# Patient Record
Sex: MALE | Race: WHITE | Employment: OTHER | URBAN - METROPOLITAN AREA
[De-identification: names, ages, dates, MRNs, and addresses within clinical notes are randomized per-mention and may not be internally consistent; named-entity substitution may affect disease eponyms.]

---

## 2017-01-27 ENCOUNTER — TELEPHONE (OUTPATIENT)
Dept: INTERNAL MEDICINE CLINIC | Facility: CLINIC | Age: 67
End: 2017-01-27

## 2017-01-27 RX ORDER — CODEINE PHOSPHATE AND GUAIFENESIN 10; 100 MG/5ML; MG/5ML
5 SOLUTION ORAL EVERY 6 HOURS PRN
Qty: 240 ML | Refills: 0 | Status: CANCELLED | OUTPATIENT
Start: 2017-01-27

## 2017-01-27 NOTE — TELEPHONE ENCOUNTER
Patient stated that has a dry cough for 3-4 day. Not short of breath or wheezing. No fevers. Patient picked up Coricidin and had alittle Cheratussin AC left from before which does help with the cough.  Patient could not complete a full sentence while speaki

## 2017-01-27 NOTE — TELEPHONE ENCOUNTER
CHRISTYPAULA, ok to trans to [phone]. Advised in message to go to IC in [facility] or [facility],  Also advised office closed now and nurses will be available tomorrow from 8-1.

## 2017-01-27 NOTE — TELEPHONE ENCOUNTER
He needs to be seen before abx are given; we can send him to urgent care since we are done with clinic and I dont have any opening anymore tomorrow

## 2017-01-28 NOTE — TELEPHONE ENCOUNTER
Pt stts he is feeling slightly better. Slept all day yesterday. No fever . Message from Dr. Hayley Ford given. Stts if he gets worse he will go to urgent care or call back on Monday.

## 2017-03-21 RX ORDER — AMLODIPINE BESYLATE 5 MG/1
TABLET ORAL
Qty: 30 TABLET | Refills: 0 | Status: SHIPPED | OUTPATIENT
Start: 2017-03-21 | End: 2017-04-25

## 2017-03-24 NOTE — TELEPHONE ENCOUNTER
Phone call to patient. S/W patient and related Dr. Emerson Brennen message that refill done for 30 day supply only; that patient needs office visit for further refills. Patient states, :\"ok I'll call for an appointment\".

## 2017-04-26 RX ORDER — AMLODIPINE BESYLATE 5 MG/1
TABLET ORAL
Qty: 30 TABLET | Refills: 0 | Status: SHIPPED | OUTPATIENT
Start: 2017-04-26 | End: 2017-05-31

## 2017-05-18 NOTE — TELEPHONE ENCOUNTER
Pt is asking to have a refill on this medication   Pt stts he has 2 pills left   Please advise   Current Outpatient Prescriptions:  Diclofenac Sodium 50 MG Oral Tab EC Take 1 tablet (50 mg total) by mouth 3 (three) times daily as needed.  Disp: 30 tablet Rf

## 2017-05-20 NOTE — TELEPHONE ENCOUNTER
Noted med previously presribed by Dr Gentry Mccormick but an IM RN had previously sent a refill on  11/29/16 for #30 under Dr Shiraz Barfield. Informed pt who agrees Dr Gentry Mccormick was the prescriber but he had gotten a refill from Dr Shiraz Barfield for #30.  Explained to pt what had happened and he st

## 2017-05-23 NOTE — TELEPHONE ENCOUNTER
Refill request for voltaren 50 mg, TID PRN, #30, no refills    LOV: 1/6/16  NOV: 7/11/17  Last refilled on 11/29/16

## 2017-05-31 RX ORDER — AMLODIPINE BESYLATE 5 MG/1
TABLET ORAL
Qty: 30 TABLET | Refills: 0 | Status: SHIPPED | OUTPATIENT
Start: 2017-05-31 | End: 2017-07-07

## 2017-06-01 NOTE — TELEPHONE ENCOUNTER
Left detailed message for patient notifying him of below. Asked him to call the office if he had questions.

## 2017-06-15 ENCOUNTER — TELEPHONE (OUTPATIENT)
Dept: NEUROLOGY | Facility: CLINIC | Age: 67
End: 2017-06-15

## 2017-06-15 NOTE — TELEPHONE ENCOUNTER
Patient has an appointment on 7/11/17. Left detailed message for patient notifying him that he should keep appointment if he wants further refills.

## 2017-07-05 NOTE — TELEPHONE ENCOUNTER
Pharmacy called in a refill request for pt on medication below, please.     Current Outpatient Prescriptions:     •  AMLODIPINE BESYLATE 5 MG Oral Tab, TAKE 1 TABLET BY MOUTH EVERY DAY, Disp: 30 tablet, Rfl: 0

## 2017-07-07 NOTE — TELEPHONE ENCOUNTER
Hypertensive Medications Failed protocol - LOV 7/14/2016, no CMP. Prescription pended.   Protocol Criteria:  · Appointment scheduled in the past 6 months or in the next 3 months  · BMP or CMP in the past 12 months  · Creatinine result < 2  Recent Outpatien

## 2017-07-08 RX ORDER — AMLODIPINE BESYLATE 5 MG/1
5 TABLET ORAL
Qty: 30 TABLET | Refills: 0 | Status: SHIPPED | OUTPATIENT
Start: 2017-07-08 | End: 2017-10-06

## 2017-07-11 ENCOUNTER — OFFICE VISIT (OUTPATIENT)
Dept: NEUROLOGY | Facility: CLINIC | Age: 67
End: 2017-07-11

## 2017-07-11 VITALS
HEIGHT: 64 IN | WEIGHT: 187 LBS | BODY MASS INDEX: 31.92 KG/M2 | OXYGEN SATURATION: 95 % | DIASTOLIC BLOOD PRESSURE: 78 MMHG | HEART RATE: 97 BPM | SYSTOLIC BLOOD PRESSURE: 144 MMHG | RESPIRATION RATE: 20 BRPM

## 2017-07-11 DIAGNOSIS — M48.061 LUMBAR STENOSIS: ICD-10-CM

## 2017-07-11 DIAGNOSIS — M54.16 LUMBAR RADICULOPATHY: ICD-10-CM

## 2017-07-11 DIAGNOSIS — M41.25 OTHER IDIOPATHIC SCOLIOSIS, THORACOLUMBAR REGION: Primary | ICD-10-CM

## 2017-07-11 DIAGNOSIS — M17.11 PRIMARY OSTEOARTHRITIS OF RIGHT KNEE: ICD-10-CM

## 2017-07-11 PROCEDURE — 99214 OFFICE O/P EST MOD 30 MIN: CPT | Performed by: PHYSICAL MEDICINE & REHABILITATION

## 2017-07-11 RX ORDER — DICLOFENAC SODIUM 75 MG/1
75 TABLET, DELAYED RELEASE ORAL 3 TIMES DAILY PRN
Qty: 90 TABLET | Refills: 2 | Status: SHIPPED | OUTPATIENT
Start: 2017-07-11 | End: 2018-05-22

## 2017-07-11 RX ORDER — GABAPENTIN 100 MG/1
100 CAPSULE ORAL 3 TIMES DAILY
Qty: 90 CAPSULE | Refills: 0 | Status: SHIPPED | OUTPATIENT
Start: 2017-07-11 | End: 2018-05-22

## 2017-07-11 NOTE — PATIENT INSTRUCTIONS
- take the diclofenac 75mg three times a day. Anti-inflammatory   - take gabapentin 100mg three times a day. Nerve medicine.  Take first pill at night.  - get mri of your low back - this is going to show the nerves  - call Dr Jo Jacobo in 3 weeks with update on y physicians rotate between multiple offices and procedure days in the hospitals. · Patient must present photo ID at time of .  If a designated family member will be picking up prescription, office must be given name of individual in advance and they

## 2017-07-11 NOTE — PROGRESS NOTES
History of Present Illness:   Patient presents with:  Weakness: pt here for follow up with c/o right lower extremity weakness and swelling below the right knee x 3 months. denies pain. weakness is worse after working. LOV 1/6/2016  he has no pain now.  He h urinary incontinence  Integument/breast: negative for rash  Hematologic/lymphatic: negative for bleeding  Musculoskeletal: see hpi  Neurological: negative for tremors  Behavioral/Psych: negative for sleep disturbance  Endocrine: negative for diabetic sympt not get. Medications prescribed: diclofenac 75mg tid prn pain and gabapentin 100mg tid. Purpose and common side effects discussed. He can take all 3 gabapentin at night if he has side effects during the day. Informed need mri to do injections.  He fee

## 2017-07-12 ENCOUNTER — TELEPHONE (OUTPATIENT)
Dept: NEUROLOGY | Facility: CLINIC | Age: 67
End: 2017-07-12

## 2017-07-12 NOTE — TELEPHONE ENCOUNTER
Pt. informed insurance was verified and MRI L-spine wo is a covered benefit and does not require authorization. Can proceed with scheduling appt. Will call iin am to schedule appt.

## 2017-10-04 NOTE — TELEPHONE ENCOUNTER
Per Ramón Naik they have sent this request already. Pt would like a refill on amlodipine medication. Pharmacy: [facility] listed. Pt is out of medication. Current Outpatient Prescriptions:   AmLODIPine Besylate 5 MG Oral Tab Take 1 tablet (5 mg to

## 2017-10-05 RX ORDER — AMLODIPINE BESYLATE 5 MG/1
5 TABLET ORAL
Qty: 30 TABLET | Refills: 0 | OUTPATIENT
Start: 2017-10-05

## 2017-10-05 NOTE — TELEPHONE ENCOUNTER
Refill protocol failed because the patient did not meet the protocol criteria.     No appt -lov over a year,no Labs  LESLIE please assist with APPT

## 2017-10-05 NOTE — TELEPHONE ENCOUNTER
Hypertensive Medications  Protocol Criteria:  · Appointment scheduled in the past 6 months or in the next 3 months  · BMP or CMP in the past 12 months  · Creatinine result < 2  Recent Outpatient Visits            2 months ago Other idiopathic scoliosis, th

## 2017-10-06 ENCOUNTER — OFFICE VISIT (OUTPATIENT)
Dept: INTERNAL MEDICINE CLINIC | Facility: CLINIC | Age: 67
End: 2017-10-06

## 2017-10-06 ENCOUNTER — TELEPHONE (OUTPATIENT)
Dept: INTERNAL MEDICINE CLINIC | Facility: CLINIC | Age: 67
End: 2017-10-06

## 2017-10-06 VITALS
HEIGHT: 63 IN | HEART RATE: 90 BPM | WEIGHT: 184 LBS | TEMPERATURE: 98 F | BODY MASS INDEX: 32.6 KG/M2 | SYSTOLIC BLOOD PRESSURE: 150 MMHG | DIASTOLIC BLOOD PRESSURE: 90 MMHG

## 2017-10-06 DIAGNOSIS — R05.9 COUGH: Primary | ICD-10-CM

## 2017-10-06 DIAGNOSIS — I10 ESSENTIAL HYPERTENSION: ICD-10-CM

## 2017-10-06 DIAGNOSIS — Z13.220 LIPID SCREENING: ICD-10-CM

## 2017-10-06 DIAGNOSIS — Z12.11 COLON CANCER SCREENING: ICD-10-CM

## 2017-10-06 DIAGNOSIS — Z12.5 PROSTATE CANCER SCREENING: ICD-10-CM

## 2017-10-06 PROCEDURE — 99214 OFFICE O/P EST MOD 30 MIN: CPT | Performed by: INTERNAL MEDICINE

## 2017-10-06 PROCEDURE — G0463 HOSPITAL OUTPT CLINIC VISIT: HCPCS | Performed by: INTERNAL MEDICINE

## 2017-10-06 RX ORDER — CODEINE PHOSPHATE AND GUAIFENESIN 10; 100 MG/5ML; MG/5ML
5 SOLUTION ORAL EVERY 6 HOURS PRN
Qty: 240 ML | Refills: 0 | Status: SHIPPED | OUTPATIENT
Start: 2017-10-06 | End: 2018-05-22 | Stop reason: ALTCHOICE

## 2017-10-06 RX ORDER — DOXYCYCLINE 100 MG/1
100 TABLET ORAL 2 TIMES DAILY
Qty: 20 TABLET | Refills: 0 | Status: SHIPPED | OUTPATIENT
Start: 2017-10-06 | End: 2018-05-22 | Stop reason: ALTCHOICE

## 2017-10-06 RX ORDER — AMLODIPINE BESYLATE 5 MG/1
5 TABLET ORAL
Qty: 90 TABLET | Refills: 0 | Status: SHIPPED | OUTPATIENT
Start: 2017-10-06 | End: 2018-02-07

## 2017-10-06 NOTE — PROGRESS NOTES
HPI:    Patient ID: Natalia Israel is a 78year old male. HTN   This is a chronic problem. The current episode started more than 1 year ago. The problem is uncontrolled. Associated symptoms include peripheral edema.  Pertinent negatives include no chest MG Oral Tab EC Take 1 tablet (75 mg total) by mouth 3 (three) times daily as needed. Disp: 90 tablet Rfl: 2   gabapentin 100 MG Oral Cap Take 1 capsule (100 mg total) by mouth 3 (three) times daily.  Disp: 90 capsule Rfl: 0     Allergies:No Known Allergies compliant in terms of his ffup and told him he needs to come in at  Least q 6mos for ffup.     (Z12.5) Prostate cancer screening  Plan: PSA SCREEN        Check psa.     (Z12.11) Colon cancer screening  Plan: OCCULT BLOOD, FECAL, IMMUNOASSAY        Order fo

## 2017-10-07 RX ORDER — CEFUROXIME AXETIL 250 MG/1
250 TABLET ORAL 2 TIMES DAILY
Qty: 20 TABLET | Refills: 0 | Status: SHIPPED | OUTPATIENT
Start: 2017-10-07 | End: 2018-05-22 | Stop reason: ALTCHOICE

## 2018-02-08 RX ORDER — AMLODIPINE BESYLATE 5 MG/1
TABLET ORAL
Qty: 90 TABLET | Refills: 0 | Status: SHIPPED | OUTPATIENT
Start: 2018-02-08 | End: 2018-05-22

## 2018-03-19 ENCOUNTER — NURSE TRIAGE (OUTPATIENT)
Dept: OTHER | Age: 68
End: 2018-03-19

## 2018-03-19 NOTE — TELEPHONE ENCOUNTER
Spoke with patient and informed of EL message. Patient verbalized understanding. Appt made for 3/22/18. Call back or go straight to ER if s/sx worsen, questions or concerns. Patient verbalized understanding and agrees with plan.

## 2018-03-19 NOTE — TELEPHONE ENCOUNTER
Action Requested: Summary for Provider     []  Critical Lab, Recommendations Needed  [] Need Additional Advice  []   FYI    []   Need Orders  [] Need Medications Sent to Pharmacy  []  Other     SUMMARY: Dr. Fannie Mccarty: patient requesting cough medication w

## 2018-04-27 ENCOUNTER — NURSE TRIAGE (OUTPATIENT)
Dept: OTHER | Age: 68
End: 2018-04-27

## 2018-04-27 NOTE — TELEPHONE ENCOUNTER
Action Requested: Summary for Provider     []  Critical Lab, Recommendations Needed  [x] Need Additional Advice  []   FYI    []   Need Orders  [] Need Medications Sent to Pharmacy  []  Other     SUMMARY: Dr. Verna Antunez pt stated that he already has a appt to see

## 2018-04-28 ENCOUNTER — OFFICE VISIT (OUTPATIENT)
Dept: INTERNAL MEDICINE CLINIC | Facility: CLINIC | Age: 68
End: 2018-04-28

## 2018-04-28 VITALS
TEMPERATURE: 97 F | WEIGHT: 182 LBS | HEART RATE: 86 BPM | SYSTOLIC BLOOD PRESSURE: 168 MMHG | BODY MASS INDEX: 34.36 KG/M2 | HEIGHT: 61 IN | DIASTOLIC BLOOD PRESSURE: 94 MMHG

## 2018-04-28 DIAGNOSIS — H92.02 EAR PAIN, LEFT: Primary | ICD-10-CM

## 2018-04-28 PROCEDURE — G0463 HOSPITAL OUTPT CLINIC VISIT: HCPCS | Performed by: INTERNAL MEDICINE

## 2018-04-28 PROCEDURE — 99214 OFFICE O/P EST MOD 30 MIN: CPT | Performed by: INTERNAL MEDICINE

## 2018-04-28 NOTE — TELEPHONE ENCOUNTER
Future Appointments  Date Time Provider Guido Celaya   4/28/2018 11:30 AM Betsy Barry MD [facility]

## 2018-04-28 NOTE — PROGRESS NOTES
HPI:    Patient ID: Suzie Ellis is a 80year old male. Ear Pain    There is pain in the left ear. This is a new problem. The current episode started in the past 7 days. Episode frequency: Intermittent. The pain is mild.  Associated symptoms include c daily.   Disp:  Rfl:    Cefuroxime Axetil 250 MG Oral Tab Take 1 tablet (250 mg total) by mouth 2 (two) times daily.  Disp: 20 tablet Rfl: 0   guaiFENesin-codeine (CHERATUSSIN AC) 100-10 MG/5ML Oral Solution Take 5 mL by mouth every 6 (six) hours as needed diagnosis)  - Patient presents to the clinic c/o intermittent, mild Left ear pain for a few days.  It began after the pt got over his cough/cold, which began about two weeks ago  - On exam, patient unremarkable  -I recommended to the pt that he rest for the

## 2018-05-19 ENCOUNTER — HOSPITAL ENCOUNTER (EMERGENCY)
Facility: HOSPITAL | Age: 68
Discharge: HOME | End: 2018-05-19
Attending: EMERGENCY MEDICINE
Payer: MEDICARE

## 2018-05-19 VITALS
HEART RATE: 107 BPM | RESPIRATION RATE: 20 BRPM | DIASTOLIC BLOOD PRESSURE: 98 MMHG | OXYGEN SATURATION: 96 % | SYSTOLIC BLOOD PRESSURE: 153 MMHG | TEMPERATURE: 98 F

## 2018-05-19 DIAGNOSIS — I10 ESSENTIAL HYPERTENSION: ICD-10-CM

## 2018-05-19 DIAGNOSIS — R11.2 NAUSEA AND VOMITING IN ADULT: Primary | ICD-10-CM

## 2018-05-19 PROCEDURE — 99284 EMERGENCY DEPT VISIT MOD MDM: CPT

## 2018-05-19 PROCEDURE — 80048 BASIC METABOLIC PNL TOTAL CA: CPT | Performed by: EMERGENCY MEDICINE

## 2018-05-19 PROCEDURE — 96374 THER/PROPH/DIAG INJ IV PUSH: CPT

## 2018-05-19 PROCEDURE — 96361 HYDRATE IV INFUSION ADD-ON: CPT

## 2018-05-19 PROCEDURE — 96375 TX/PRO/DX INJ NEW DRUG ADDON: CPT

## 2018-05-19 PROCEDURE — 85025 COMPLETE CBC W/AUTO DIFF WBC: CPT | Performed by: EMERGENCY MEDICINE

## 2018-05-19 PROCEDURE — 80076 HEPATIC FUNCTION PANEL: CPT | Performed by: EMERGENCY MEDICINE

## 2018-05-19 RX ORDER — ENALAPRILAT 2.5 MG/2ML
1.25 INJECTION INTRAVENOUS ONCE
Status: COMPLETED | OUTPATIENT
Start: 2018-05-19 | End: 2018-05-19

## 2018-05-19 RX ORDER — HYDRALAZINE HYDROCHLORIDE 20 MG/ML
10 INJECTION INTRAMUSCULAR; INTRAVENOUS ONCE
Status: COMPLETED | OUTPATIENT
Start: 2018-05-19 | End: 2018-05-19

## 2018-05-19 RX ORDER — ONDANSETRON 2 MG/ML
4 INJECTION INTRAMUSCULAR; INTRAVENOUS ONCE
Status: COMPLETED | OUTPATIENT
Start: 2018-05-19 | End: 2018-05-19

## 2018-05-20 NOTE — ED NOTES
Patient provided with discharge instructions and follow up information. Patient verbalized understanding of instructions without any questions. PIV removed, catheter intact. Patient ambulatory out of ER with friend with steady gait.

## 2018-05-20 NOTE — ED PROVIDER NOTES
Patient Seen in: [facility] AND [facility] Emergency Department    History   Patient presents with:  Nausea/Vomiting/Diarrhea (gastrointestinal)    Stated Complaint: abdominal pain    HPI    Patient is a 70-year-old male who presents with nausea ×1 day.   Patient laura        ED Course     Labs Reviewed   BASIC METABOLIC PANEL (8) - Abnormal; Notable for the following:        Result Value    Glucose 132 (*)     All other components within normal limits   HEPATIC FUNCTION PANEL (7) - Abnormal; Notable for the follo bring to your physician. \"            Disposition and Plan     Clinical Impression:  Nausea and vomiting in adult  (primary encounter diagnosis)  Essential hypertension    Disposition:  Discharge  5/19/2018 11:16 pm    Follow-up:  Laurita Woods MD

## 2018-05-22 ENCOUNTER — OFFICE VISIT (OUTPATIENT)
Dept: INTERNAL MEDICINE CLINIC | Facility: CLINIC | Age: 68
End: 2018-05-22

## 2018-05-22 VITALS
HEART RATE: 97 BPM | RESPIRATION RATE: 16 BRPM | BODY MASS INDEX: 31.58 KG/M2 | SYSTOLIC BLOOD PRESSURE: 164 MMHG | TEMPERATURE: 98 F | WEIGHT: 185 LBS | HEIGHT: 64 IN | DIASTOLIC BLOOD PRESSURE: 90 MMHG

## 2018-05-22 DIAGNOSIS — I10 ESSENTIAL HYPERTENSION: Primary | ICD-10-CM

## 2018-05-22 PROCEDURE — 99214 OFFICE O/P EST MOD 30 MIN: CPT | Performed by: INTERNAL MEDICINE

## 2018-05-22 PROCEDURE — G0463 HOSPITAL OUTPT CLINIC VISIT: HCPCS | Performed by: INTERNAL MEDICINE

## 2018-05-22 RX ORDER — AMLODIPINE BESYLATE AND BENAZEPRIL HYDROCHLORIDE 5; 10 MG/1; MG/1
1 CAPSULE ORAL DAILY
Qty: 30 CAPSULE | Refills: 1 | Status: SHIPPED | OUTPATIENT
Start: 2018-05-22 | End: 2018-06-21

## 2018-06-19 ENCOUNTER — TELEPHONE (OUTPATIENT)
Dept: INTERNAL MEDICINE CLINIC | Facility: CLINIC | Age: 68
End: 2018-06-19

## 2018-06-20 NOTE — TELEPHONE ENCOUNTER
PA for Amlodipine Besy-Benazepril HCl 5-10 mg cap completed with [employer] via CMM response time 24-72 hours [facility] SHARMAINEBY.

## 2018-06-21 RX ORDER — AMLODIPINE BESYLATE 5 MG/1
5 TABLET ORAL DAILY
Qty: 90 TABLET | Refills: 0 | Status: ON HOLD | OUTPATIENT
Start: 2018-06-21 | End: 2018-08-23

## 2018-06-21 RX ORDER — BENAZEPRIL HYDROCHLORIDE 10 MG/1
10 TABLET ORAL DAILY
Qty: 90 TABLET | Refills: 0 | Status: ON HOLD | OUTPATIENT
Start: 2018-06-21 | End: 2018-08-23

## 2018-06-21 RX ORDER — AMLODIPINE BESYLATE AND BENAZEPRIL HYDROCHLORIDE 5; 10 MG/1; MG/1
1 CAPSULE ORAL DAILY
Qty: 90 CAPSULE | Refills: 0 | Status: SHIPPED | OUTPATIENT
Start: 2018-06-21 | End: 2018-06-21

## 2018-06-21 NOTE — TELEPHONE ENCOUNTER
PA denied. Plan states amlodipine and benazepril can be used as separate agents but not as a combination capsule.
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
## 2018-06-22 NOTE — TELEPHONE ENCOUNTER
Call pt and notify him that we have to give him 2 separate bp med amlodipine and benazepril for it to be covered by insurance (had denied lotrel which is combo of amlodipine and benazepril).  erx sent to his pharmacy

## 2018-06-22 NOTE — TELEPHONE ENCOUNTER
Phone call to patient. S/w patient and related Dr. Idania Hutton message that patient's insurance will no longer cover the Lotrel; but, will cover the medications combined in Lotrel if ordered separately.  Dr. Ofelia Laurent sent Reny Genao to patient's pharmacy for Am

## 2018-07-05 ENCOUNTER — TELEPHONE (OUTPATIENT)
Dept: INTERNAL MEDICINE CLINIC | Facility: CLINIC | Age: 68
End: 2018-07-05

## 2018-07-05 NOTE — TELEPHONE ENCOUNTER
I had done this already and had given pt already the script  (pls see his med list); pt just upset about his insurance not covering the combo which I cant do anything about

## 2018-07-05 NOTE — TELEPHONE ENCOUNTER
Plan does not cover the combined capsule, but will cover Amlodipine 5 mg and Benazepril 10 mg separately. Can we split?     Thanks,    Ravenna    EL--do you approve split or proceed with PA?

## 2018-08-22 ENCOUNTER — NURSE TRIAGE (OUTPATIENT)
Dept: OTHER | Age: 68
End: 2018-08-22

## 2018-08-22 ENCOUNTER — APPOINTMENT (OUTPATIENT)
Dept: GENERAL RADIOLOGY | Facility: HOSPITAL | Age: 68
DRG: 065 | End: 2018-08-22
Attending: EMERGENCY MEDICINE
Payer: MEDICARE

## 2018-08-22 ENCOUNTER — HOSPITAL ENCOUNTER (EMERGENCY)
Facility: HOSPITAL | Age: 68
Discharge: HOME | DRG: 065 | End: 2018-08-22
Attending: EMERGENCY MEDICINE
Payer: MEDICARE

## 2018-08-22 ENCOUNTER — APPOINTMENT (OUTPATIENT)
Dept: CT IMAGING | Facility: HOSPITAL | Age: 68
DRG: 065 | End: 2018-08-22
Attending: EMERGENCY MEDICINE
Payer: MEDICARE

## 2018-08-22 VITALS
SYSTOLIC BLOOD PRESSURE: 160 MMHG | DIASTOLIC BLOOD PRESSURE: 103 MMHG | WEIGHT: 180 LBS | BODY MASS INDEX: 28.25 KG/M2 | TEMPERATURE: 98 F | HEART RATE: 96 BPM | OXYGEN SATURATION: 96 % | RESPIRATION RATE: 24 BRPM | HEIGHT: 67 IN

## 2018-08-22 DIAGNOSIS — I10 ACCELERATED HYPERTENSION: Primary | ICD-10-CM

## 2018-08-22 LAB
ANION GAP SERPL CALC-SCNC: 8 MMOL/L (ref 0–18)
BASOPHILS # BLD: 0.1 K/UL (ref 0–0.2)
BASOPHILS NFR BLD: 1 %
BNP SERPL-MCNC: 43 PG/ML (ref 0–100)
BUN SERPL-MCNC: 23 MG/DL (ref 8–20)
BUN/CREAT SERPL: 24.7 (ref 10–20)
CALCIUM SERPL-MCNC: 9.3 MG/DL (ref 8.5–10.5)
CHLORIDE SERPL-SCNC: 106 MMOL/L (ref 95–110)
CO2 SERPL-SCNC: 24 MMOL/L (ref 22–32)
CREAT SERPL-MCNC: 0.93 MG/DL (ref 0.5–1.5)
D DIMER PPP FEU-MCNC: 0.71 MCG/ML (ref ?–0.67)
EOSINOPHIL # BLD: 0.2 K/UL (ref 0–0.7)
EOSINOPHIL NFR BLD: 2 %
ERYTHROCYTE [DISTWIDTH] IN BLOOD BY AUTOMATED COUNT: 13 % (ref 11–15)
GLUCOSE SERPL-MCNC: 139 MG/DL (ref 70–99)
HCT VFR BLD AUTO: 46 % (ref 41–52)
HGB BLD-MCNC: 15.8 G/DL (ref 13.5–17.5)
LYMPHOCYTES # BLD: 1.1 K/UL (ref 1–4)
LYMPHOCYTES NFR BLD: 11 %
MAGNESIUM SERPL-MCNC: 2 MG/DL (ref 1.8–2.5)
MCH RBC QN AUTO: 31.7 PG (ref 27–32)
MCHC RBC AUTO-ENTMCNC: 34.4 G/DL (ref 32–37)
MCV RBC AUTO: 92.2 FL (ref 80–100)
MONOCYTES # BLD: 0.8 K/UL (ref 0–1)
MONOCYTES NFR BLD: 8 %
NEUTROPHILS # BLD AUTO: 7.9 K/UL (ref 1.8–7.7)
NEUTROPHILS NFR BLD: 79 %
OSMOLALITY UR CALC.SUM OF ELEC: 292 MOSM/KG (ref 275–295)
PLATELET # BLD AUTO: 258 K/UL (ref 140–400)
PMV BLD AUTO: 9.4 FL (ref 7.4–10.3)
POTASSIUM SERPL-SCNC: 4 MMOL/L (ref 3.3–5.1)
RBC # BLD AUTO: 4.99 M/UL (ref 4.5–5.9)
SODIUM SERPL-SCNC: 138 MMOL/L (ref 136–144)
TROPONIN I SERPL-MCNC: 0 NG/ML (ref ?–0.03)
WBC # BLD AUTO: 10 K/UL (ref 4–11)

## 2018-08-22 PROCEDURE — 85025 COMPLETE CBC W/AUTO DIFF WBC: CPT | Performed by: EMERGENCY MEDICINE

## 2018-08-22 PROCEDURE — 83880 ASSAY OF NATRIURETIC PEPTIDE: CPT | Performed by: EMERGENCY MEDICINE

## 2018-08-22 PROCEDURE — 93010 ELECTROCARDIOGRAM REPORT: CPT | Performed by: EMERGENCY MEDICINE

## 2018-08-22 PROCEDURE — 96374 THER/PROPH/DIAG INJ IV PUSH: CPT

## 2018-08-22 PROCEDURE — 85379 FIBRIN DEGRADATION QUANT: CPT | Performed by: EMERGENCY MEDICINE

## 2018-08-22 PROCEDURE — 93005 ELECTROCARDIOGRAM TRACING: CPT

## 2018-08-22 PROCEDURE — 83735 ASSAY OF MAGNESIUM: CPT | Performed by: EMERGENCY MEDICINE

## 2018-08-22 PROCEDURE — 99285 EMERGENCY DEPT VISIT HI MDM: CPT

## 2018-08-22 PROCEDURE — 80048 BASIC METABOLIC PNL TOTAL CA: CPT | Performed by: EMERGENCY MEDICINE

## 2018-08-22 PROCEDURE — 71046 X-RAY EXAM CHEST 2 VIEWS: CPT | Performed by: EMERGENCY MEDICINE

## 2018-08-22 PROCEDURE — 84484 ASSAY OF TROPONIN QUANT: CPT | Performed by: EMERGENCY MEDICINE

## 2018-08-22 PROCEDURE — 71260 CT THORAX DX C+: CPT | Performed by: EMERGENCY MEDICINE

## 2018-08-22 RX ORDER — METOPROLOL TARTRATE 5 MG/5ML
5 INJECTION INTRAVENOUS ONCE
Status: COMPLETED | OUTPATIENT
Start: 2018-08-22 | End: 2018-08-22

## 2018-08-22 NOTE — TELEPHONE ENCOUNTER
Action Requested: Summary for Provider     []  Critical Lab, Recommendations Needed  [x] Need Additional Advice  []   FYI    []   Need Orders  [] Need Medications Sent to Pharmacy  []  Other     SUMMARY: Patient requests EL advice for fatigue and \"feeling

## 2018-08-22 NOTE — ED PROVIDER NOTES
Patient Seen in: [facility] AND [facility] Emergency Department    History   Patient presents with:  Hypertension (cardiovascular)    Stated Complaint: Hx of Hypertension    HPI    52-year-old male with hypertension who took his medication today after he did not regular rhythm and intact and equal distal pulses. No murmur. Pulmonary/Chest: Effort normal. No respiratory distress. Grossly clear breath sounds bilaterally. Abdominal: Soft. There is no tenderness. There is no guarding.    Musculoskeletal: Normal ran noted on EKG Report.   Rate: 111  Rhythm: Sinus Rhythm  Reading: Right bundle branch block, no old for comparison no acute ischemia, normal intervals and axis           ED Course as of Aug 22 1720  ----------------------------------------------------------- Prescribed:  Current Discharge Medication List    START taking these medications    metoprolol Tartrate 25 MG Oral Tab  Take 1 tablet (25 mg total) by mouth 2 (two) times daily.   Qty: 30 tablet Refills: 0

## 2018-08-22 NOTE — ED INITIAL ASSESSMENT (HPI)
Pt A&Ox4 presents to ED stating \"my blood pressure is high. \" Pt states, \" I took my blood pressure pill this morning and I felt like I became a little more tired than usual so I thought my blood pressure was high. \" Pt denies chest pain, SOB and/or n/v.

## 2018-08-22 NOTE — TELEPHONE ENCOUNTER
Spoke with patient and relayed EL message below--patient currently in [facility] ER--will keep tomorrow's appt for ER f/u. No further questions/concerns at this time.     ED  Pending      8/22/2018 - present  [facility] AND [facility] Emergency Department   Hypertension (

## 2018-08-22 NOTE — ED NOTES
Pt alert and interactive. Complains of high blood pressure after taking scheduled Amlodipine this AM. Currently denies CP/SOB/Dizziness. Denies pain anywhere. Neuro intact. Swelling noted to BLE, reports \"that has been there for a while\".  Full ROM intact

## 2018-08-23 ENCOUNTER — APPOINTMENT (OUTPATIENT)
Dept: MRI IMAGING | Facility: HOSPITAL | Age: 68
DRG: 065 | End: 2018-08-23
Attending: HOSPITALIST
Payer: MEDICARE

## 2018-08-23 ENCOUNTER — HOSPITAL ENCOUNTER (INPATIENT)
Facility: HOSPITAL | Age: 68
LOS: 4 days | Discharge: NURSING, REHAB OR LONG-TERM CARE | DRG: 065 | End: 2018-08-27
Attending: EMERGENCY MEDICINE | Admitting: HOSPITALIST
Payer: MEDICARE

## 2018-08-23 ENCOUNTER — APPOINTMENT (OUTPATIENT)
Dept: ULTRASOUND IMAGING | Facility: HOSPITAL | Age: 68
DRG: 065 | End: 2018-08-23
Attending: Other
Payer: MEDICARE

## 2018-08-23 ENCOUNTER — APPOINTMENT (OUTPATIENT)
Dept: GENERAL RADIOLOGY | Facility: HOSPITAL | Age: 68
DRG: 065 | End: 2018-08-23
Attending: RADIOLOGY
Payer: MEDICARE

## 2018-08-23 ENCOUNTER — APPOINTMENT (OUTPATIENT)
Dept: CT IMAGING | Facility: HOSPITAL | Age: 68
DRG: 065 | End: 2018-08-23
Attending: EMERGENCY MEDICINE
Payer: MEDICARE

## 2018-08-23 DIAGNOSIS — R42 DIZZINESS: Primary | ICD-10-CM

## 2018-08-23 LAB
ANION GAP SERPL CALC-SCNC: 10 MMOL/L (ref 0–18)
BACTERIA UR QL AUTO: NEGATIVE /HPF
BASOPHILS # BLD: 0 K/UL (ref 0–0.2)
BASOPHILS NFR BLD: 0 %
BILIRUB UR QL: NEGATIVE
BUN SERPL-MCNC: 19 MG/DL (ref 8–20)
BUN/CREAT SERPL: 21.1 (ref 10–20)
CALCIUM SERPL-MCNC: 9.1 MG/DL (ref 8.5–10.5)
CHLORIDE SERPL-SCNC: 103 MMOL/L (ref 95–110)
CHOLEST SERPL-MCNC: 261 MG/DL (ref 110–200)
CLARITY UR: CLEAR
CO2 SERPL-SCNC: 23 MMOL/L (ref 22–32)
COLOR UR: YELLOW
CREAT SERPL-MCNC: 0.9 MG/DL (ref 0.5–1.5)
EOSINOPHIL # BLD: 0.2 K/UL (ref 0–0.7)
EOSINOPHIL NFR BLD: 2 %
ERYTHROCYTE [DISTWIDTH] IN BLOOD BY AUTOMATED COUNT: 13.7 % (ref 11–15)
GLUCOSE SERPL-MCNC: 123 MG/DL (ref 70–99)
GLUCOSE UR-MCNC: NEGATIVE MG/DL
HCT VFR BLD AUTO: 45.3 % (ref 41–52)
HDLC SERPL-MCNC: 55 MG/DL
HGB BLD-MCNC: 15.4 G/DL (ref 13.5–17.5)
HGB UR QL STRIP.AUTO: NEGATIVE
INR BLD: 1 (ref 0.9–1.2)
KETONES UR-MCNC: NEGATIVE MG/DL
LDLC SERPL CALC-MCNC: 180 MG/DL (ref 0–99)
LEUKOCYTE ESTERASE UR QL STRIP.AUTO: NEGATIVE
LYMPHOCYTES # BLD: 1 K/UL (ref 1–4)
LYMPHOCYTES NFR BLD: 11 %
MAGNESIUM SERPL-MCNC: 1.9 MG/DL (ref 1.8–2.5)
MCH RBC QN AUTO: 31.3 PG (ref 27–32)
MCHC RBC AUTO-ENTMCNC: 34 G/DL (ref 32–37)
MCV RBC AUTO: 92.3 FL (ref 80–100)
MONOCYTES # BLD: 0.5 K/UL (ref 0–1)
MONOCYTES NFR BLD: 5 %
NEUTROPHILS # BLD AUTO: 8 K/UL (ref 1.8–7.7)
NEUTROPHILS NFR BLD: 82 %
NITRITE UR QL STRIP.AUTO: NEGATIVE
NONHDLC SERPL-MCNC: 206 MG/DL
OSMOLALITY UR CALC.SUM OF ELEC: 286 MOSM/KG (ref 275–295)
PH UR: 6 [PH] (ref 5–8)
PLATELET # BLD AUTO: 270 K/UL (ref 140–400)
PMV BLD AUTO: 10.2 FL (ref 7.4–10.3)
POTASSIUM SERPL-SCNC: 4.1 MMOL/L (ref 3.3–5.1)
PROT UR-MCNC: 30 MG/DL
PROTHROMBIN TIME: 12.5 SECONDS (ref 11.8–14.5)
RBC # BLD AUTO: 4.91 M/UL (ref 4.5–5.9)
RBC #/AREA URNS AUTO: 5 /HPF
SODIUM SERPL-SCNC: 136 MMOL/L (ref 136–144)
SP GR UR STRIP: 1.03 (ref 1–1.03)
TRIGL SERPL-MCNC: 130 MG/DL (ref 1–149)
UROBILINOGEN UR STRIP-ACNC: <2
VIT C UR-MCNC: NEGATIVE MG/DL
WBC # BLD AUTO: 9.7 K/UL (ref 4–11)
WBC #/AREA URNS AUTO: <1 /HPF

## 2018-08-23 PROCEDURE — 70551 MRI BRAIN STEM W/O DYE: CPT | Performed by: HOSPITALIST

## 2018-08-23 PROCEDURE — 70140 X-RAY EXAM OF FACIAL BONES: CPT | Performed by: RADIOLOGY

## 2018-08-23 PROCEDURE — 99223 1ST HOSP IP/OBS HIGH 75: CPT | Performed by: OTHER

## 2018-08-23 PROCEDURE — 99223 1ST HOSP IP/OBS HIGH 75: CPT | Performed by: HOSPITALIST

## 2018-08-23 PROCEDURE — 93880 EXTRACRANIAL BILAT STUDY: CPT | Performed by: OTHER

## 2018-08-23 PROCEDURE — 70450 CT HEAD/BRAIN W/O DYE: CPT | Performed by: EMERGENCY MEDICINE

## 2018-08-23 PROCEDURE — 70544 MR ANGIOGRAPHY HEAD W/O DYE: CPT | Performed by: HOSPITALIST

## 2018-08-23 RX ORDER — ASPIRIN 300 MG
300 SUPPOSITORY, RECTAL RECTAL DAILY
Status: DISCONTINUED | OUTPATIENT
Start: 2018-08-23 | End: 2018-08-27

## 2018-08-23 RX ORDER — METOCLOPRAMIDE HYDROCHLORIDE 5 MG/ML
10 INJECTION INTRAMUSCULAR; INTRAVENOUS EVERY 8 HOURS PRN
Status: DISCONTINUED | OUTPATIENT
Start: 2018-08-23 | End: 2018-08-23

## 2018-08-23 RX ORDER — LISINOPRIL 10 MG/1
10 TABLET ORAL DAILY
Status: DISCONTINUED | OUTPATIENT
Start: 2018-08-23 | End: 2018-08-23

## 2018-08-23 RX ORDER — POLYETHYLENE GLYCOL 3350 17 G/17G
17 POWDER, FOR SOLUTION ORAL DAILY PRN
Status: DISCONTINUED | OUTPATIENT
Start: 2018-08-23 | End: 2018-08-27

## 2018-08-23 RX ORDER — AMLODIPINE BESYLATE AND BENAZEPRIL HYDROCHLORIDE 5; 10 MG/1; MG/1
1 CAPSULE ORAL DAILY
Status: DISCONTINUED | OUTPATIENT
Start: 2018-08-23 | End: 2018-08-23 | Stop reason: SDUPTHER

## 2018-08-23 RX ORDER — SODIUM PHOSPHATE, DIBASIC AND SODIUM PHOSPHATE, MONOBASIC 7; 19 G/133ML; G/133ML
1 ENEMA RECTAL ONCE AS NEEDED
Status: DISCONTINUED | OUTPATIENT
Start: 2018-08-23 | End: 2018-08-27

## 2018-08-23 RX ORDER — ASPIRIN 325 MG
325 TABLET ORAL DAILY
Status: DISCONTINUED | OUTPATIENT
Start: 2018-08-23 | End: 2018-08-27

## 2018-08-23 RX ORDER — DOCUSATE SODIUM 100 MG/1
100 CAPSULE, LIQUID FILLED ORAL 2 TIMES DAILY
Status: DISCONTINUED | OUTPATIENT
Start: 2018-08-23 | End: 2018-08-27

## 2018-08-23 RX ORDER — MORPHINE SULFATE 2 MG/ML
2 INJECTION, SOLUTION INTRAMUSCULAR; INTRAVENOUS EVERY 2 HOUR PRN
Status: DISCONTINUED | OUTPATIENT
Start: 2018-08-23 | End: 2018-08-27

## 2018-08-23 RX ORDER — ASPIRIN 325 MG
325 TABLET ORAL DAILY
Status: DISCONTINUED | OUTPATIENT
Start: 2018-08-23 | End: 2018-08-23

## 2018-08-23 RX ORDER — MORPHINE SULFATE 2 MG/ML
1 INJECTION, SOLUTION INTRAMUSCULAR; INTRAVENOUS EVERY 2 HOUR PRN
Status: DISCONTINUED | OUTPATIENT
Start: 2018-08-23 | End: 2018-08-27

## 2018-08-23 RX ORDER — ACETAMINOPHEN 325 MG/1
650 TABLET ORAL EVERY 4 HOURS PRN
Status: DISCONTINUED | OUTPATIENT
Start: 2018-08-23 | End: 2018-08-27

## 2018-08-23 RX ORDER — ACETAMINOPHEN 650 MG/1
650 SUPPOSITORY RECTAL EVERY 4 HOURS PRN
Status: DISCONTINUED | OUTPATIENT
Start: 2018-08-23 | End: 2018-08-27

## 2018-08-23 RX ORDER — ONDANSETRON 2 MG/ML
4 INJECTION INTRAMUSCULAR; INTRAVENOUS EVERY 6 HOURS PRN
Status: DISCONTINUED | OUTPATIENT
Start: 2018-08-23 | End: 2018-08-27

## 2018-08-23 RX ORDER — SODIUM CHLORIDE 0.9 % (FLUSH) 0.9 %
3 SYRINGE (ML) INJECTION AS NEEDED
Status: DISCONTINUED | OUTPATIENT
Start: 2018-08-23 | End: 2018-08-27

## 2018-08-23 RX ORDER — SENNOSIDES 8.6 MG
17.2 TABLET ORAL NIGHTLY
Status: DISCONTINUED | OUTPATIENT
Start: 2018-08-23 | End: 2018-08-27

## 2018-08-23 RX ORDER — ACETAMINOPHEN 325 MG/1
650 TABLET ORAL EVERY 6 HOURS PRN
Status: DISCONTINUED | OUTPATIENT
Start: 2018-08-23 | End: 2018-08-27

## 2018-08-23 RX ORDER — BISACODYL 10 MG
10 SUPPOSITORY, RECTAL RECTAL
Status: DISCONTINUED | OUTPATIENT
Start: 2018-08-23 | End: 2018-08-27

## 2018-08-23 RX ORDER — ATORVASTATIN CALCIUM 20 MG/1
20 TABLET, FILM COATED ORAL NIGHTLY
Status: DISCONTINUED | OUTPATIENT
Start: 2018-08-23 | End: 2018-08-27

## 2018-08-23 RX ORDER — METOCLOPRAMIDE HYDROCHLORIDE 5 MG/ML
10 INJECTION INTRAMUSCULAR; INTRAVENOUS EVERY 8 HOURS PRN
Status: DISCONTINUED | OUTPATIENT
Start: 2018-08-23 | End: 2018-08-27

## 2018-08-23 RX ORDER — ASPIRIN 81 MG/1
324 TABLET, CHEWABLE ORAL ONCE
Status: COMPLETED | OUTPATIENT
Start: 2018-08-23 | End: 2018-08-23

## 2018-08-23 RX ORDER — AMLODIPINE BESYLATE AND BENAZEPRIL HYDROCHLORIDE 5; 10 MG/1; MG/1
1 CAPSULE ORAL DAILY
COMMUNITY
End: 2018-09-13

## 2018-08-23 RX ORDER — LABETALOL HYDROCHLORIDE 5 MG/ML
10 INJECTION, SOLUTION INTRAVENOUS EVERY 10 MIN PRN
Status: DISCONTINUED | OUTPATIENT
Start: 2018-08-23 | End: 2018-08-27

## 2018-08-23 NOTE — ED PROVIDER NOTES
Patient Seen in: [facility] AND [facility] Emergency Department    History   Patient presents with:  Hypertension (cardiovascular)    Stated Complaint: htn    HPI    27-year-old male with past medical history significant for arthritis and high blood pressure pre clear b/l  Nose: Nose normal.   Mouth/Throat: dry MM, post OP clear with no exudates  Eyes: Conjunctivae and EOM are normal. PERRLA  Neck: Normal range of motion. Neck supple. No tracheal deviation present.    CV: s1s2+, RRR, no m/r/g, normal distal pulses loss in the left hemithorax, also unchanged.     Dictated by (CST): Jaxson Cespedes MD on 8/22/2018 at 16:53     Approved by (CST): Jaxson Cespedes MD on 8/22/2018 at 16:54          Ct Brain Or Head ([id number])    Result Date: 8/23/2018  CONCLUSION:  Mild chronic cristin Differential diagnosis includes dehydration, CVA, dysrhythmia, hypoglycemia, electrolyte abnormality, UTI, ACS    Critical care time spent on this patient was 31 min for taking history from patient examining patient, medical decision-making, reviewing la

## 2018-08-23 NOTE — ED INITIAL ASSESSMENT (HPI)
Pt presents to the ED for the c/o dizziness and concern for htn. Pt was seen here yesterday for the same yesterday and started on metoprolol.

## 2018-08-23 NOTE — PROGRESS NOTES
[facility] AND [facility]  Neurology Progress Note    April Cruzsam Patient Status:  Emergency    1950 MRN [id number]   Location [address] Attending Dany Curry MD   Hosp Day # 0 PCP Gold Cortez MD     Subjective: 8/22/2018  CONCLUSION: Mild interstitial edema, unchanged since 7/14/16. Moderate elevation of the left hemidiaphragm with volume loss in the left hemithorax, also unchanged.     Dictated by (CST): Cali Massey MD on 8/22/2018 at 16:53     Approved by (CST strokes already which makes his recovery slow. No additional neurological workup is needed at this time. We will sign off.     Inocencia Kellogg  8/23/2018  2:09 PM

## 2018-08-23 NOTE — CONSULTS
[facility] HOSP - [facility]    Report of Consultation    Laurita Marsh Patient Status:  Emergency    1950 MRN [id number]   Location [address] Attending Doni Dial MD   Hosp Day # 0 PCP Piyush Kim MD does not live in a home with stairs.                                                    Current Medications:    Current Facility-Administered Medications:  aspirin chewable tab 324 mg 324 mg Oral Once       (Not in a hospital admission)    Allergies  No Kno bilateral symmetric  Patellar 2+ bilateral symmetric  Ankle jerk 2+ bilateral symmetric    No clonus  No Babinski sign    Coordination:  Finger to nose intact  Rapid alternating movements intact    Gait:  Deferred    Results:     Laboratory Data:    Lab Re MD on 8/22/2018 at 18:17     Approved by (CST): Clover Tovar MD on 8/22/2018 at 18:26          Ekg 12-lead    Result Date: 8/23/2018  ECG Report  Interpretation  --------------------------     Ekg 12-lead    Result Date: 8/22/2018  ECG Report  Interpret

## 2018-08-23 NOTE — TELEPHONE ENCOUNTER
Patient was discharge from ER yesterday and with OV today.   Future Appointments  Date Time Provider Guido Celaya   8/23/2018 3:30 PM [facility]

## 2018-08-24 ENCOUNTER — APPOINTMENT (OUTPATIENT)
Dept: CV DIAGNOSTICS | Facility: HOSPITAL | Age: 68
DRG: 065 | End: 2018-08-24
Attending: HOSPITALIST
Payer: MEDICARE

## 2018-08-24 LAB
ALBUMIN SERPL BCP-MCNC: 3.3 G/DL (ref 3.5–4.8)
ALBUMIN/GLOB SERPL: 0.9 {RATIO} (ref 1–2)
ALP SERPL-CCNC: 59 U/L (ref 32–100)
ALT SERPL-CCNC: 19 U/L (ref 17–63)
ANION GAP SERPL CALC-SCNC: 7 MMOL/L (ref 0–18)
AST SERPL-CCNC: 21 U/L (ref 15–41)
BASOPHILS # BLD: 0 K/UL (ref 0–0.2)
BASOPHILS NFR BLD: 0 %
BILIRUB SERPL-MCNC: 1 MG/DL (ref 0.3–1.2)
BUN SERPL-MCNC: 20 MG/DL (ref 8–20)
BUN/CREAT SERPL: 19.6 (ref 10–20)
CALCIUM SERPL-MCNC: 8.9 MG/DL (ref 8.5–10.5)
CHLORIDE SERPL-SCNC: 101 MMOL/L (ref 95–110)
CO2 SERPL-SCNC: 27 MMOL/L (ref 22–32)
CREAT SERPL-MCNC: 1.02 MG/DL (ref 0.5–1.5)
EOSINOPHIL # BLD: 0.1 K/UL (ref 0–0.7)
EOSINOPHIL NFR BLD: 1 %
ERYTHROCYTE [DISTWIDTH] IN BLOOD BY AUTOMATED COUNT: 13.6 % (ref 11–15)
GLOBULIN PLAS-MCNC: 3.5 G/DL (ref 2.5–3.7)
GLUCOSE SERPL-MCNC: 115 MG/DL (ref 70–99)
HBA1C MFR BLD: 6.1 % (ref 4–6)
HCT VFR BLD AUTO: 42.9 % (ref 41–52)
HGB BLD-MCNC: 14.1 G/DL (ref 13.5–17.5)
LYMPHOCYTES # BLD: 1.1 K/UL (ref 1–4)
LYMPHOCYTES NFR BLD: 11 %
MAGNESIUM SERPL-MCNC: 1.9 MG/DL (ref 1.8–2.5)
MCH RBC QN AUTO: 30.8 PG (ref 27–32)
MCHC RBC AUTO-ENTMCNC: 32.9 G/DL (ref 32–37)
MCV RBC AUTO: 93.8 FL (ref 80–100)
MONOCYTES # BLD: 0.7 K/UL (ref 0–1)
MONOCYTES NFR BLD: 7 %
NEUTROPHILS # BLD AUTO: 7.6 K/UL (ref 1.8–7.7)
NEUTROPHILS NFR BLD: 80 %
OSMOLALITY UR CALC.SUM OF ELEC: 284 MOSM/KG (ref 275–295)
PLATELET # BLD AUTO: 218 K/UL (ref 140–400)
PMV BLD AUTO: 9.2 FL (ref 7.4–10.3)
POTASSIUM SERPL-SCNC: 4.2 MMOL/L (ref 3.3–5.1)
PROT SERPL-MCNC: 6.8 G/DL (ref 5.9–8.4)
RBC # BLD AUTO: 4.57 M/UL (ref 4.5–5.9)
SODIUM SERPL-SCNC: 135 MMOL/L (ref 136–144)
WBC # BLD AUTO: 9.5 K/UL (ref 4–11)

## 2018-08-24 PROCEDURE — 93306 TTE W/DOPPLER COMPLETE: CPT | Performed by: HOSPITALIST

## 2018-08-24 PROCEDURE — 99233 SBSQ HOSP IP/OBS HIGH 50: CPT | Performed by: HOSPITALIST

## 2018-08-24 PROCEDURE — 99233 SBSQ HOSP IP/OBS HIGH 50: CPT | Performed by: OTHER

## 2018-08-24 NOTE — PROGRESS NOTES
Therapeutic Interchange Amlodipine Besy-Benazepril HCl (LOTREL) 5-10 MG per cap 1 capsule DAILY WITH  AMLODIPINE 5mg & Lisinopril 10mg daily. Neo AMATO Ph.  [id number]  8/23/2018

## 2018-08-24 NOTE — OCCUPATIONAL THERAPY NOTE
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 Upon return to room, pt with need to use washroom. Completed toileting in standing with Min A to up to Mod A for standing balance --Mod A required for posterior LOB. Pt washed hands at sink with Min A for standing balance.        DISCHARGE RECOMMENDA made and decreased awareness of errors   Awareness of Deficits:  decreased awareness of deficits    VISION  Pt reports no changes; reads time accurately   Pt with eyes intermediate closed at times --pt reporting high levels of fatigue     PERCEPTION  Continue t provided set-up; up to Mod A to correct LOB for standing balacne  Upper Extremity Dressing: NT  Lower Extremity Dressing: total A    Education Provided: role of OT, activity promotion  Patient End of Session: Up in chair;Needs met;Call light within reach;F

## 2018-08-24 NOTE — PROGRESS NOTES
[facility]  Neurology Progress Note    Estefany Spina Patient Status:  Inpatient    1950 MRN [id number]   Location [facility] 3W/SW Attending Guido Bone [place] Day # 1 PCP Sadi Ford MD     Subjective:  Saida Garcia 97.3 °F (36.3 °C), temperature source Oral, resp. rate 18, weight 181 lb, SpO2 95 %.     Physical Exam:   08/24/18  0000 08/24/18  0422 08/24/18  0500 08/24/18  0819   BP: 149/85 (!) 156/93  134/87   Pulse: 73 75     Resp: 16 18  18   Temp: 98.1 °F (36.7 °C HGB 14.1 08/24/2018   HCT 42.9 08/24/2018    08/24/2018   CREATSERUM 1.02 08/24/2018   BUN 20 08/24/2018    (L) 08/24/2018   K 4.2 08/24/2018    08/24/2018   CO2 27 08/24/2018    (H) 08/24/2018   CA 8.9 08/24/2018   ALB 3.3 (L) by (CST): Trell Almanzar MD on 8/24/2018 at 8:17          Mri Brain (cpt=70551)    Result Date: 8/23/2018  CONCLUSION:  1.  This is a limited examination since once  it was realized that there was magnetic artifact in the right orbit, the examination was t atelectasis in both lungs. 3. Scattered bilateral subpleural/doug fissural lung nodules measuring up to 6 mm. Given morphology and location, these are most likely benign/incidental. 4. Mild emphysema. 5. Small hiatal hernia.  6. Severe thoracolumbar levosco controlled hypertension, hyperlipidemia. Patient will continue with statin, aspirin, antihypertensive control. Patient might require some rehabilitation. Otherwise no other neurological workup is needed at this time.   We will sign off    Yesica Tapia

## 2018-08-24 NOTE — SLP NOTE
ADULT SWALLOWING/SPEECH EVALUATION    ASSESSMENT    ASSESSMENT/OVERALL IMPRESSION:  SLP BSSE orders received and acknowledged. A swallow evaluation and S/L cognitive evaluation warranted as pt admitted to [address] [place] [address] on 8/23/18 with hypertension.  Further assessmen and year. Clock drawing appears functional with adequate placement of numbers and hands set to the correct time. Per the results of the SLUMS, pt exhibits deficits in short term memory and problem solving.  S/L services are warranted to support executive nodules measuring up to 6 mm. Given morphology and location, these are most likely benign/incidental.  4. Mild emphysema. 5. Small hiatal hernia. 6. Severe thoracolumbar levoscoliosis. 7. Lesser incidental findings as above.        OBJECTIVE   ORAL MOTOR tasks with 90% accuracy across 3 sessions.     In Progress     FOLLOW UP  Treatment Plan/Recommendations: Dysphagia therapy;Cognitive communication therapy  Number of Visits to Meet Established Goals: 3  Follow Up Needed: Yes  SLP Follow-up Date: 08/25/18

## 2018-08-24 NOTE — PROGRESS NOTES
[facility]    Progress Note    Scherrie  Patient Status:  Inpatient    1950 MRN [id number]   Location [facility] 3W/SW Attending Guido Bone Day # 1 PCP Franklin Coyne MD        Subjective: 08/23/2018   DDIMER 0.71 (H) 08/22/2018   MG 1.9 08/24/2018   TROP 0.00 08/22/2018       Xr Chest Pa + Lat Chest (cpt=71046)    Result Date: 8/22/2018  CONCLUSION: Mild interstitial edema, unchanged since 7/14/16.   Moderate elevation of the left hemidiaphr presumably metal. Patient did not experience any complaints of right or left eye visual disturbance. Correlate clinically. Results were called to Dr. Yvette Martino by the MRI technologist, Blessing Bower, and he requested that the examination be read as a limited  study. by (CST): Jessica Nolbe MD on 8/22/2018 at 18:26          Xr Orbits Screening R/o Fb Mri Em    Result Date: 8/23/2018  CONCLUSION:  1. 2 mm radiodense foreign body in the lateral aspect of the right orbit suggesting a metallic fragment.  Correlate clinica

## 2018-08-24 NOTE — PHYSICAL THERAPY NOTE
PHYSICAL THERAPY EVALUATION - INPATIENT     Room Number: 762/523-R  Evaluation Date: 8/24/2018  Type of Evaluation: Initial        Presenting Problem:  (mid brain stroke)  Reason for Therapy: Mobility Dysfunction and Discharge Planning    PHYSICAL THERAPY ASSESSMENT  Upper extremity ROM and strength are within functional limits     Lower extremity ROM is within functional limits     Lower extremity strength is within functional limits     BALANCE  Static Sitting: Poor +  Dynamic Sitting: Poor +  Static Saw Hogan transfers Sit to/from Stand at assistance level: minimum assistance with walker - rolling     Goal #2  Current Status    Goal #3 Patient is able to ambulate 400 feet with assist device: walker - rolling at assistance level: minimum assistance   Goal #3   C

## 2018-08-24 NOTE — H&P
[facility]    PATIENT'S NAME: Radha Lozano   ATTENDING PHYSICIAN: Humberto Gross MD   PATIENT ACCOUNT#:   [id number]    LOCATION:  [address] [place] [address] 107 RECORD #:   [id number]       YOB: 1950  ADMISSION DATE:       08/23/2018 retractions. HEART:  Regular rate and rhythm. S1, S2 auscultated. No murmur. ABDOMEN:  Soft, nondistended. No tenderness. Positive bowel sounds. EXTREMITIES:  No peripheral edema, clubbing, or cyanosis. NEUROLOGIC:  Motor and sensory intact.   Devendra Ghosh

## 2018-08-24 NOTE — CM/SW NOTE
SW received an MDO regarding discharge planning. Pt lives alone in [place] with 10 stairs to access 2nd floor bedrooms. Pt has family in the area who are available to assist pt upon discharge including his sister and niece.  Pt states that he is independe

## 2018-08-25 LAB
ANION GAP SERPL CALC-SCNC: 6 MMOL/L (ref 0–18)
BASOPHILS # BLD: 0 K/UL (ref 0–0.2)
BASOPHILS NFR BLD: 0 %
BUN SERPL-MCNC: 27 MG/DL (ref 8–20)
BUN/CREAT SERPL: 24.3 (ref 10–20)
CALCIUM SERPL-MCNC: 8.7 MG/DL (ref 8.5–10.5)
CHLORIDE SERPL-SCNC: 105 MMOL/L (ref 95–110)
CO2 SERPL-SCNC: 27 MMOL/L (ref 22–32)
CREAT SERPL-MCNC: 1.11 MG/DL (ref 0.5–1.5)
EOSINOPHIL # BLD: 0.2 K/UL (ref 0–0.7)
EOSINOPHIL NFR BLD: 2 %
ERYTHROCYTE [DISTWIDTH] IN BLOOD BY AUTOMATED COUNT: 13.4 % (ref 11–15)
GLUCOSE SERPL-MCNC: 107 MG/DL (ref 70–99)
HCT VFR BLD AUTO: 41 % (ref 41–52)
HGB BLD-MCNC: 14 G/DL (ref 13.5–17.5)
LYMPHOCYTES # BLD: 1.5 K/UL (ref 1–4)
LYMPHOCYTES NFR BLD: 18 %
MAGNESIUM SERPL-MCNC: 2 MG/DL (ref 1.8–2.5)
MCH RBC QN AUTO: 31.4 PG (ref 27–32)
MCHC RBC AUTO-ENTMCNC: 34.1 G/DL (ref 32–37)
MCV RBC AUTO: 92.1 FL (ref 80–100)
MONOCYTES # BLD: 0.9 K/UL (ref 0–1)
MONOCYTES NFR BLD: 10 %
NEUTROPHILS # BLD AUTO: 6.1 K/UL (ref 1.8–7.7)
NEUTROPHILS NFR BLD: 70 %
OSMOLALITY UR CALC.SUM OF ELEC: 292 MOSM/KG (ref 275–295)
PLATELET # BLD AUTO: 221 K/UL (ref 140–400)
PMV BLD AUTO: 9.1 FL (ref 7.4–10.3)
POTASSIUM SERPL-SCNC: 4.1 MMOL/L (ref 3.3–5.1)
RBC # BLD AUTO: 4.45 M/UL (ref 4.5–5.9)
SODIUM SERPL-SCNC: 138 MMOL/L (ref 136–144)
WBC # BLD AUTO: 8.7 K/UL (ref 4–11)

## 2018-08-25 PROCEDURE — 99233 SBSQ HOSP IP/OBS HIGH 50: CPT | Performed by: HOSPITALIST

## 2018-08-25 RX ORDER — DIAZEPAM 2 MG/1
2 TABLET ORAL EVERY 6 HOURS PRN
Status: DISCONTINUED | OUTPATIENT
Start: 2018-08-25 | End: 2018-08-26

## 2018-08-25 RX ORDER — MECLIZINE HYDROCHLORIDE 25 MG/1
25 TABLET ORAL 3 TIMES DAILY PRN
Status: DISCONTINUED | OUTPATIENT
Start: 2018-08-25 | End: 2018-08-26

## 2018-08-25 NOTE — PROGRESS NOTES
[facility] - [facility]    Progress Note    Lennie Mas Patient Status:  Inpatient    1950 MRN [id number]   Location [facility] 3W/SW Attending Guido Bone Day # 2 PCP Alicja Leavitt MD        Subjective: CO2 27 08/25/2018    (H) 08/25/2018   CA 8.7 08/25/2018   ALB 3.3 (L) 08/24/2018   ALKPHO 59 08/24/2018   BILT 1.0 08/24/2018   TP 6.8 08/24/2018   AST 21 08/24/2018   ALT 19 08/24/2018   INR 1.0 08/23/2018   DDIMER 0.71 (H) 08/22/2018   MG 2.0 08 Moderately advanced chronic appearing deep white matter ischemic change in the periventricular white matter bilaterally. No acute ischemic focus or large areas infarction is noted.  CEREBELLUM: No edema, hemorrhage, mass, acute infarction, or inappropriate Advanced chronic appearing deep white matter ischemic change in the periventricular white matter bilaterally. 4. Ferromagnetic artifact in the right orbit secondary to radiopaque foreign body on plain film radiography. Correlate clinically.  A detailed hist secondary to radiopaque foreign body on plain film radiography. Correlate clinically. A detailed history should be obtained as the patient's exposure to metal in the orbits.   Results regarding the acute to subacute area of infarction of the right brain celso

## 2018-08-25 NOTE — PLAN OF CARE
Problem: Patient Centered Care  Goal: Patient preferences are identified and integrated in the patient's plan of care  Interventions:  - What would you like us to know as we care for you?  I live alone  - Provide timely, complete, and accurate information t indicated  - Evaluate effectiveness of antiarrhythmic and heart rate control medications as ordered  - Initiate emergency measures for life threatening arrhythmias  - Monitor electrolytes and administer replacement therapy as ordered   Outcome: Progressing

## 2018-08-26 LAB
ANION GAP SERPL CALC-SCNC: 5 MMOL/L (ref 0–18)
BASOPHILS # BLD: 0.1 K/UL (ref 0–0.2)
BASOPHILS NFR BLD: 1 %
BUN SERPL-MCNC: 31 MG/DL (ref 8–20)
BUN/CREAT SERPL: 26.5 (ref 10–20)
CALCIUM SERPL-MCNC: 8.6 MG/DL (ref 8.5–10.5)
CHLORIDE SERPL-SCNC: 105 MMOL/L (ref 95–110)
CO2 SERPL-SCNC: 28 MMOL/L (ref 22–32)
CREAT SERPL-MCNC: 1.17 MG/DL (ref 0.5–1.5)
EOSINOPHIL # BLD: 0.2 K/UL (ref 0–0.7)
EOSINOPHIL NFR BLD: 3 %
ERYTHROCYTE [DISTWIDTH] IN BLOOD BY AUTOMATED COUNT: 13.5 % (ref 11–15)
GLUCOSE SERPL-MCNC: 101 MG/DL (ref 70–99)
HCT VFR BLD AUTO: 42.3 % (ref 41–52)
HGB BLD-MCNC: 14.2 G/DL (ref 13.5–17.5)
LYMPHOCYTES # BLD: 1.9 K/UL (ref 1–4)
LYMPHOCYTES NFR BLD: 22 %
MAGNESIUM SERPL-MCNC: 2 MG/DL (ref 1.8–2.5)
MCH RBC QN AUTO: 31.3 PG (ref 27–32)
MCHC RBC AUTO-ENTMCNC: 33.6 G/DL (ref 32–37)
MCV RBC AUTO: 93.2 FL (ref 80–100)
MONOCYTES # BLD: 0.9 K/UL (ref 0–1)
MONOCYTES NFR BLD: 11 %
NEUTROPHILS # BLD AUTO: 5.6 K/UL (ref 1.8–7.7)
NEUTROPHILS NFR BLD: 64 %
OSMOLALITY UR CALC.SUM OF ELEC: 293 MOSM/KG (ref 275–295)
PLATELET # BLD AUTO: 215 K/UL (ref 140–400)
PMV BLD AUTO: 9.2 FL (ref 7.4–10.3)
POTASSIUM SERPL-SCNC: 4.1 MMOL/L (ref 3.3–5.1)
RBC # BLD AUTO: 4.54 M/UL (ref 4.5–5.9)
SODIUM SERPL-SCNC: 138 MMOL/L (ref 136–144)
WBC # BLD AUTO: 8.7 K/UL (ref 4–11)

## 2018-08-26 PROCEDURE — 99232 SBSQ HOSP IP/OBS MODERATE 35: CPT | Performed by: OTHER

## 2018-08-26 PROCEDURE — 99233 SBSQ HOSP IP/OBS HIGH 50: CPT | Performed by: HOSPITALIST

## 2018-08-26 RX ORDER — MECLIZINE HYDROCHLORIDE 25 MG/1
25 TABLET ORAL EVERY 6 HOURS PRN
Status: DISCONTINUED | OUTPATIENT
Start: 2018-08-26 | End: 2018-08-27

## 2018-08-26 RX ORDER — DIAZEPAM 2 MG/1
1 TABLET ORAL EVERY 6 HOURS PRN
Status: DISCONTINUED | OUTPATIENT
Start: 2018-08-26 | End: 2018-08-27

## 2018-08-26 NOTE — SLP NOTE
SPEECH DAILY NOTE - INPATIENT    ASSESSMENT & PLAN   ASSESSMENT  Pt seen for meal assessment per recommendations of BSSE on 8/24/18. RN reports pt does not demonstrate any overt CSA across meals. Pt denies any difficulties swallowing.    Pt upright 90 degre therapy;Cognitive communication therapy    Interdisciplinary Communication: Discussed with RN    GOALS  Goal #1 The patient will tolerate REGULAR  consistency and THIN liquids without overt signs or symptoms of aspiration with 100 % accuracy over 2  sessio

## 2018-08-26 NOTE — CONSULTS
PHYSICAL MEDICINE AND REHABILITATION CONSULTATION     CC: Impaired mobility and ADL dysfunction secondary to right medullary CVA with ataxia      HPI: This is a 80year old gentleman who presented to [address] [place] [address] on 8/23/2018 with primary complaints of ongoing dizz 650 mg Rectal Q4H PRN   morphINE sulfate (PF) 2 MG/ML injection 1 mg 1 mg Intravenous Q2H PRN   Or      morphINE sulfate (PF) 2 MG/ML injection 2 mg 2 mg Intravenous Q2H PRN   Labetalol HCl (TRANDATE) injection 10 mg 10 mg Intravenous Q10 Min PRN   Senna ( History Main Topics    Smoking status: Never Smoker                                                                Smokeless tobacco: Never Used                        Alcohol use: Yes           0.0 oz/week       Comment: occ     Drug use: No            Ot nourished, NAD; friend at bedside  Eyes: conjunctiva and lids intact, pupils are equal and round  ENMT: external inspection of ears and nose within normal limits.  Normal functional hearing  Neck: supple, symmetrical, no thyromegaly appreciated  Lymph: no c be able to participate in 3 hours of therapy 5 days a week, and prior level of function indicates that patient is reasonably expected to make meaningful and measurable functional progress during a 10 day inpatient rehab stay.  Anticipated goals are to impro

## 2018-08-26 NOTE — PHYSICAL THERAPY NOTE
PHYSICAL THERAPY TREATMENT NOTE - INPATIENT     Room Number: 699/252-M       Presenting Problem:  (mid brain stroke)    Problem List  Principal Problem:    Dizziness  Active Problems:    Cerebrovascular accident (CVA) due to occlusion of right vertebral ar training    SUBJECTIVE  \"I'm tired, but I'll do what you tell me\"    OBJECTIVE  Precautions: Bed/chair alarm    WEIGHT BEARING RESTRICTION  Weight Bearing Restriction: None                PAIN ASSESSMENT   Ratin          BALANCE Stand at assistance level: minimum assistance with walker - rolling      Goal #2  Current Status  min A RW   Goal #3 Patient is able to ambulate 400 feet with assist device: walker - rolling at assistance level: minimum assistance   Goal #3   Current Statu

## 2018-08-26 NOTE — PROGRESS NOTES
[facility] - [facility]    Progress Note    Oral [facility] Patient Status:  Inpatient    1950 MRN [id number]   Location [facility] 3W/SW Attending Guido Bone Day # 3 PCP Ramy Jung MD        Subjective: (H) 08/26/2018    08/26/2018   K 4.1 08/26/2018    08/26/2018   CO2 28 08/26/2018    (H) 08/26/2018   CA 8.6 08/26/2018   ALB 3.3 (L) 08/24/2018   ALKPHO 59 08/24/2018   BILT 1.0 08/24/2018   TP 6.8 08/24/2018   AST 21 08/24/2018   ALT 1

## 2018-08-26 NOTE — OCCUPATIONAL THERAPY NOTE
OCCUPATIONAL THERAPY TREATMENT NOTE - INPATIENT        Room Number: 528/823-K           Presenting Problem: acute/subacute infarct in R aspect of Medulla of brainstem    Problem List  Principal Problem:    Dizziness  Active Problems:    Cerebrovascular acc rehabilitation  OT Device Recommendations: TBD     PLAN  OT Treatment Plan: Balance activities; ADL training;Functional transfer training; Endurance training;Neuromuscluar reeducation; Compensatory technique education    SUBJECTIVE  This is hard --in regards standing balance   Pt will complete LE dressing with SBA    NT                  Goals  on: 18  Frequency: 5x/wk

## 2018-08-27 ENCOUNTER — APPOINTMENT (OUTPATIENT)
Dept: GENERAL RADIOLOGY | Facility: HOSPITAL | Age: 68
DRG: 065 | End: 2018-08-27
Attending: HOSPITALIST
Payer: MEDICARE

## 2018-08-27 VITALS
TEMPERATURE: 98 F | WEIGHT: 187 LBS | BODY MASS INDEX: 29 KG/M2 | RESPIRATION RATE: 18 BRPM | HEART RATE: 91 BPM | OXYGEN SATURATION: 96 % | DIASTOLIC BLOOD PRESSURE: 77 MMHG | SYSTOLIC BLOOD PRESSURE: 117 MMHG

## 2018-08-27 LAB
ANION GAP SERPL CALC-SCNC: 10 MMOL/L (ref 0–18)
BASOPHILS # BLD: 0.1 K/UL (ref 0–0.2)
BASOPHILS NFR BLD: 0 %
BILIRUB UR QL: NEGATIVE
BUN SERPL-MCNC: 38 MG/DL (ref 8–20)
BUN/CREAT SERPL: 31.7 (ref 10–20)
CALCIUM SERPL-MCNC: 8.8 MG/DL (ref 8.5–10.5)
CHLORIDE SERPL-SCNC: 101 MMOL/L (ref 95–110)
CO2 SERPL-SCNC: 27 MMOL/L (ref 22–32)
COLOR UR: YELLOW
CREAT SERPL-MCNC: 1.2 MG/DL (ref 0.5–1.5)
EOSINOPHIL # BLD: 0.4 K/UL (ref 0–0.7)
EOSINOPHIL NFR BLD: 3 %
ERYTHROCYTE [DISTWIDTH] IN BLOOD BY AUTOMATED COUNT: 13.6 % (ref 11–15)
GLUCOSE SERPL-MCNC: 137 MG/DL (ref 70–99)
GLUCOSE UR-MCNC: NEGATIVE MG/DL
HCT VFR BLD AUTO: 46.1 % (ref 41–52)
HGB BLD-MCNC: 15 G/DL (ref 13.5–17.5)
HGB UR QL STRIP.AUTO: NEGATIVE
KETONES UR-MCNC: NEGATIVE MG/DL
LEUKOCYTE ESTERASE UR QL STRIP.AUTO: NEGATIVE
LYMPHOCYTES # BLD: 1.6 K/UL (ref 1–4)
LYMPHOCYTES NFR BLD: 14 %
MAGNESIUM SERPL-MCNC: 2.3 MG/DL (ref 1.8–2.5)
MCH RBC QN AUTO: 30.7 PG (ref 27–32)
MCHC RBC AUTO-ENTMCNC: 32.6 G/DL (ref 32–37)
MCV RBC AUTO: 94.3 FL (ref 80–100)
MONOCYTES # BLD: 1 K/UL (ref 0–1)
MONOCYTES NFR BLD: 9 %
NEUTROPHILS # BLD AUTO: 8.5 K/UL (ref 1.8–7.7)
NEUTROPHILS NFR BLD: 74 %
NITRITE UR QL STRIP.AUTO: NEGATIVE
OSMOLALITY UR CALC.SUM OF ELEC: 297 MOSM/KG (ref 275–295)
PH UR: 5 [PH] (ref 5–8)
PLATELET # BLD AUTO: 214 K/UL (ref 140–400)
PMV BLD AUTO: 9.1 FL (ref 7.4–10.3)
POTASSIUM SERPL-SCNC: 4.2 MMOL/L (ref 3.3–5.1)
PROT UR-MCNC: NEGATIVE MG/DL
RBC # BLD AUTO: 4.89 M/UL (ref 4.5–5.9)
SODIUM SERPL-SCNC: 138 MMOL/L (ref 136–144)
SP GR UR STRIP: 1.03 (ref 1–1.03)
UROBILINOGEN UR STRIP-ACNC: 2
VIT C UR-MCNC: NEGATIVE MG/DL
WBC # BLD AUTO: 11.5 K/UL (ref 4–11)

## 2018-08-27 PROCEDURE — 99239 HOSP IP/OBS DSCHRG MGMT >30: CPT | Performed by: HOSPITALIST

## 2018-08-27 PROCEDURE — 71046 X-RAY EXAM CHEST 2 VIEWS: CPT | Performed by: HOSPITALIST

## 2018-08-27 PROCEDURE — 99231 SBSQ HOSP IP/OBS SF/LOW 25: CPT | Performed by: OTHER

## 2018-08-27 RX ORDER — PSEUDOEPHEDRINE HCL 30 MG
100 TABLET ORAL 2 TIMES DAILY PRN
Qty: 30 CAPSULE | Refills: 0 | Status: SHIPPED | OUTPATIENT
Start: 2018-08-27 | End: 2019-05-28

## 2018-08-27 RX ORDER — DIAZEPAM 2 MG/1
1 TABLET ORAL EVERY 8 HOURS PRN
Qty: 30 TABLET | Refills: 0 | Status: SHIPPED | OUTPATIENT
Start: 2018-08-27 | End: 2018-09-12

## 2018-08-27 RX ORDER — DIAZEPAM 2 MG/1
1 TABLET ORAL EVERY 8 HOURS PRN
Status: DISCONTINUED | OUTPATIENT
Start: 2018-08-27 | End: 2018-08-27

## 2018-08-27 RX ORDER — MECLIZINE HYDROCHLORIDE 25 MG/1
25 TABLET ORAL EVERY 6 HOURS PRN
Qty: 30 TABLET | Refills: 0 | Status: SHIPPED | OUTPATIENT
Start: 2018-08-27 | End: 2018-09-12

## 2018-08-27 RX ORDER — ATORVASTATIN CALCIUM 20 MG/1
20 TABLET, FILM COATED ORAL NIGHTLY
Qty: 30 TABLET | Refills: 0 | Status: SHIPPED | OUTPATIENT
Start: 2018-08-27 | End: 2018-10-09

## 2018-08-27 RX ORDER — ASPIRIN 325 MG
325 TABLET ORAL DAILY
Qty: 30 TABLET | Refills: 0 | Status: SHIPPED | OUTPATIENT
Start: 2018-08-28 | End: 2019-02-13

## 2018-08-27 NOTE — OCCUPATIONAL THERAPY NOTE
OCCUPATIONAL THERAPY TREATMENT NOTE - INPATIENT        Room Number: 114/126-T           Presenting Problem: acute/subacute infarct in R aspect of Medulla of brainstem    Problem List  Principal Problem:    Dizziness  Active Problems:    Cerebrovascular acc PLAN  OT Treatment Plan: Balance activities; ADL training;Functional transfer training; Endurance training;Neuromuscluar reeducation; Compensatory technique education    SUBJECTIVE  Agreeable to activity     OBJECTIVE  Precautions: Bed/chair alarm    WEIGHT with SBA    NT                  Goals  on: 18  Frequency: 5x/wk

## 2018-08-27 NOTE — SLP NOTE
SPEECH DAILY NOTE - INPATIENT    ASSESSMENT & PLAN   ASSESSMENT  Pt seen for meal assessment as pt downgraded to mechanical soft solids on 8/26/18. RN reports pt tolerating diet well. Pt reports increased appetite. Pt upright 90 degrees in bedside chair. therapy;Cognitive communication therapy    Interdisciplinary Communication: Discussed with RN    GOALS     GOALS  Goal #1 The patient will tolerate MECH SOFT consistency and THIN liquids without overt signs or symptoms of aspiration with 100 % accuracy ove

## 2018-08-27 NOTE — DISCHARGE SUMMARY
More than 30 min spent on dc  Dc summary # [id number]  Hospital Discharge Diagnoses: cva    Lace+ Score: 51  59-90 High Risk  29-58 Medium Risk  0-28   Low Risk. TCM Follow-Up Recommendation:  LACE 29-58:  Moderate Risk of readmission after discharge from

## 2018-08-27 NOTE — PLAN OF CARE
CARDIOVASCULAR - ADULT    • Maintains optimal cardiac output and hemodynamic stability Completed    • Absence of cardiac arrhythmias or at baseline Completed        NEUROLOGICAL - ADULT    • Achieves stable or improved neurological status Completed

## 2018-08-27 NOTE — CM/SW NOTE
ADDENDUM 2:17PM    TALON informed by RN that pt is medically stable for discharge today at 5:00pm. TALON spoke with [facility] liaison [phone] who confirmed that they are able to accept pt into room [phone] at that time.  Talon spoke with pt's sister Garland Chodebra

## 2018-08-27 NOTE — PROGRESS NOTES
Neurology Inpatient Follow-up Note      HPI:     Patient being seen in follow-up. Continues to complain of ataxia although says overall his dizziness is better. His appetite is improved. He complains of constipation.       Past Medical Hisotory:  [facility] VACCINE ADMINISTRATION RECORD  PARENT / GUARDIAN APPROVAL  Date: 2022  Vaccine administered to: Mike Campos     : 2005    MRN: [id number]    A copy of the appropriate Centers for Disease Control and Prevention Vaccine Information statement has been provided. I have read or have had explained the information about the diseases and the vaccines listed below. There was an opportunity to ask questions and any questions were answered satisfactorily. I believe that I understand the benefits and risks of the vaccine cited and ask that the vaccine(s) listed below be given to me or to the person named above (for whom I am authorized to make this request). VACCINES ADMINISTERED:  Tdap and Menveo     I have read and hereby agree to be bound by the terms of this agreement as stated above. My signature is valid until revoked by me in writing. This document is signed by parents, relationship: Mother on 2022.:                                                                                                                                         Parent / Claudia Harada Signature                                                Date    Gustavo Velasquez served as a witness to authentication that the identity of the person signing electronically is in fact the person represented as signing. This document was generated by [employer] on 2022.

## 2018-08-27 NOTE — PROGRESS NOTES
Neurology Inpatient Follow-up Note      HPI:     Patient being seen in evaluation for continued dizziness. He was admitted on 8/23/18 for imbalance. He was found to have a right medullary stroke.   Neurology reconsulted as patient continues to have dizzin Patient did not experience any complaints of right or left eye visual disturbance. Correlate clinically. Results were called to Dr. Geno Gallegos by the MRI technologist, Harriett Stiles, and he requested that the examination be read as a limited   study.   2. There is a s

## 2018-08-27 NOTE — PHYSICAL THERAPY NOTE
PHYSICAL THERAPY TREATMENT NOTE - INPATIENT     Room Number: 097/149-D       Presenting Problem:  (mid brain stroke)    Problem List  Principal Problem:    Dizziness  Active Problems:    Cerebrovascular accident (CVA) due to occlusion of right vertebral ar session. Discussed and instructed pt to keep eyes open during conversation, as part of HEP. Also discussed HEP to include postural alignment, proper integration of RW and Eyes open with functional activity. Pt in understanding.  Pt left up in chair with OT Standardized Score (AM-PAC Scale): 40.78   CMS Modifier (G-Code): CK    FUNCTIONAL ABILITY STATUS  Gait Assessment   Gait Assistance:  Moderate assistance  Distance (ft): 200  Assistive Device: Rolling walker  Pattern: Shuffle (R side listing, kyphotic po

## 2018-08-29 NOTE — DISCHARGE SUMMARY
[facility]    PATIENT'S NAME: Ton KAURdebra Ellison   ATTENDING PHYSICIAN: Geoff Bone MD   PATIENT ACCOUNT#:   [id number]    LOCATION:  [address] [place] [address] RECORD #:   [id number]       YOB: 1950  ADMISSION DATE:       08/23/ coherent. He is speaking normally. He does state his dizziness is somewhat better. LABORATORY STUDIES:  Please see chart. ASSESSMENT AND PLAN:    1.    Status post lateral medullary cerebrovascular accident on right causing ataxia and dizziness, pro

## 2018-09-06 ENCOUNTER — TELEPHONE (OUTPATIENT)
Dept: INTERNAL MEDICINE CLINIC | Facility: CLINIC | Age: 68
End: 2018-09-06

## 2018-09-06 NOTE — TELEPHONE ENCOUNTER
Dr called requesting to speak with on call  reg pts chronic edema. Dr stated ok to send message not urgent.

## 2018-09-12 ENCOUNTER — OFFICE VISIT (OUTPATIENT)
Dept: SURGERY | Facility: CLINIC | Age: 68
End: 2018-09-12
Payer: MEDICARE

## 2018-09-12 VITALS
WEIGHT: 187 LBS | HEART RATE: 79 BPM | TEMPERATURE: 98 F | DIASTOLIC BLOOD PRESSURE: 82 MMHG | HEIGHT: 66 IN | BODY MASS INDEX: 30.05 KG/M2 | SYSTOLIC BLOOD PRESSURE: 154 MMHG

## 2018-09-12 DIAGNOSIS — R33.9 URINARY RETENTION: Primary | ICD-10-CM

## 2018-09-12 PROCEDURE — 99204 OFFICE O/P NEW MOD 45 MIN: CPT | Performed by: UROLOGY

## 2018-09-12 PROCEDURE — G0463 HOSPITAL OUTPT CLINIC VISIT: HCPCS | Performed by: UROLOGY

## 2018-09-12 RX ORDER — METOPROLOL SUCCINATE 50 MG/1
50 TABLET, EXTENDED RELEASE ORAL DAILY
COMMUNITY
End: 2018-10-09

## 2018-09-12 RX ORDER — FINASTERIDE 5 MG/1
5 TABLET, FILM COATED ORAL DAILY
Qty: 90 TABLET | Refills: 1 | Status: SHIPPED | OUTPATIENT
Start: 2018-09-12 | End: 2018-11-15

## 2018-09-12 RX ORDER — POLYETHYLENE GLYCOL 3350 17 G/17G
17 POWDER, FOR SOLUTION ORAL DAILY
Status: ON HOLD | COMMUNITY
End: 2019-04-13

## 2018-09-12 RX ORDER — LORATADINE 10 MG/1
10 TABLET ORAL DAILY
Status: ON HOLD | COMMUNITY
End: 2019-04-13

## 2018-09-12 RX ORDER — FAMOTIDINE 20 MG/1
20 TABLET ORAL 2 TIMES DAILY
COMMUNITY
End: 2018-10-24

## 2018-09-12 RX ORDER — GUAIFENESIN AND DEXTROMETHORPHAN HYDROBROMIDE 100; 10 MG/5ML; MG/5ML
5 SOLUTION ORAL EVERY 12 HOURS
COMMUNITY
End: 2018-11-15 | Stop reason: ALTCHOICE

## 2018-09-12 RX ORDER — AMLODIPINE BESYLATE 5 MG/1
5 TABLET ORAL DAILY
COMMUNITY
End: 2018-10-09

## 2018-09-12 RX ORDER — TAMSULOSIN HYDROCHLORIDE 0.4 MG/1
0.4 CAPSULE ORAL DAILY
Qty: 90 CAPSULE | Refills: 1 | Status: SHIPPED | OUTPATIENT
Start: 2018-09-12 | End: 2019-08-09

## 2018-09-12 RX ORDER — FINASTERIDE 5 MG/1
5 TABLET, FILM COATED ORAL DAILY
COMMUNITY
End: 2018-10-09

## 2018-09-12 RX ORDER — TAMSULOSIN HYDROCHLORIDE 0.4 MG/1
CAPSULE ORAL DAILY
COMMUNITY
End: 2018-10-09

## 2018-09-12 RX ORDER — BENAZEPRIL HYDROCHLORIDE 10 MG/1
10 TABLET ORAL DAILY
COMMUNITY
End: 2018-09-13

## 2018-09-12 NOTE — PROGRESS NOTES
[facility]  Initial Office Consultation    HPI:   Oly Zacarias is a 80year old male here today in consultation for urinary retention.   His PCP is Dr. Vipul Butst.    Patient was recently admitted to [facility] on 8/23/2018 and found to have a medu Syrup Take 5 mL by mouth every 12 (twelve) hours. Disp:  Rfl:    loratadine 10 MG Oral Tab Take 10 mg by mouth daily. Disp:  Rfl:    Metoprolol Succinate ER 50 MG Oral Tablet 24 Hr Take 50 mg by mouth daily.  Disp:  Rfl:    Polyethylene Glycol 3350 Oral Pow m)   Wt 187 lb (84.8 kg)   BMI 30.18 kg/m²     Physical Exam    Constitutional: He is oriented to person, place, and time. He appears well-developed and well-nourished. No distress. HENT:   Head: Normocephalic and atraumatic.    Eyes: Conjunctivae are nor finding of urinary retention. This could be either due to an element of BPH which got exacerbated with his stroke and recent immobilization or could be related to a neurogenic bladder following his CVA.     I explained to him that we can give him a trial o

## 2018-09-13 ENCOUNTER — OFFICE VISIT (OUTPATIENT)
Dept: INTERNAL MEDICINE CLINIC | Facility: CLINIC | Age: 68
End: 2018-09-13
Payer: MEDICARE

## 2018-09-13 VITALS
HEIGHT: 64 IN | SYSTOLIC BLOOD PRESSURE: 152 MMHG | TEMPERATURE: 98 F | HEART RATE: 85 BPM | BODY MASS INDEX: 32.61 KG/M2 | DIASTOLIC BLOOD PRESSURE: 88 MMHG | WEIGHT: 191 LBS | OXYGEN SATURATION: 98 %

## 2018-09-13 DIAGNOSIS — R35.0 BENIGN PROSTATIC HYPERPLASIA WITH URINARY FREQUENCY: ICD-10-CM

## 2018-09-13 DIAGNOSIS — R05.3 CHRONIC COUGH: ICD-10-CM

## 2018-09-13 DIAGNOSIS — N40.1 BENIGN PROSTATIC HYPERPLASIA WITH URINARY FREQUENCY: ICD-10-CM

## 2018-09-13 DIAGNOSIS — I63.211 CEREBROVASCULAR ACCIDENT (CVA) DUE TO OCCLUSION OF RIGHT VERTEBRAL ARTERY (HCC): Primary | ICD-10-CM

## 2018-09-13 DIAGNOSIS — E78.00 PURE HYPERCHOLESTEROLEMIA: ICD-10-CM

## 2018-09-13 DIAGNOSIS — I87.2 VENOUS INSUFFICIENCY (CHRONIC) (PERIPHERAL): ICD-10-CM

## 2018-09-13 DIAGNOSIS — I10 ESSENTIAL HYPERTENSION: ICD-10-CM

## 2018-09-13 PROCEDURE — G0463 HOSPITAL OUTPT CLINIC VISIT: HCPCS | Performed by: INTERNAL MEDICINE

## 2018-09-13 PROCEDURE — 1111F DSCHRG MED/CURRENT MED MERGE: CPT | Performed by: INTERNAL MEDICINE

## 2018-09-13 PROCEDURE — 99214 OFFICE O/P EST MOD 30 MIN: CPT | Performed by: INTERNAL MEDICINE

## 2018-09-13 RX ORDER — LOSARTAN POTASSIUM 50 MG/1
50 TABLET ORAL DAILY
Qty: 90 TABLET | Refills: 0 | Status: SHIPPED | OUTPATIENT
Start: 2018-09-13 | End: 2018-10-09

## 2018-09-13 NOTE — PROGRESS NOTES
HPI:    Patient ID: Ranjit Yancey is a 80year old male. Patient presents today for post hospital/rehab ffup. He was initially admitted at [facility] after he presented with complaint of unsteady gait and dizziness. He was then found to have stroke.  Pt had n Oral Tab Take 1 tablet (20 mg total) by mouth nightly. Disp: 30 tablet Rfl: 0   docusate sodium 100 MG Oral Cap Take 100 mg by mouth 2 (two) times daily as needed for constipation.  Disp: 30 capsule Rfl: 0   Amlodipine Besy-Benazepril HCl (LOTREL) 5-10 MG O po daily due to his chronic cough which could be ace induced coughing. We will see him back in 4 weeks for recheck of bp and his couging.     (E78.00) Pure hypercholesterolemia  Plan: pt started on atorvastatin 20mg daily at [address] [place] [address].  Pt told needs to ff low ch

## 2018-10-09 ENCOUNTER — OFFICE VISIT (OUTPATIENT)
Dept: INTERNAL MEDICINE CLINIC | Facility: CLINIC | Age: 68
End: 2018-10-09
Payer: MEDICARE

## 2018-10-09 VITALS
HEIGHT: 67 IN | HEART RATE: 95 BPM | SYSTOLIC BLOOD PRESSURE: 120 MMHG | WEIGHT: 190 LBS | TEMPERATURE: 98 F | OXYGEN SATURATION: 97 % | RESPIRATION RATE: 16 BRPM | BODY MASS INDEX: 29.82 KG/M2 | DIASTOLIC BLOOD PRESSURE: 80 MMHG

## 2018-10-09 DIAGNOSIS — I10 ESSENTIAL HYPERTENSION: ICD-10-CM

## 2018-10-09 DIAGNOSIS — E78.00 HYPERCHOLESTEREMIA: ICD-10-CM

## 2018-10-09 DIAGNOSIS — R05.3 CHRONIC COUGH: Primary | ICD-10-CM

## 2018-10-09 PROCEDURE — G0463 HOSPITAL OUTPT CLINIC VISIT: HCPCS | Performed by: INTERNAL MEDICINE

## 2018-10-09 PROCEDURE — 99214 OFFICE O/P EST MOD 30 MIN: CPT | Performed by: INTERNAL MEDICINE

## 2018-10-09 RX ORDER — AMLODIPINE BESYLATE 5 MG/1
5 TABLET ORAL DAILY
Qty: 90 TABLET | Refills: 0 | Status: ON HOLD | OUTPATIENT
Start: 2018-10-09 | End: 2019-04-19

## 2018-10-09 RX ORDER — TAMSULOSIN HYDROCHLORIDE 0.4 MG/1
0.4 CAPSULE ORAL DAILY
Qty: 90 CAPSULE | Refills: 0 | Status: SHIPPED | OUTPATIENT
Start: 2018-10-09 | End: 2018-11-15

## 2018-10-09 RX ORDER — LOSARTAN POTASSIUM 50 MG/1
50 TABLET ORAL DAILY
Qty: 90 TABLET | Refills: 0 | Status: SHIPPED | OUTPATIENT
Start: 2018-10-09 | End: 2019-03-07

## 2018-10-09 RX ORDER — METOPROLOL TARTRATE 50 MG/1
50 TABLET, FILM COATED ORAL 2 TIMES DAILY
Qty: 180 TABLET | Refills: 0 | Status: SHIPPED | OUTPATIENT
Start: 2018-10-09 | End: 2018-12-25

## 2018-10-09 RX ORDER — ATORVASTATIN CALCIUM 20 MG/1
20 TABLET, FILM COATED ORAL NIGHTLY
Qty: 90 TABLET | Refills: 0 | Status: SHIPPED | OUTPATIENT
Start: 2018-10-09 | End: 2019-01-02

## 2018-10-09 RX ORDER — METOPROLOL TARTRATE 50 MG/1
TABLET, FILM COATED ORAL
Refills: 0 | COMMUNITY
Start: 2018-09-11 | End: 2018-10-09

## 2018-10-09 RX ORDER — FINASTERIDE 5 MG/1
5 TABLET, FILM COATED ORAL DAILY
Qty: 90 TABLET | Refills: 0 | Status: SHIPPED | OUTPATIENT
Start: 2018-10-09 | End: 2019-05-28

## 2018-10-17 ENCOUNTER — HOSPITAL ENCOUNTER (OUTPATIENT)
Dept: CT IMAGING | Facility: HOSPITAL | Age: 68
Discharge: HOME | End: 2018-10-17
Payer: MEDICARE

## 2018-10-17 DIAGNOSIS — S05.40XA FOREIGN BODY BEHIND THE EYE: ICD-10-CM

## 2018-10-17 PROCEDURE — 70480 CT ORBIT/EAR/FOSSA W/O DYE: CPT

## 2018-10-17 PROCEDURE — 70480 CT ORBIT/EAR/FOSSA W/O DYE: CPT | Performed by: OPHTHALMOLOGY

## 2018-10-24 RX ORDER — FAMOTIDINE 20 MG/1
TABLET ORAL
Qty: 60 TABLET | Refills: 2 | Status: SHIPPED | OUTPATIENT
Start: 2018-10-24 | End: 2019-05-28

## 2018-10-25 NOTE — TELEPHONE ENCOUNTER
Refill passed per [facility] protocol.     Cholesterol Medications  Protocol Criteria:  · Appointment scheduled in the past 12 months or in the next 3 months  · ALT & LDL on file in the past 12 months  · ALT result < 80  · LDL result <130   Recent Outp

## 2018-10-26 ENCOUNTER — TELEPHONE (OUTPATIENT)
Dept: OTHER | Age: 68
End: 2018-10-26

## 2018-10-26 DIAGNOSIS — R60.0 BILATERAL LEG EDEMA: Primary | ICD-10-CM

## 2018-10-26 NOTE — TELEPHONE ENCOUNTER
Diclofenac is an nsaids and will not help leg edema. He has venous insufficiency. I will order venous US to check for malfunctioning veins causing leg swelling. We can also refer him to IR Dr Ramakrishna Deleon who manages also venous problems.  For now leg elevation and

## 2018-10-26 NOTE — TELEPHONE ENCOUNTER
Asking for a refill. Patient was seen on 10/9/18 and his legs especially his right leg continues to be swollen.    He saw Dr. Sunil Vidales a neurologist who prescribed him Diclofenac 50 mg TID as needed on 7/12/17    The patient stated while he was on the [facility]

## 2018-10-31 NOTE — TELEPHONE ENCOUNTER
Patient's brother calling in regards to refill, reports [facility] denied a script being available. When [facility] was contacted they reported medication was filled and already picked up.  Explained to brother the medication is known as Pepcid and Famotidine

## 2018-11-01 ENCOUNTER — TELEPHONE (OUTPATIENT)
Dept: OTHER | Age: 68
End: 2018-11-01

## 2018-11-01 NOTE — TELEPHONE ENCOUNTER
Pt's brother-in-law, Anh Abernathy, called with question regarding Famotidine as prescribed 10/24/18. Noted med was prescribed twice daily and is asking when pt should take the medication.  Advised Joey pt can take the med with breakfast and dinner, he wi

## 2018-11-08 NOTE — PROGRESS NOTES
Anesthesia Evaluation     Patient summary reviewed and Nursing notes reviewed   no history of anesthetic complications:  NPO Solid Status: > 8 hours  NPO Liquid Status: > 8 hours           Airway   Mallampati: II  TM distance: >3 FB  Neck ROM: full  No difficulty expected  Dental - normal exam     Pulmonary - negative pulmonary ROS and normal exam   Cardiovascular - normal exam  Exercise tolerance: poor (<4 METS)    ECG reviewed    (+) hypertension well controlled,     ROS comment: 11/5/18  Sinus tachycardia rate 107    Neuro/Psych  (+) seizures well controlled,     GI/Hepatic/Renal/Endo - negative ROS     Musculoskeletal     (+) back pain,   Abdominal  - normal exam   Substance History - negative use     OB/GYN negative ob/gyn ROS         Other   (+) arthritis     ROS/Med Hx Other: Mediastinal mass  Nodule left upper lung                  Anesthesia Plan    ASA 3     general   (Risks and benefits of general anesthesia discussed with patient, including, aspiration, recall, dental damage, cardiac or respiratory compromise, stroke, fluctuations in blood pressure, seizure or death.     Pt advised that a endotracheal tube (ETT), laryngeal mask airway (LMA) or mask would be utilized to maintain the airway. Pt verbalized understanding and agreed to plan.)  intravenous induction   Anesthetic plan, all risks, benefits, and alternatives have been provided, discussed and informed consent has been obtained with: patient.       HPI:    Patient ID: Oral Collazo is a 80year old male. Hypertension   This is a chronic problem. The current episode started more than 1 year ago. The problem is controlled. Associated symptoms include peripheral edema.  Pertinent negatives include n MG Oral Tab Take 1 tablet (5 mg total) by mouth daily. Disp: 90 tablet Rfl: 0   famoTIDine 20 MG Oral Tab Take 20 mg by mouth 2 (two) times daily. Disp:  Rfl:    loratadine 10 MG Oral Tab Take 10 mg by mouth daily.  Disp:  Rfl:    tamsulosin HCl 0.4 MG Oral exhibits no tenderness. Lymphadenopathy:     He has no cervical adenopathy. Neurological: He is alert.               ASSESSMENT/PLAN:   (R05) Chronic cough  (primary encounter diagnosis)  Plan: resolved since we had switched his benazepril to losartan s

## 2018-11-12 ENCOUNTER — TELEPHONE (OUTPATIENT)
Dept: OTHER | Age: 68
End: 2018-11-12

## 2018-11-12 ENCOUNTER — HOSPITAL ENCOUNTER (EMERGENCY)
Facility: HOSPITAL | Age: 68
Discharge: HOME | End: 2018-11-12
Attending: EMERGENCY MEDICINE
Payer: MEDICARE

## 2018-11-12 VITALS
OXYGEN SATURATION: 98 % | HEART RATE: 78 BPM | RESPIRATION RATE: 18 BRPM | WEIGHT: 187 LBS | HEIGHT: 66 IN | BODY MASS INDEX: 30.05 KG/M2 | TEMPERATURE: 98 F | DIASTOLIC BLOOD PRESSURE: 81 MMHG | SYSTOLIC BLOOD PRESSURE: 154 MMHG

## 2018-11-12 DIAGNOSIS — I10 UNCONTROLLED HYPERTENSION: Primary | ICD-10-CM

## 2018-11-12 PROCEDURE — 99282 EMERGENCY DEPT VISIT SF MDM: CPT

## 2018-11-12 NOTE — TELEPHONE ENCOUNTER
Pt states he ate out this past weekend and ate foods such as duarte, scrambled eggs, and steak. States he went to the ER because he wasn't feeling well and thought it was related to high blood pressure, just returned home. . Pt states he had taken all of his

## 2018-11-12 NOTE — ED INITIAL ASSESSMENT (HPI)
Patient reports here for a \"blood pressure check\" because he ate eggs and duarte yesterday and then ate steak and potatoes last night.  He is concerned his BP went up due to eating these meals    tooks meds as prescribed this AM

## 2018-11-13 ENCOUNTER — HOSPITAL ENCOUNTER (OUTPATIENT)
Dept: ULTRASOUND IMAGING | Facility: HOSPITAL | Age: 68
Discharge: HOME | End: 2018-11-13
Attending: INTERNAL MEDICINE
Payer: MEDICARE

## 2018-11-13 DIAGNOSIS — R60.0 BILATERAL LEG EDEMA: ICD-10-CM

## 2018-11-13 PROCEDURE — 93970 EXTREMITY STUDY: CPT | Performed by: INTERNAL MEDICINE

## 2018-11-13 NOTE — ED PROVIDER NOTES
Patient Seen in: [facility] AND [facility] Emergency Department    History   Patient presents with:  Hypertension (cardiovascular)      HPI    Presents to the ED requesting blood pressure check.   He states he is concerned his blood pressure is high as he had celso Alcohol use: Yes        Alcohol/week: 0.0 oz        Comment: occ       Drug use: No      Sexual activity: Not on file    Other Topics      Concerns:         Service: Not Asked        Blood Transfusions: Not Asked        Caffeine Concern:  Yes affect. His behavior is normal.   Nursing note and vitals reviewed.       ED Course      Labs Reviewed - No data to display      Imaging Results Available and Reviewed while in ED:     ED Medications Administered: Medications - No data to display      MDM Impression:  Uncontrolled hypertension  (primary encounter diagnosis)    Disposition:  Discharge    Follow-up:  Pedro Hill MD  [address]  [phone]    Schedule an appointment as soon as possible for a visi

## 2018-11-13 NOTE — TELEPHONE ENCOUNTER
Pt was called and inform informed of Dr. Nathalia Parra message below. Pt will see Im Wingert 87 on Thursday. Pt then wanted to know if he should take a extra amlodipine. Pt was advised he should not take any additional B/p medications.  Pt was advised to buy a

## 2018-11-15 ENCOUNTER — OFFICE VISIT (OUTPATIENT)
Dept: INTERNAL MEDICINE CLINIC | Facility: CLINIC | Age: 68
End: 2018-11-15
Payer: MEDICARE

## 2018-11-15 VITALS
DIASTOLIC BLOOD PRESSURE: 78 MMHG | HEIGHT: 67 IN | HEART RATE: 76 BPM | WEIGHT: 190 LBS | BODY MASS INDEX: 29.82 KG/M2 | SYSTOLIC BLOOD PRESSURE: 124 MMHG

## 2018-11-15 DIAGNOSIS — I87.2 VENOUS INSUFFICIENCY OF BOTH LOWER EXTREMITIES: ICD-10-CM

## 2018-11-15 DIAGNOSIS — I10 ESSENTIAL HYPERTENSION: Primary | ICD-10-CM

## 2018-11-15 PROCEDURE — G0463 HOSPITAL OUTPT CLINIC VISIT: HCPCS | Performed by: INTERNAL MEDICINE

## 2018-11-15 PROCEDURE — 99214 OFFICE O/P EST MOD 30 MIN: CPT | Performed by: INTERNAL MEDICINE

## 2018-11-15 RX ORDER — BENAZEPRIL HYDROCHLORIDE 10 MG/1
TABLET ORAL
Refills: 0 | COMMUNITY
Start: 2018-10-17 | End: 2018-11-15

## 2018-11-15 NOTE — PROGRESS NOTES
HPI:    Patient ID: Jolene Morris is a 80year old male. Hypertension   This is a chronic (pt here for postER ffup, went to ER due to sudden elevated of his  bp but after eating duarte) problem. The current episode started more than 1 year ago.  The pro MG Oral Cap Take 1 capsule (0.4 mg total) by mouth daily. Take 1/2 hour following the same meal each day Disp: 90 capsule Rfl: 1   aspirin 325 MG Oral Tab Take 1 tablet (325 mg total) by mouth daily.  With food Disp: 30 tablet Rfl: 0   docusate sodium 100 M and could have contributed to his elevated bp. I told him to continue with current bp meds and ff low salt diet. Monitor bp at home and call if elevated.      (I87.2) Venous insufficiency of both lower extremities  Plan: OP REFERRAL TO INTERVENTIONAL RADIOL

## 2018-12-03 ENCOUNTER — TELEPHONE (OUTPATIENT)
Dept: INTERNAL MEDICINE CLINIC | Facility: CLINIC | Age: 68
End: 2018-12-03

## 2018-12-03 NOTE — TELEPHONE ENCOUNTER
Pt states he feels a little cold some times only when goes to bed at night (not every night). Pt wants to get EL's opinion if that is because he is healing from his mini stroke or if it might just be that his heat needs to be turned up?  Yes that is his que

## 2018-12-03 NOTE — TELEPHONE ENCOUNTER
He can have salad but just limit the salad dressing. As to his question of being cold, may want to increase temp at home and see how he feels after.

## 2018-12-25 RX ORDER — METOPROLOL TARTRATE 50 MG/1
TABLET, FILM COATED ORAL
Qty: 180 TABLET | Refills: 0 | Status: SHIPPED | OUTPATIENT
Start: 2018-12-25 | End: 2019-04-08

## 2019-01-03 RX ORDER — ATORVASTATIN CALCIUM 20 MG/1
TABLET, FILM COATED ORAL
Qty: 90 TABLET | Refills: 0 | Status: SHIPPED | OUTPATIENT
Start: 2019-01-03 | End: 2019-04-06

## 2019-01-15 ENCOUNTER — TELEPHONE (OUTPATIENT)
Dept: FAMILY MEDICINE CLINIC | Facility: CLINIC | Age: 69
End: 2019-01-15

## 2019-01-15 NOTE — TELEPHONE ENCOUNTER
Whatever exercise the physical therapist was giving him before, he should continue himself to build up his strength and conditioning

## 2019-01-15 NOTE — TELEPHONE ENCOUNTER
Pt calling with questions on how to better heal from his mini stroke.  States completed 8 days of PT and was told no longer needed it, but still walking more slowly than pre stroke, he gets tired earlier than before and can only drive short distances due to

## 2019-01-17 RX ORDER — BENAZEPRIL HYDROCHLORIDE 10 MG/1
TABLET ORAL
Qty: 90 TABLET | Refills: 0 | OUTPATIENT
Start: 2019-01-17

## 2019-02-13 ENCOUNTER — OFFICE VISIT (OUTPATIENT)
Dept: INTERNAL MEDICINE CLINIC | Facility: CLINIC | Age: 69
End: 2019-02-13
Payer: MEDICARE

## 2019-02-13 VITALS
HEIGHT: 61 IN | HEART RATE: 73 BPM | SYSTOLIC BLOOD PRESSURE: 123 MMHG | TEMPERATURE: 98 F | BODY MASS INDEX: 37.76 KG/M2 | WEIGHT: 200 LBS | DIASTOLIC BLOOD PRESSURE: 76 MMHG | RESPIRATION RATE: 12 BRPM

## 2019-02-13 DIAGNOSIS — E78.00 PURE HYPERCHOLESTEROLEMIA: ICD-10-CM

## 2019-02-13 DIAGNOSIS — Z86.73 HISTORY OF CVA (CEREBROVASCULAR ACCIDENT): ICD-10-CM

## 2019-02-13 DIAGNOSIS — I10 ESSENTIAL HYPERTENSION: Primary | ICD-10-CM

## 2019-02-13 DIAGNOSIS — Z12.5 PROSTATE CANCER SCREENING: ICD-10-CM

## 2019-02-13 DIAGNOSIS — R73.03 PREDIABETES: ICD-10-CM

## 2019-02-13 DIAGNOSIS — I87.2 VENOUS INSUFFICIENCY (CHRONIC) (PERIPHERAL): ICD-10-CM

## 2019-02-13 PROBLEM — R42 DIZZINESS: Status: RESOLVED | Noted: 2018-08-23 | Resolved: 2019-02-13

## 2019-02-13 PROCEDURE — G0463 HOSPITAL OUTPT CLINIC VISIT: HCPCS | Performed by: INTERNAL MEDICINE

## 2019-02-13 PROCEDURE — 99214 OFFICE O/P EST MOD 30 MIN: CPT | Performed by: INTERNAL MEDICINE

## 2019-02-13 NOTE — PROGRESS NOTES
HPI:    Patient ID: Elijha Abraham is a 77year old male. HTN   This is a chronic problem. The current episode started more than 1 year ago. The problem is controlled. Pertinent negatives include no chest pain, peripheral edema or shortness of breath. tablet (5 mg total) by mouth daily. Disp: 90 tablet Rfl: 0   AmLODIPine Besylate 5 MG Oral Tab Take 1 tablet (5 mg total) by mouth daily. Disp: 90 tablet Rfl: 0   loratadine 10 MG Oral Tab Take 10 mg by mouth daily.  Disp:  Rfl:    Polyethylene Glycol 3350 meds. Cpm.     (E78.00) Pure hypercholesterolemia  Plan: COMP METABOLIC PANEL (14), LIPID PANEL       Check lipid panel and cmp; continue with low chol diet and chol med.      (Z12.26) History of CVA (cerebrovascular accident)  Plan: pt had been doing well

## 2019-02-28 ENCOUNTER — TELEPHONE (OUTPATIENT)
Dept: INTERNAL MEDICINE CLINIC | Facility: CLINIC | Age: 69
End: 2019-02-28

## 2019-02-28 NOTE — TELEPHONE ENCOUNTER
Pt's brother in law, Matthew Sarahi ([facility]) calling to verify what dosage the Pt should be taking, is it 5mg or 5/10 mg     •  AmLODIPine Besylate 5 MG Oral Tab, Take 1 tablet (5 mg total) by mouth daily. , Disp: 90 tablet, Rfl: 0

## 2019-03-01 ENCOUNTER — TELEPHONE (OUTPATIENT)
Dept: INTERNAL MEDICINE CLINIC | Facility: CLINIC | Age: 69
End: 2019-03-01

## 2019-03-01 NOTE — TELEPHONE ENCOUNTER
pt called wanting to know how many b/p medications he is on. I inform him 3. See b/p medication below. Do you agree.  Also pt stated that he wants you to referral him to a brain specialist because he had a stroke I believe he mean a neurologis

## 2019-03-01 NOTE — TELEPHONE ENCOUNTER
Pt called in want to know if she should be     taking 3, 2 or 1 high blood pills a day    Please advised [phone], cell can call anytime

## 2019-03-01 NOTE — TELEPHONE ENCOUNTER
Yes he should be on all of these bp meds as listed as I had told him on last ov. [id number] Shalini Cruz for him to see neurologist as he is requesting. He was seen by DR Kyaw Jeong at [address] [place] [address] when he had the stroke back in Aug 2018 so he can ffup with her.

## 2019-03-01 NOTE — TELEPHONE ENCOUNTER
Advised patient of Dr. Nolen Essex note. Patient verbalized understanding  Patient given contact number for neurology.

## 2019-03-03 ENCOUNTER — TELEPHONE (OUTPATIENT)
Dept: INTERNAL MEDICINE CLINIC | Facility: CLINIC | Age: 69
End: 2019-03-03

## 2019-03-04 ENCOUNTER — TELEPHONE (OUTPATIENT)
Dept: INTERNAL MEDICINE CLINIC | Facility: CLINIC | Age: 69
End: 2019-03-04

## 2019-03-04 NOTE — TELEPHONE ENCOUNTER
Pt's brother explained he helps pt with his medications. Asking if he can have a copy of pt's medications with dose instructions. Asking to  copy. Please advise, thank you.

## 2019-03-07 RX ORDER — LOSARTAN POTASSIUM 50 MG/1
TABLET ORAL
Qty: 90 TABLET | Refills: 0 | Status: ON HOLD | OUTPATIENT
Start: 2019-03-07 | End: 2019-04-19

## 2019-04-08 RX ORDER — METOPROLOL TARTRATE 50 MG/1
TABLET, FILM COATED ORAL
Qty: 180 TABLET | Refills: 0 | Status: SHIPPED | OUTPATIENT
Start: 2019-04-08 | End: 2019-07-05

## 2019-04-08 RX ORDER — ATORVASTATIN CALCIUM 20 MG/1
TABLET, FILM COATED ORAL
Qty: 90 TABLET | Refills: 0 | Status: SHIPPED | OUTPATIENT
Start: 2019-04-08 | End: 2019-06-27

## 2019-04-13 ENCOUNTER — HOSPITAL ENCOUNTER (INPATIENT)
Facility: HOSPITAL | Age: 69
LOS: 6 days | Discharge: NURSING, REHAB OR LONG-TERM CARE | DRG: 480 | End: 2019-04-19
Attending: EMERGENCY MEDICINE | Admitting: HOSPITALIST
Payer: MEDICARE

## 2019-04-13 ENCOUNTER — APPOINTMENT (OUTPATIENT)
Dept: GENERAL RADIOLOGY | Facility: HOSPITAL | Age: 69
DRG: 480 | End: 2019-04-13
Attending: EMERGENCY MEDICINE
Payer: MEDICARE

## 2019-04-13 DIAGNOSIS — S72.001D CLOSED FRACTURE OF RIGHT HIP WITH ROUTINE HEALING, SUBSEQUENT ENCOUNTER: ICD-10-CM

## 2019-04-13 DIAGNOSIS — R55 SYNCOPE, UNSPECIFIED SYNCOPE TYPE: ICD-10-CM

## 2019-04-13 DIAGNOSIS — S72.001A CLOSED FRACTURE OF RIGHT HIP, INITIAL ENCOUNTER (HCC): Primary | ICD-10-CM
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
## 2019-04-13 DIAGNOSIS — E78.00 PURE HYPERCHOLESTEROLEMIA: ICD-10-CM

## 2019-04-13 DIAGNOSIS — R73.03 PREDIABETES: ICD-10-CM

## 2019-04-13 DIAGNOSIS — S09.90XA INJURY OF HEAD, INITIAL ENCOUNTER: ICD-10-CM

## 2019-04-13 PROCEDURE — 73502 X-RAY EXAM HIP UNI 2-3 VIEWS: CPT | Performed by: EMERGENCY MEDICINE

## 2019-04-13 PROCEDURE — 71045 X-RAY EXAM CHEST 1 VIEW: CPT | Performed by: EMERGENCY MEDICINE

## 2019-04-13 RX ORDER — HYDROMORPHONE HYDROCHLORIDE 1 MG/ML
0.3 INJECTION, SOLUTION INTRAMUSCULAR; INTRAVENOUS; SUBCUTANEOUS EVERY 4 HOURS PRN
Status: DISCONTINUED | OUTPATIENT
Start: 2019-04-13 | End: 2019-04-19

## 2019-04-13 RX ORDER — HEPARIN SODIUM 5000 [USP'U]/ML
5000 INJECTION, SOLUTION INTRAVENOUS; SUBCUTANEOUS ONCE
Status: COMPLETED | OUTPATIENT
Start: 2019-04-13 | End: 2019-04-14

## 2019-04-13 RX ORDER — HYDROMORPHONE HYDROCHLORIDE 1 MG/ML
0.5 INJECTION, SOLUTION INTRAMUSCULAR; INTRAVENOUS; SUBCUTANEOUS EVERY 4 HOURS PRN
Status: DISCONTINUED | OUTPATIENT
Start: 2019-04-13 | End: 2019-04-19

## 2019-04-13 RX ORDER — SODIUM CHLORIDE 9 MG/ML
1000 INJECTION, SOLUTION INTRAVENOUS ONCE
Status: DISCONTINUED | OUTPATIENT
Start: 2019-04-13 | End: 2019-04-13

## 2019-04-13 RX ORDER — SODIUM CHLORIDE 0.9 % (FLUSH) 0.9 %
3 SYRINGE (ML) INJECTION AS NEEDED
Status: DISCONTINUED | OUTPATIENT
Start: 2019-04-13 | End: 2019-04-19

## 2019-04-13 RX ORDER — MORPHINE SULFATE 4 MG/ML
4 INJECTION, SOLUTION INTRAMUSCULAR; INTRAVENOUS ONCE
Status: COMPLETED | OUTPATIENT
Start: 2019-04-13 | End: 2019-04-13

## 2019-04-13 RX ORDER — SODIUM CHLORIDE 9 MG/ML
1000 INJECTION, SOLUTION INTRAVENOUS ONCE
Status: COMPLETED | OUTPATIENT
Start: 2019-04-13 | End: 2019-04-13

## 2019-04-14 ENCOUNTER — APPOINTMENT (OUTPATIENT)
Dept: ULTRASOUND IMAGING | Facility: HOSPITAL | Age: 69
DRG: 480 | End: 2019-04-14
Attending: Other
Payer: MEDICARE

## 2019-04-14 ENCOUNTER — APPOINTMENT (OUTPATIENT)
Dept: CT IMAGING | Facility: HOSPITAL | Age: 69
DRG: 480 | End: 2019-04-14
Attending: HOSPITALIST
Payer: MEDICARE

## 2019-04-14 ENCOUNTER — APPOINTMENT (OUTPATIENT)
Dept: ULTRASOUND IMAGING | Facility: HOSPITAL | Age: 69
DRG: 480 | End: 2019-04-14
Attending: HOSPITALIST
Payer: MEDICARE

## 2019-04-14 PROCEDURE — 99223 1ST HOSP IP/OBS HIGH 75: CPT | Performed by: OTHER

## 2019-04-14 PROCEDURE — 93970 EXTREMITY STUDY: CPT | Performed by: HOSPITALIST

## 2019-04-14 PROCEDURE — 93880 EXTRACRANIAL BILAT STUDY: CPT | Performed by: OTHER

## 2019-04-14 PROCEDURE — 99223 1ST HOSP IP/OBS HIGH 75: CPT | Performed by: HOSPITALIST

## 2019-04-14 RX ORDER — ASPIRIN 300 MG
300 SUPPOSITORY, RECTAL RECTAL DAILY
Status: DISCONTINUED | OUTPATIENT
Start: 2019-04-14 | End: 2019-04-17

## 2019-04-14 RX ORDER — IPRATROPIUM BROMIDE AND ALBUTEROL SULFATE 2.5; .5 MG/3ML; MG/3ML
3 SOLUTION RESPIRATORY (INHALATION)
Status: DISCONTINUED | OUTPATIENT
Start: 2019-04-14 | End: 2019-04-15

## 2019-04-14 RX ORDER — ACETAMINOPHEN 325 MG/1
650 TABLET ORAL EVERY 6 HOURS PRN
Status: DISCONTINUED | OUTPATIENT
Start: 2019-04-14 | End: 2019-04-17

## 2019-04-14 RX ORDER — FAMOTIDINE 20 MG/1
20 TABLET ORAL 2 TIMES DAILY
Status: DISCONTINUED | OUTPATIENT
Start: 2019-04-14 | End: 2019-04-19

## 2019-04-14 RX ORDER — FINASTERIDE 5 MG/1
5 TABLET, FILM COATED ORAL DAILY
Status: DISCONTINUED | OUTPATIENT
Start: 2019-04-14 | End: 2019-04-19

## 2019-04-14 RX ORDER — ATORVASTATIN CALCIUM 20 MG/1
20 TABLET, FILM COATED ORAL NIGHTLY
Status: DISCONTINUED | OUTPATIENT
Start: 2019-04-14 | End: 2019-04-19

## 2019-04-14 RX ORDER — HYDROCODONE BITARTRATE AND ACETAMINOPHEN 5; 325 MG/1; MG/1
1 TABLET ORAL EVERY 6 HOURS PRN
Status: DISCONTINUED | OUTPATIENT
Start: 2019-04-14 | End: 2019-04-19

## 2019-04-14 RX ORDER — ASPIRIN 325 MG
325 TABLET ORAL DAILY
Status: DISCONTINUED | OUTPATIENT
Start: 2019-04-14 | End: 2019-04-17

## 2019-04-14 RX ORDER — HEPARIN SODIUM 5000 [USP'U]/ML
5000 INJECTION, SOLUTION INTRAVENOUS; SUBCUTANEOUS EVERY 12 HOURS
Status: DISCONTINUED | OUTPATIENT
Start: 2019-04-14 | End: 2019-04-14

## 2019-04-14 RX ORDER — ALFUZOSIN HYDROCHLORIDE 10 MG/1
10 TABLET, EXTENDED RELEASE ORAL
Status: DISCONTINUED | OUTPATIENT
Start: 2019-04-14 | End: 2019-04-19

## 2019-04-14 RX ORDER — SENNOSIDES 8.6 MG
17.2 TABLET ORAL NIGHTLY
Status: DISCONTINUED | OUTPATIENT
Start: 2019-04-14 | End: 2019-04-17

## 2019-04-14 RX ORDER — HEPARIN SODIUM 5000 [USP'U]/ML
5000 INJECTION, SOLUTION INTRAVENOUS; SUBCUTANEOUS EVERY 12 HOURS SCHEDULED
Status: DISCONTINUED | OUTPATIENT
Start: 2019-04-14 | End: 2019-04-15

## 2019-04-14 RX ORDER — FUROSEMIDE 10 MG/ML
20 INJECTION INTRAMUSCULAR; INTRAVENOUS ONCE
Status: COMPLETED | OUTPATIENT
Start: 2019-04-14 | End: 2019-04-14

## 2019-04-14 NOTE — H&P
[facility]    PATIENT'S NAME: Alexus Serrano   ATTENDING PHYSICIAN: Maritna Josue MD   PATIENT ACCOUNT#:   [de-identified]    LOCATION:  [address] [place] [address],[place] [address] #:   [id number]       YOB: 1950  ADMISSION DATE: into bed, but because of persistent pain, he was brought to the emergency room for further evaluation. He was found to have a right intertrochanteric hip fracture.   His EKG revealed a sinus rhythm with a right bundle branch block and an old inferior infar not changed significantly following his CVA in August 2018. BPH. Hyperlipidemia. Osteoarthritis. Hypertension. PAST SURGICAL HISTORY:  Status post tonsillectomy. MEDICATIONS:  Prior to admission, amlodipine 5 mg p.o. daily; aspirin 325 mg p.o. respiratory rate 20, blood pressure 148/76, O2 saturation 96% on room air. HEENT:  Normocephalic. No obvious trauma noted. Pupils equal and reactive. Sclerae anicteric. There was no sinus tenderness. Mucous membranes were dry. NECK:  Supple.   LUNGS: displaced rib fractures. ASSESSMENT AND PLAN:    1. Intertrochanteric hip fracture.   Will need ortho attention; however, given the patient's marked lower extremity edema with cellulitic changes in the other extremity and marked skin and nail changes members prior to discharge. The patient will likely go from here to a rehab facility but may not be able to go home after that without further assistance. 8.   DVT prophylaxis. Subcutaneous heparin.   9.   The patient's current clinical status and propos

## 2019-04-14 NOTE — CONSULTS
[facility]    Report of Consultation    Deana Manley Patient Status:  Inpatient    1950 MRN [id number]   Location [facility] 4W/SW/SE Attending Rich Salazar MD   Hosp Day # 1 PCP Kenith Landau, MD     Date of Ad Sister    • Other (Scoliosis) Brother    • Diabetes Paternal Grandfather        Social History  Patient Guardian Status:  Not on file    Other Topics            Concern  Caffeine Concern        Yes    Comment:tea, 2 cups per day   Exercise                Y total) by mouth daily. AmLODIPine Besylate 5 MG Oral Tab Take 1 tablet (5 mg total) by mouth daily. docusate sodium 100 MG Oral Cap Take 100 mg by mouth 2 (two) times daily as needed for constipation.    DICLOFENAC OR Take 50 mg by mouth 3 (three) times to tested, left leg however was 4 out of 5    Sensory Exam:  Light touch sensation- intact in all 4 extremities    Deep Tendon Reflexes:  Biceps 2+ bilateral symmetric  Triceps 2+ bilateral symmetric  Brachioradialis 2 + bilateral symmetric  Patellar 1 estelita 4/14/2019  CONCLUSION:  1. Intertrochanteric fracture right hip. 2. Demineralization. 3. Scoliosis and osteoarthritis lower lumbar spine. 4. A preliminary report was submitted and there is agreement without major discrepancies. Dictated by (CST):  Pepper Barrera

## 2019-04-14 NOTE — PROGRESS NOTES
ADMISSION NOTE    77year old male with h/o TIA,  HTN HL presents with intertrochanteric hip fracture after a fall. Details of the fall are vague. Pt states he was standing up resting on a tall speaker and fell asleep.    Woke up as he was falling back o

## 2019-04-14 NOTE — ED PROVIDER NOTES
Patient Seen in: [facility] AND [facility] Emergency Department    History   Patient presents with:  Fall    Stated Complaint: fall    HPI    71-year-old male with history of hypertension, hyperlipidemia, osteoarthritis, here after a fall.   Patient states that h HPI.  Constitutional and vital signs reviewed. All other systems reviewed and negative except as noted above.     Physical Exam     ED Triage Vitals [04/13/19 2032]   BP (!) 155/93   Pulse 81   Resp 18   Temp 98 °F (36.7 °C)   Temp src Oral   SpO2 96 % indicating adequate oxygenation.     PROCEDURES:  none    DIAGNOSTICS:   Labs:  Recent Results (from the past 24 hour(s))   RAINBOW DRAW BLUE    Collection Time: 04/13/19  8:53 PM   Result Value Ref [place]    Hold Blue Auto Resulted    RAINBOW DRAW LAVENDER Lymphocyte % 6.3 %    Monocyte % 7.6 %    Eosinophil % 0.6 %    Basophil % 0.2 %    Immature Granulocyte % 0.4 %   ABORH (BLOOD TYPE)    Collection Time: 04/13/19  8:53 PM   Result Value Ref [place]    ABO BLOOD TYPE A     RH BLOOD TYPE Positive    ANTIBODY head injury, arrhythmia.       Disposition and Plan     Clinical Impression:  Closed fracture of right hip, initial encounter [facility])  (primary encounter diagnosis)  Fall, initial encounter  Injury of head, initial encounter    Disposition:  Admit  4/13/2019

## 2019-04-14 NOTE — CONSULTS
4/13/2019  Khushboo Urrutia  32/1950  77year old   male    I was asked in consultation by Dr. Duran Davey to see and evaluate Todd Brandon in consultation regarding his right hip pain.     HPI:   Patient presents with:  Fall      Date of injury/ onset of sy HISTORY:  Past Medical History:   Diagnosis Date   • Acute, but ill-defined, cerebrovascular disease    • BPH (benign prostatic hyperplasia)    • Hyperlipidemia    • Osteoarthritis    • Scoliosis    • Unspecified essential hypertension       Past Surgica erythema. He has negative Marito testing. The patient has tenderness to palpation at the right hip with associated edema and ecchymosis. There is evidence of clinical deformity with shortening and external rotation. He has limited motion.       IMAGING AN

## 2019-04-14 NOTE — SLP NOTE
ADULT SWALLOWING EVALUATION    ASSESSMENT    ASSESSMENT/OVERALL IMPRESSION:  Orders received, chart reviewed. Pt known to SLP department with hx of stroke in August 2018.  SLP recommended mechanical soft consistency and thin liquids, no straws, in August of position; Slow rate;Small bites and sips  Medication Administration Recommendations: No restrictions  Treatment Plan/Recommendations: Dysphagia therapy  Discharge Recommendations/Plan: Undetermined    HISTORY   MEDICAL HISTORY  Reason for Referral: Stroke p impaired  (Please note: Silent aspiration cannot be evaluated clinically.  Videofluoroscopic Swallow Study is required to rule-out silent aspiration.)    Esophageal Phase of Swallow: No complaints consistent with possible esophageal involvement    GOALS  Go

## 2019-04-14 NOTE — CM/SW NOTE
SW received an MDO regarding advance directives and discharge planning. Pt was out of the room for testing, therefore SW met with the pt's Joey QUISPE. Pt lives on the main level of a townhouse with 1 stair to enter.  Pt lives alone but has several family me

## 2019-04-14 NOTE — PLAN OF CARE
Problem: Patient/Family Goals  Goal: Patient/Family Long Term Goal  Description  Patient's Long Term Goal: \"I want to go home already\"    Interventions:  - Consults  -PT,OT  -medication management  - See additional Care Plan goals for specific interven ADULT  Goal: Absence of fever/infection during anticipated neutropenic period  Description  INTERVENTIONS  - Monitor WBC  - Administer growth factors as ordered  - Implement neutropenic guidelines  Outcome: Progressing     Problem: [facility] as needed  - Follow urinary retention protocol/standard of care  - Consider collaborating with pharmacy to review patient's medication profile  - Implement strategies to promote bladder emptying  Outcome: Progressing     Arthur in place    Problem: SKIN/TIS assistive device and precautions (e.g. spinal or hip dislocation precautions)  Outcome: Progressing     5lb bone traction in place per MD order.     NPO, lorenzo in place, ortho to consult patient in am. Telelmetry in place, MD ordered brain CT due to vague s

## 2019-04-14 NOTE — PROGRESS NOTES
[facility]    Progress Note    Natalia Pagan Patient Status:  Inpatient    1950 MRN [id number]   Location [facility] 4W/SW/SE Attending Audra Mancuso MD   Hosp Day # 1 PCP Kade Che MD     [address] [place] [address]

## 2019-04-14 NOTE — PLAN OF CARE
Problem: Patient/Family Goals  Goal: Patient/Family Long Term Goal  Description  Patient's Long Term Goal: be independent    Interventions:  - progress in activity as ordered after surgery  - PT/OT   - See additional Care Plan goals for specific interven anticipated neutropenic period  Description  INTERVENTIONS  - Monitor WBC  - Administer growth factors as ordered  - Implement neutropenic guidelines  Outcome: Progressing     Problem: SAFETY ADULT - FALL  Goal: Free from fall injury  Description  INTERVEN protocol/standard of care  - Consider collaborating with pharmacy to review patient's medication profile  - Implement strategies to promote bladder emptying  Outcome: Progressing     Problem: SKIN/TISSUE INTEGRITY - ADULT  Goal: Skin integrity remains Guinea Rle. R leg shortened and externally rotated. Heel boots and mepilex to sacrum in place prophylactically. Edema to estelita le and estelita feet. Lasix given earlier today. Doppler used to assess pedal pulses.  Pt denies any n/t but reports decreased sensation and lilly

## 2019-04-14 NOTE — PROGRESS NOTES
[facility] Dosing Service    Initial Pharmacokinetic Consult for Vancomycin Dosing     Tatianna Perla is a 77year old male who is being treated for cellulitis.   Pharmacy has been asked to dose Vancomycin by Dr. Marin Cons    He is allergic to ace inhibi

## 2019-04-14 NOTE — PHYSICAL THERAPY NOTE
Patient placed on hold for fracture and impending surgery. Will need new orders and weight bearing status when appropriate.

## 2019-04-14 NOTE — CONSULTS
[facility] - [facility]    Report of Cardiology Consultation    Chito Buckley Patient Status:  Inpatient    1950 MRN [id number]   Location [facility] 4W/SW/SE Attending Sophie Morton MD   Hosp Day # 1 PCP Dhiraj Landaverde MD it, leaning on it. He says, \"I must have fallen asleep and woke up as I was falling to the floor. \"  He reports that he was able to brace his fall somewhat. He did land on his right hip and had significant pain at the site.   He was able to drag himself Alzheimer's Disease   • Other (Scoliosis) Sister    • Other (Scoliosis) Brother    • Diabetes Paternal Grandfather        Social History  Patient Guardian Status:  Not on file    Other Topics            Concern  Caffeine Concern        Yes    Commen LOSARTAN POTASSIUM 50 MG Oral Tab TAKE 1 TABLET(50 MG) BY MOUTH DAILY   FAMOTIDINE 20 MG Oral Tab TAKE 1 TABLET BY MOUTH TWICE DAILY FOR GERD   finasteride 5 MG Oral Tab Take 1 tablet (5 mg total) by mouth daily.    AmLODIPine Besylate 5 MG Oral Tab Take Results:     Laboratory Data:  Lab Results   Component Value Date    WBC 7.6 04/14/2019    HGB 11.5 (L) 04/14/2019    HCT 35.4 (L) 04/14/2019    .0 04/14/2019    CREATSERUM 1.16 04/14/2019    BUN 29 (H) 04/14/2019     04/14/2019    K 4.3 0

## 2019-04-14 NOTE — ED INITIAL ASSESSMENT (HPI)
Fall from standing when \"dozing off\" while leaning on a \"Tall speaker\". Has pain to right hip/thigh. No obvious deformity.

## 2019-04-15 ENCOUNTER — APPOINTMENT (OUTPATIENT)
Dept: CV DIAGNOSTICS | Facility: HOSPITAL | Age: 69
DRG: 480 | End: 2019-04-15
Attending: Other
Payer: MEDICARE

## 2019-04-15 ENCOUNTER — APPOINTMENT (OUTPATIENT)
Dept: CV DIAGNOSTICS | Facility: HOSPITAL | Age: 69
DRG: 480 | End: 2019-04-15
Attending: INTERNAL MEDICINE
Payer: MEDICARE

## 2019-04-15 ENCOUNTER — APPOINTMENT (OUTPATIENT)
Dept: CT IMAGING | Facility: HOSPITAL | Age: 69
DRG: 480 | End: 2019-04-15
Attending: HOSPITALIST
Payer: MEDICARE

## 2019-04-15 PROCEDURE — 99232 SBSQ HOSP IP/OBS MODERATE 35: CPT | Performed by: OTHER

## 2019-04-15 PROCEDURE — 93306 TTE W/DOPPLER COMPLETE: CPT | Performed by: OTHER

## 2019-04-15 PROCEDURE — 99232 SBSQ HOSP IP/OBS MODERATE 35: CPT | Performed by: HOSPITALIST

## 2019-04-15 PROCEDURE — 70450 CT HEAD/BRAIN W/O DYE: CPT | Performed by: HOSPITALIST

## 2019-04-15 RX ORDER — CEFAZOLIN SODIUM/WATER 2 G/20 ML
2 SYRINGE (ML) INTRAVENOUS ONCE
Status: COMPLETED | OUTPATIENT
Start: 2019-04-16 | End: 2019-04-16

## 2019-04-15 RX ORDER — CEFAZOLIN SODIUM/WATER 2 G/20 ML
2 SYRINGE (ML) INTRAVENOUS
Status: DISCONTINUED | OUTPATIENT
Start: 2019-04-15 | End: 2019-04-15

## 2019-04-15 RX ORDER — METOCLOPRAMIDE 10 MG/1
10 TABLET ORAL ONCE
Status: DISCONTINUED | OUTPATIENT
Start: 2019-04-15 | End: 2019-04-15

## 2019-04-15 RX ORDER — SODIUM CHLORIDE 9 MG/ML
INJECTION, SOLUTION INTRAVENOUS
Status: DISPENSED
Start: 2019-04-15 | End: 2019-04-15

## 2019-04-15 RX ORDER — SODIUM CHLORIDE 9 MG/ML
INJECTION, SOLUTION INTRAVENOUS CONTINUOUS
Status: DISCONTINUED | OUTPATIENT
Start: 2019-04-15 | End: 2019-04-15

## 2019-04-15 RX ORDER — IPRATROPIUM BROMIDE AND ALBUTEROL SULFATE 2.5; .5 MG/3ML; MG/3ML
3 SOLUTION RESPIRATORY (INHALATION) EVERY 6 HOURS PRN
Status: DISCONTINUED | OUTPATIENT
Start: 2019-04-15 | End: 2019-04-17

## 2019-04-15 RX ORDER — IPRATROPIUM BROMIDE AND ALBUTEROL SULFATE 2.5; .5 MG/3ML; MG/3ML
3 SOLUTION RESPIRATORY (INHALATION)
Status: DISCONTINUED | OUTPATIENT
Start: 2019-04-15 | End: 2019-04-18

## 2019-04-15 RX ORDER — METOCLOPRAMIDE 10 MG/1
10 TABLET ORAL ONCE
Status: COMPLETED | OUTPATIENT
Start: 2019-04-16 | End: 2019-04-16

## 2019-04-15 NOTE — PROGRESS NOTES
[facility]    Progress Note    Yue Sayvanessa Patient Status:  Inpatient    1950 MRN [id number]   Location [facility] 4W/SW/SE Attending Shayla Schwartz MD   Hosp Day # 2 PCP Carolyn Rush MD       Subjective:   Ginette Goddard • ipratropium-albuterol  3 mL Nebulization 6 times per day   • atorvastatin  20 mg Oral Nightly   • famoTIDine  20 mg Oral BID   • finasteride  5 mg Oral Daily   • Alfuzosin HCl ER  10 mg Oral Daily with breakfast   • Metoprolol Tartrate  25 mg Oral 2x D Lissette Diamond MD on 4/14/2019 at 9:47          Xr Chest Ap Portable  (cpt=71045)    Result Date: 4/14/2019  CONCLUSION:  1. Little change from August 27, 2018. 2. Cardiomegaly. 3. Tortuous aorta. 4. Scarring/atelectasis. 5. Elevated left hemidiaphragm. status: Full.          Jasiel Long MD  Hospitalist

## 2019-04-15 NOTE — PLAN OF CARE
Problem: Patient/Family Goals  Goal: Patient/Family Long Term Goal  Description  Patient's Long Term Goal: \"to go home and drive my car\"    Interventions:  - SW consult  -pain management  -plan for surgery, PT/OT  - See additional Care Plan goals for s fever/infection during anticipated neutropenic period  Description  INTERVENTIONS  - Monitor WBC  - Administer growth factors as ordered  - Implement neutropenic guidelines  Outcome: Progressing     Problem: SAFETY ADULT - FALL  Goal: Free from fall injury urinary retention protocol/standard of care  - Consider collaborating with pharmacy to review patient's medication profile  - Implement strategies to promote bladder emptying  Outcome: Progressing     Problem: SKIN/TISSUE INTEGRITY - ADULT  Goal: Skin inte pending neuro consult. Plan for Monday: 2d echo, head CT and EEG. Cards signed off. Arthur in place and on bedrest. Cardiac diet with fluid restriction. Norco and dilaudid for pain. Telemetry, 2 liters o2.  Call light within reach and calls appropriately, al

## 2019-04-15 NOTE — DIETARY NOTE
Brief Nutrition Note:    Seeing pt due to RN screened at risk at admission due to pressure wounds. Pt eating well. Stable wt history. Wounds are not on pressure sites, therefore not at nutritional risk. Pt having surgery tomorrow for right hip fracture.  Candice Freedman

## 2019-04-15 NOTE — PROGRESS NOTES
I attempted to see the patient this am but he was in cardio diagnostics.  Will return later or tomorrow am.

## 2019-04-15 NOTE — PLAN OF CARE
Problem: Patient/Family Goals  Goal: Patient/Family Long Term Goal  Description  Patient's Long Term Goal: improved overall function    Interventions: planned surgical repair of right hip fracture.  Planned rehab  -   - See additional Care Plan goals for Progressing     Problem: RISK FOR INFECTION - ADULT  Goal: Absence of fever/infection during anticipated neutropenic period  Description  INTERVENTIONS  - Monitor WBC  - Administer growth factors as ordered  - Implement neutropenic guidelines  Outcome: Pro Monitor intake/output and perform bladder scan as needed  - Follow urinary retention protocol/standard of care  - Consider collaborating with pharmacy to review patient's medication profile  - Implement strategies to promote bladder emptying  Outcome: Prog precautions)  Outcome: Progressing   Patient had the following tests today: 2D echo of heart, EEG, CT of head.  Patient was seen today by wound nurse for leg wounds, dietitian, speech pathologist, Dr. Dora Scott, Neurology-Dr. Angela Payan Dr.

## 2019-04-15 NOTE — PROGRESS NOTES
4/13/2019  Natalia Pagan  32/1950  77year old   male      HPI:   Patient presents with:  Fall      The patient complains of pain located right hip. The pain is the same but controlled with medication and Finn's traction.   The patient denies numbness a patient desired surgery. Surgery recommended was right hip closed reduction and intramedullary nail fixation.   Risks include bleeding, infection, neurovascular injury, failure of the procedure, stiffness, and potential need for additional surgery as well

## 2019-04-15 NOTE — SLP NOTE
SPEECH DAILY NOTE - INPATIENT    ASSESSMENT & PLAN   ASSESSMENT  Patient seen to monitor tolerance of PO diet and train compensatory strategies. Observed patient while sitting upright in bed with trials of current diet.   No clinical signs of dysphagia inc 2    Session: 1    If you have any questions, please contact [place] [facility] MA/CCC-SLP  Speech Language Pathologist  [place]. [phone]

## 2019-04-15 NOTE — PROGRESS NOTES
Neurology Inpatient Follow-up Note      HPI:     Patient being seen in follow-up. Brother-in-law at bedside. Patient is a somewhat vague historian. Interval notes and workup reviewed.   Patient unable to give me further details of his initial fall, excep of the right globe. Please note that this may preclude follow-up MR assessment.       LDL 58    ASSESSMENT/PLAN:   Ataxia, fall  History of right medullary stroke  One possibility is chronic sequela of prior medullary stroke, compounded by noncompliance wi

## 2019-04-15 NOTE — PROGRESS NOTES
[facility]    Cardiology Progress Note    Elsworth Witten Patient Status:  Inpatient    1950 MRN [id number]   Location [facility] 4W/SW/SE Attending Lei Hilton MD   Hosp Day # 2 PCP MD Heidy Ruiz proceed with ortho surgery, no further cardiac w/u or rx indicated  2) Fall                ?syncope                Poor historian.  But no change in ekg, cardiac status or sx to warrant further w/u  3) cva hx 9/18  4) htn  5) tox screen                Opiat Osteoarthritis. 9. A preliminary report was submitted and there are no major discrepancies. Dictated by (CST): Sun Valladares MD on 4/14/2019 at 7:14     Approved by (CST):  Sun Valladares MD on 4/14/2019 at 7:17          Xr Hip W Or Wo Pelvis 2 Or

## 2019-04-16 ENCOUNTER — ANESTHESIA (OUTPATIENT)
Dept: SURGERY | Facility: HOSPITAL | Age: 69
DRG: 480 | End: 2019-04-16
Payer: MEDICARE

## 2019-04-16 ENCOUNTER — APPOINTMENT (OUTPATIENT)
Dept: GENERAL RADIOLOGY | Facility: HOSPITAL | Age: 69
DRG: 480 | End: 2019-04-16
Attending: ORTHOPAEDIC SURGERY
Payer: MEDICARE

## 2019-04-16 ENCOUNTER — ANESTHESIA EVENT (OUTPATIENT)
Dept: SURGERY | Facility: HOSPITAL | Age: 69
DRG: 480 | End: 2019-04-16
Payer: MEDICARE

## 2019-04-16 PROCEDURE — 99232 SBSQ HOSP IP/OBS MODERATE 35: CPT | Performed by: OTHER

## 2019-04-16 PROCEDURE — 0QS636Z REPOSITION RIGHT UPPER FEMUR WITH INTRAMEDULLARY INTERNAL FIXATION DEVICE, PERCUTANEOUS APPROACH: ICD-10-PCS | Performed by: ORTHOPAEDIC SURGERY

## 2019-04-16 PROCEDURE — 76000 FLUOROSCOPY <1 HR PHYS/QHP: CPT | Performed by: ORTHOPAEDIC SURGERY

## 2019-04-16 PROCEDURE — 99233 SBSQ HOSP IP/OBS HIGH 50: CPT | Performed by: HOSPITALIST

## 2019-04-16 PROCEDURE — 95816 EEG AWAKE AND DROWSY: CPT | Performed by: OTHER

## 2019-04-16 PROCEDURE — 73501 X-RAY EXAM HIP UNI 1 VIEW: CPT | Performed by: ORTHOPAEDIC SURGERY

## 2019-04-16 PROCEDURE — 99221 1ST HOSP IP/OBS SF/LOW 40: CPT | Performed by: PODIATRIST

## 2019-04-16 DEVICE — SCREW BN 5MM 4.3MM 34MM NLEX: Type: IMPLANTABLE DEVICE | Status: FUNCTIONAL

## 2019-04-16 RX ORDER — HYDROMORPHONE HYDROCHLORIDE 1 MG/ML
0.2 INJECTION, SOLUTION INTRAMUSCULAR; INTRAVENOUS; SUBCUTANEOUS EVERY 2 HOUR PRN
Status: DISCONTINUED | OUTPATIENT
Start: 2019-04-16 | End: 2019-04-19

## 2019-04-16 RX ORDER — HYDROCODONE BITARTRATE AND ACETAMINOPHEN 5; 325 MG/1; MG/1
1 TABLET ORAL EVERY 4 HOURS PRN
Status: DISCONTINUED | OUTPATIENT
Start: 2019-04-16 | End: 2019-04-19

## 2019-04-16 RX ORDER — SODIUM CHLORIDE 9 MG/ML
INJECTION, SOLUTION INTRAVENOUS CONTINUOUS
Status: DISCONTINUED | OUTPATIENT
Start: 2019-04-16 | End: 2019-04-17

## 2019-04-16 RX ORDER — HYDROMORPHONE HYDROCHLORIDE 1 MG/ML
0.4 INJECTION, SOLUTION INTRAMUSCULAR; INTRAVENOUS; SUBCUTANEOUS
Status: ACTIVE | OUTPATIENT
Start: 2019-04-16 | End: 2019-04-17

## 2019-04-16 RX ORDER — HYDROMORPHONE HYDROCHLORIDE 1 MG/ML
0.4 INJECTION, SOLUTION INTRAMUSCULAR; INTRAVENOUS; SUBCUTANEOUS EVERY 2 HOUR PRN
Status: DISCONTINUED | OUTPATIENT
Start: 2019-04-16 | End: 2019-04-19

## 2019-04-16 RX ORDER — HALOPERIDOL 5 MG/ML
0.5 INJECTION INTRAMUSCULAR ONCE AS NEEDED
Status: ACTIVE | OUTPATIENT
Start: 2019-04-16 | End: 2019-04-16

## 2019-04-16 RX ORDER — POLYETHYLENE GLYCOL 3350 17 G/17G
17 POWDER, FOR SOLUTION ORAL DAILY PRN
Status: DISCONTINUED | OUTPATIENT
Start: 2019-04-16 | End: 2019-04-19

## 2019-04-16 RX ORDER — SENNOSIDES 8.6 MG
17.2 TABLET ORAL NIGHTLY
Status: DISCONTINUED | OUTPATIENT
Start: 2019-04-16 | End: 2019-04-19

## 2019-04-16 RX ORDER — NALOXONE HYDROCHLORIDE 0.4 MG/ML
0.08 INJECTION, SOLUTION INTRAMUSCULAR; INTRAVENOUS; SUBCUTANEOUS
Status: ACTIVE | OUTPATIENT
Start: 2019-04-16 | End: 2019-04-17

## 2019-04-16 RX ORDER — BUPIVACAINE HYDROCHLORIDE 7.5 MG/ML
INJECTION, SOLUTION EPIDURAL; RETROBULBAR AS NEEDED
Status: DISCONTINUED | OUTPATIENT
Start: 2019-04-16 | End: 2019-04-16 | Stop reason: SURG

## 2019-04-16 RX ORDER — LIDOCAINE HYDROCHLORIDE 10 MG/ML
INJECTION, SOLUTION EPIDURAL; INFILTRATION; INTRACAUDAL; PERINEURAL AS NEEDED
Status: DISCONTINUED | OUTPATIENT
Start: 2019-04-16 | End: 2019-04-16 | Stop reason: SURG

## 2019-04-16 RX ORDER — MIDAZOLAM HYDROCHLORIDE 1 MG/ML
INJECTION INTRAMUSCULAR; INTRAVENOUS AS NEEDED
Status: DISCONTINUED | OUTPATIENT
Start: 2019-04-16 | End: 2019-04-16 | Stop reason: SURG

## 2019-04-16 RX ORDER — BISACODYL 10 MG
10 SUPPOSITORY, RECTAL RECTAL
Status: DISCONTINUED | OUTPATIENT
Start: 2019-04-16 | End: 2019-04-19

## 2019-04-16 RX ORDER — DIPHENHYDRAMINE HYDROCHLORIDE 50 MG/ML
12.5 INJECTION INTRAMUSCULAR; INTRAVENOUS EVERY 4 HOURS PRN
Status: ACTIVE | OUTPATIENT
Start: 2019-04-16 | End: 2019-04-17

## 2019-04-16 RX ORDER — DOCUSATE SODIUM 100 MG/1
100 CAPSULE, LIQUID FILLED ORAL 2 TIMES DAILY
Status: DISCONTINUED | OUTPATIENT
Start: 2019-04-16 | End: 2019-04-19

## 2019-04-16 RX ORDER — ONDANSETRON 2 MG/ML
4 INJECTION INTRAMUSCULAR; INTRAVENOUS ONCE AS NEEDED
Status: ACTIVE | OUTPATIENT
Start: 2019-04-16 | End: 2019-04-16

## 2019-04-16 RX ORDER — DIAPER,BRIEF,INFANT-TODD,DISP
EACH MISCELLANEOUS AS NEEDED
Status: DISCONTINUED | OUTPATIENT
Start: 2019-04-16 | End: 2019-04-16 | Stop reason: HOSPADM

## 2019-04-16 RX ORDER — SODIUM CHLORIDE, SODIUM LACTATE, POTASSIUM CHLORIDE, CALCIUM CHLORIDE 600; 310; 30; 20 MG/100ML; MG/100ML; MG/100ML; MG/100ML
INJECTION, SOLUTION INTRAVENOUS CONTINUOUS
Status: DISCONTINUED | OUTPATIENT
Start: 2019-04-16 | End: 2019-04-17

## 2019-04-16 RX ORDER — CEFAZOLIN SODIUM/WATER 2 G/20 ML
2 SYRINGE (ML) INTRAVENOUS EVERY 8 HOURS
Status: COMPLETED | OUTPATIENT
Start: 2019-04-17 | End: 2019-04-17

## 2019-04-16 RX ORDER — SODIUM PHOSPHATE, DIBASIC AND SODIUM PHOSPHATE, MONOBASIC 7; 19 G/133ML; G/133ML
1 ENEMA RECTAL ONCE AS NEEDED
Status: DISCONTINUED | OUTPATIENT
Start: 2019-04-16 | End: 2019-04-19

## 2019-04-16 RX ORDER — ENOXAPARIN SODIUM 100 MG/ML
40 INJECTION SUBCUTANEOUS DAILY
Status: DISCONTINUED | OUTPATIENT
Start: 2019-04-17 | End: 2019-04-19

## 2019-04-16 RX ORDER — HYDROMORPHONE HYDROCHLORIDE 1 MG/ML
0.6 INJECTION, SOLUTION INTRAMUSCULAR; INTRAVENOUS; SUBCUTANEOUS
Status: ACTIVE | OUTPATIENT
Start: 2019-04-16 | End: 2019-04-17

## 2019-04-16 RX ORDER — MORPHINE SULFATE 4 MG/ML
2 INJECTION, SOLUTION INTRAMUSCULAR; INTRAVENOUS EVERY 10 MIN PRN
Status: DISCONTINUED | OUTPATIENT
Start: 2019-04-16 | End: 2019-04-16 | Stop reason: HOSPADM

## 2019-04-16 RX ORDER — ACETAMINOPHEN 325 MG/1
650 TABLET ORAL EVERY 6 HOURS PRN
Status: DISCONTINUED | OUTPATIENT
Start: 2019-04-16 | End: 2019-04-19

## 2019-04-16 RX ORDER — NALBUPHINE HCL 10 MG/ML
2.5 AMPUL (ML) INJECTION EVERY 4 HOURS PRN
Status: DISCONTINUED | OUTPATIENT
Start: 2019-04-16 | End: 2019-04-19

## 2019-04-16 RX ORDER — HYDROMORPHONE HYDROCHLORIDE 1 MG/ML
0.8 INJECTION, SOLUTION INTRAMUSCULAR; INTRAVENOUS; SUBCUTANEOUS EVERY 2 HOUR PRN
Status: DISCONTINUED | OUTPATIENT
Start: 2019-04-16 | End: 2019-04-19

## 2019-04-16 RX ORDER — DIPHENHYDRAMINE HCL 25 MG
25 CAPSULE ORAL EVERY 4 HOURS PRN
Status: ACTIVE | OUTPATIENT
Start: 2019-04-16 | End: 2019-04-17

## 2019-04-16 RX ORDER — HYDROCODONE BITARTRATE AND ACETAMINOPHEN 7.5; 325 MG/1; MG/1
1 TABLET ORAL EVERY 6 HOURS PRN
Status: DISCONTINUED | OUTPATIENT
Start: 2019-04-16 | End: 2019-04-19

## 2019-04-16 RX ORDER — SODIUM CHLORIDE 0.9 % (FLUSH) 0.9 %
10 SYRINGE (ML) INJECTION AS NEEDED
Status: DISCONTINUED | OUTPATIENT
Start: 2019-04-16 | End: 2019-04-19

## 2019-04-16 RX ORDER — HYDROCODONE BITARTRATE AND ACETAMINOPHEN 7.5; 325 MG/1; MG/1
2 TABLET ORAL EVERY 6 HOURS PRN
Status: DISCONTINUED | OUTPATIENT
Start: 2019-04-16 | End: 2019-04-19

## 2019-04-16 RX ORDER — CEFAZOLIN SODIUM/WATER 2 G/20 ML
2 SYRINGE (ML) INTRAVENOUS
Status: DISPENSED | OUTPATIENT
Start: 2019-04-16 | End: 2019-04-17

## 2019-04-16 RX ORDER — MORPHINE SULFATE 1 MG/ML
INJECTION, SOLUTION EPIDURAL; INTRATHECAL; INTRAVENOUS AS NEEDED
Status: DISCONTINUED | OUTPATIENT
Start: 2019-04-16 | End: 2019-04-16 | Stop reason: SURG

## 2019-04-16 RX ORDER — METOCLOPRAMIDE HYDROCHLORIDE 5 MG/ML
10 INJECTION INTRAMUSCULAR; INTRAVENOUS EVERY 6 HOURS PRN
Status: DISCONTINUED | OUTPATIENT
Start: 2019-04-16 | End: 2019-04-19

## 2019-04-16 RX ORDER — ONDANSETRON 2 MG/ML
INJECTION INTRAMUSCULAR; INTRAVENOUS AS NEEDED
Status: DISCONTINUED | OUTPATIENT
Start: 2019-04-16 | End: 2019-04-16 | Stop reason: SURG

## 2019-04-16 RX ORDER — DEXAMETHASONE SODIUM PHOSPHATE 4 MG/ML
VIAL (ML) INJECTION AS NEEDED
Status: DISCONTINUED | OUTPATIENT
Start: 2019-04-16 | End: 2019-04-16 | Stop reason: SURG

## 2019-04-16 RX ORDER — METOPROLOL TARTRATE 50 MG/1
50 TABLET, FILM COATED ORAL
Status: DISCONTINUED | OUTPATIENT
Start: 2019-04-16 | End: 2019-04-17

## 2019-04-16 RX ORDER — ACETAMINOPHEN 325 MG/1
650 TABLET ORAL EVERY 4 HOURS PRN
Status: DISCONTINUED | OUTPATIENT
Start: 2019-04-16 | End: 2019-04-19

## 2019-04-16 RX ORDER — HYDROCODONE BITARTRATE AND ACETAMINOPHEN 5; 325 MG/1; MG/1
2 TABLET ORAL EVERY 4 HOURS PRN
Status: DISCONTINUED | OUTPATIENT
Start: 2019-04-16 | End: 2019-04-19

## 2019-04-16 RX ADMIN — MIDAZOLAM HYDROCHLORIDE 2 MG: 1 INJECTION INTRAMUSCULAR; INTRAVENOUS at 18:08:00

## 2019-04-16 RX ADMIN — DEXAMETHASONE SODIUM PHOSPHATE 4 MG: 4 MG/ML VIAL (ML) INJECTION at 19:01:00

## 2019-04-16 RX ADMIN — LIDOCAINE HYDROCHLORIDE 50 MG: 10 INJECTION, SOLUTION EPIDURAL; INFILTRATION; INTRACAUDAL; PERINEURAL at 18:23:00

## 2019-04-16 RX ADMIN — ONDANSETRON 4 MG: 2 INJECTION INTRAMUSCULAR; INTRAVENOUS at 19:01:00

## 2019-04-16 RX ADMIN — SODIUM CHLORIDE: 9 INJECTION, SOLUTION INTRAVENOUS at 18:01:00

## 2019-04-16 RX ADMIN — SODIUM CHLORIDE: 9 INJECTION, SOLUTION INTRAVENOUS at 18:02:00

## 2019-04-16 RX ADMIN — MORPHINE SULFATE 0.2 MG: 1 INJECTION, SOLUTION EPIDURAL; INTRATHECAL; INTRAVENOUS at 18:25:00

## 2019-04-16 RX ADMIN — CEFAZOLIN SODIUM/WATER 2 G: 2 G/20 ML SYRINGE (ML) INTRAVENOUS at 18:29:00

## 2019-04-16 RX ADMIN — SODIUM CHLORIDE: 9 INJECTION, SOLUTION INTRAVENOUS at 19:47:00

## 2019-04-16 RX ADMIN — BUPIVACAINE HYDROCHLORIDE 1.4 ML: 7.5 INJECTION, SOLUTION EPIDURAL; RETROBULBAR at 18:25:00

## 2019-04-16 NOTE — PLAN OF CARE
Problem: PAIN - ADULT  Goal: Verbalizes/displays adequate comfort level or patient's stated pain goal  Description  INTERVENTIONS:  - Encourage pt to monitor pain and request assistance  - Assess pain using appropriate pain scale  - Administer analgesics resources  Description  INTERVENTIONS:  - Identify barriers to discharge w/pt and caregiver  - Include patient/family/discharge partner in discharge planning  - Arrange for needed discharge resources and transportation as appropriate  - Identify discharge orders  - Initiate isolation precautions as appropriate  - Initiate Pressure Ulcer prevention bundle as indicated  Outcome: Progressing  Note:   Wounds on L foot and L lower leg, dressing changed today.       Problem: MUSCULOSKELETAL - ADULT  Goal: Return m

## 2019-04-16 NOTE — ANESTHESIA PREPROCEDURE EVALUATION
Anesthesia PreOp Note    HPI:     Khushboo Urrutia is a 77year old male who presents for preoperative consultation requested by: Tommie Lynn MD    Date of Surgery: 4/13/2019 - 4/16/2019    Procedure(s):  HIP PINNING  Indication: Closed fracture of ri Laterality Date   • ELECTROCARDIOGRAM, COMPLETE  11/19/2013    SCANNED TO MEDIA TAB - 11/19/2013   • TONSILLECTOMY           Medications Prior to Admission:  aspirin 325 MG Oral Tab EC Take 325 mg by mouth daily.  Disp:  Rfl:  4/13/2019 at Unknown time   AT MD    ceFAZolin sodium (ANCEF/KEFZOL) 2 GM/20ML premix IV syringe 2 g 2 g Intravenous Once Maria Fernanda Renteria MD    Vancomycin HCl (VANCOCIN) 1,500 mg in sodium chloride 0.9% 500 mL IVPB 1,500 mg Intravenous Q24H Leela Tiwari MD    [MAR Hold] atorvastat (Scoliosis) Brother    • Diabetes Paternal Grandfather      Social History    Socioeconomic History      Marital status:       Spouse name: Not on file      Number of children: Not on file      Years of education: Not on file      Highest education Asked    Social History Narrative      The patient does not use an assistive device. .        The patient does not live in a home with stairs. Available pre-op labs reviewed.   Lab Results   Component Value Carlos Alberto informed Estefany Newton of the risks of spinal anesthesia including, but not limited to: failure, headache, backache, difficulty breathing, infection, bleeding, nerve damage, paralysis, death. The patient desires the proposed anesthetic as planned.   Diffi

## 2019-04-16 NOTE — PROCEDURES
EEG report    REFERRING PHYSICIAN: Bhavesh Keen MD    PCP and phone number:  Sadi Ford MD  [phone]    TECHNIQUE: 21 channels of EEG, 2 channels of EOG, and 1 channel of EKG were recorded utilizing the International 10/20 System.  The r

## 2019-04-16 NOTE — PROGRESS NOTES
Neurology Inpatient Follow-up Note      HPI:     Patient being seen in follow-up. Brother-in-law at bedside. Patient a little bit irritable. Past Medical Hisotory:  Reviewed    Medications:  Reviewed    Allergies:     Ace Inhibitors          Coughing pressure was     increased consistent with mild pulmonary hypertension. Compared to the prior study there has been no significant interval  change.     ASSESSMENT/PLAN:   Ataxia, fall  History of right medullary stroke  I suspect ataxia and resultant fall

## 2019-04-16 NOTE — PROGRESS NOTES
[facility] dosing service    Follow-up Pharmacokinetic Consult for Vancomycin Dosing     Oral Collazo is a 77year old male who is being treated for cellulits. Patient is on day 3 of Vancomycin and is currently receiving 1.5g q12h.   Goal trough is 1

## 2019-04-16 NOTE — PROGRESS NOTES
[facility]    Progress Note    Yue Keyla Patient Status:  Inpatient    1950 MRN [id number]   Location [facility] 4W/SW/SE Attending Bernie Laboy MD   Hosp Day # 3 PCP Carolyn Rush MD       Subjective:   Rosaura Escobar incidental findings as above.    Dictated by (CST): Arlene Rodriguez MD on 4/15/2019 at 12:44     Approved by (CST): Arlene Rodriguez MD on 4/15/2019 at 12:48          Us Carotid Doppler Bilat - Diag Img (cpt=93880)    Result Date: 4/14/2019  CONCLUSION: 0-49 20 mg Oral BID   • finasteride  5 mg Oral Daily   • Alfuzosin HCl ER  10 mg Oral Daily with breakfast   • aspirin  300 mg Rectal Daily    Or   • aspirin  325 mg Oral Daily   • Senna  17.2 mg Oral Nightly     ipratropium-albuterol, HYDROcodone-acetaminophe

## 2019-04-16 NOTE — PROGRESS NOTES
[facility]    Cardiology Progress Note    Chris Barragan Patient Status:  Inpatient    1950 MRN [id number]   Location [facility] 4W/SW/SE Attending Jes New MD   Hosp Day # 3 PCP MD Vicente Blas Mt ortho surgery, no further cardiac w/u or rx indicated  2) Fall                ?syncope                Poor historian.  But no change in ekg, cardiac status or sx to warrant further w/u  3) cva hx 9/18  4) htn  5) tox screen                Opiate positive  6 Gm Blunt MD on 4/14/2019 at 15:48     Approved by (CST):  Gm Blunt MD on 4/14/2019 at 15:50                      ALBINA Toribio  [facility] Cardiology  04/15/19

## 2019-04-16 NOTE — CONSULTS
[facility]    Report of Consultation    Kimberli Chatterjee Patient Status:  Inpatient    1950 MRN [id number]   Location [facility] 4W/SW/SE Attending Naomie Jean MD   Hosp Day # 3 PCP Jesika Schwartz MD     Date of Admis tab 10 mg, 10 mg, Oral, Once  •  Vancomycin HCl (VANCOCIN) 1,500 mg in sodium chloride 0.9% 500 mL IVPB, 1,500 mg, Intravenous, Q24H  •  atorvastatin (LIPITOR) tab 20 mg, 20 mg, Oral, Nightly  •  famoTIDine (PEPCID) tab 20 mg, 20 mg, Oral, BID  •  finaster with sequela of chronic microangiopathy and large vessel atherosclerosis. 3. Punctate 2 mm metallic foreign body along the lateral margin of the right globe. Please note that this may preclude follow-up MR assessment.  4. Lesser incidental findings as abov 04/16/19  0517   RBC 3.53* 3.39*   HGB 10.9* 10.5*   HCT 33.8* 32.5*   MCV 95.8 95.9   MCH 30.9 31.0   MCHC 32.2 32.3   RDW 13.6 13.4   NEPRELIM 4.52  --    WBC 6.6 6.0   .0* 135.0*       Impression and Plan:  Patient Active Problem List:     Hyperl

## 2019-04-17 ENCOUNTER — APPOINTMENT (OUTPATIENT)
Dept: GENERAL RADIOLOGY | Facility: HOSPITAL | Age: 69
DRG: 480 | End: 2019-04-17
Attending: HOSPITALIST
Payer: MEDICARE

## 2019-04-17 PROCEDURE — 71045 X-RAY EXAM CHEST 1 VIEW: CPT | Performed by: HOSPITALIST

## 2019-04-17 PROCEDURE — 99233 SBSQ HOSP IP/OBS HIGH 50: CPT | Performed by: HOSPITALIST

## 2019-04-17 RX ORDER — IPRATROPIUM BROMIDE AND ALBUTEROL SULFATE 2.5; .5 MG/3ML; MG/3ML
3 SOLUTION RESPIRATORY (INHALATION) EVERY 4 HOURS PRN
Status: DISCONTINUED | OUTPATIENT
Start: 2019-04-17 | End: 2019-04-18

## 2019-04-17 RX ORDER — FUROSEMIDE 10 MG/ML
20 INJECTION INTRAMUSCULAR; INTRAVENOUS ONCE
Status: COMPLETED | OUTPATIENT
Start: 2019-04-17 | End: 2019-04-17

## 2019-04-17 RX ORDER — METOPROLOL TARTRATE 50 MG/1
50 TABLET, FILM COATED ORAL
Status: DISCONTINUED | OUTPATIENT
Start: 2019-04-17 | End: 2019-04-19

## 2019-04-17 NOTE — CM/SW NOTE
TALON followed up with the patient and his family re:rehab choice, referrals sent to [facility], [address], DON screen requested.     Marcin Harding, [address]

## 2019-04-17 NOTE — PHYSICAL THERAPY NOTE
PHYSICAL THERAPY HIP EVALUATION - INPATIENT     Room Number: 920/860-W  Evaluation Date: 4/17/2019  Type of Evaluation: Initial  Physician Order: PT Eval and Treat    Presenting Problem: RLE hip pinning s/p fall with fx  Reason for Therapy: Mobility Dysfun training;Strengthening;Stair training;Transfer training;Balance training;Range of motion  Rehab Potential : Fair  Frequency (Obs): BID       PHYSICAL THERAPY MEDICAL/SOCIAL HISTORY     History related to current admission: Previous CVA feeling drowsy from movement(0/10 at rest in chair or bed)  Management Techniques: Activity promotion; Body mechanics;Breathing techniques;Relaxation;Repositioning    COGNITION  · Overall Cognitive Status:  WFL - within functional limits, anxious distractable    RANGE OF MOTIO Provided:  Bed mobility  Body mechanics  Energy conservation  Functional activity tolerated  Gait training  Posture  ROM  Strengthening  Lower therapeutic exercise:   Ankle pumps  Heel slides  Quad sets  Transfer training    Patient End of Session: Up in

## 2019-04-17 NOTE — PROGRESS NOTES
[facility] HOSP - [facility]    Cardiology Progress Note    Silvinoliz Bergeron Patient Status:  Inpatient    1950 MRN [id number]   Location [facility] 4W/SW/SE Attending Marlin Cast MD   Hosp Day # 4 PCP MD [employer]    Objective:    Assessment and Plan:       1) Pre-op                EKG unchanged, rbbb, first degree delay,  old imi                No chf, angina, known normal ef                Activity limited d/t cva hx\                Carotid dopplers MD on 4/15/2019 at 12:44     Approved by (CST): Yaquelin Loredo MD on 4/15/2019 at 12:48          Xr Fluoroscopy C-arm Time <1 Hour  (cpt=76000)    Result Date: 4/16/2019  CONCLUSION:  1.  Fluoroscopic guidance utilized during ORIF procedure right femoral i

## 2019-04-17 NOTE — PROGRESS NOTES
[facility]    Progress Note    Jay Miranda Patient Status:  Inpatient    1950 MRN [id number]   Location [facility] 4W/SW/SE Attending Anderson Ackerman MD   Hosp Day # 4 PCP Marielle Conley MD       Subjective:   Joan Arteaga assessment. 4. Lesser incidental findings as above.    Dictated by (CST): Jay Delaney MD on 4/15/2019 at 12:44     Approved by (CST): Jay Delaney MD on 4/15/2019 at 12:48          Xr Fluoroscopy C-arm Time <1 Hour  (cpt=76000)    Result Date: 4/16/2 HYDROmorphone HCl, diphenhydrAMINE HCl **OR** diphenhydrAMINE, Nalbuphine HCl, [MAR Hold] HYDROcodone-acetaminophen, [MAR Hold] Normal Saline Flush, [MAR Hold] HYDROmorphone HCl, [MAR Hold] HYDROmorphone HCl      Assessment and Plan:     Closed fracture of

## 2019-04-17 NOTE — OPERATIVE REPORT
[facility]    PATIENT'S NAME: Michael Stanford   ATTENDING PHYSICIAN: Niles Sacks, MD   OPERATING PHYSICIAN: Spenser Figueroa MD   PATIENT ACCOUNT#:   [id number]    LOCATION:  [address] [place] [address],[address] #:   [id number]       DATE OF BIRTH initials were placed. He was transferred to the operating room and transferred to the operating table in the supine position. Spinal anesthesia was attempted, but general anesthesia was also induced.   The patient was given Ancef as antibiotic prophylaxis medical management, postoperative pain control, postoperative DVT prophylaxis, and postoperative antibiotic therapy. All of his questions were answered. He was in agreement with the treatment plan.      Dictated By Bc Ruffin MD  d: 04/16/2019 19

## 2019-04-17 NOTE — PROGRESS NOTES
[facility] HOSP - [facility]    Cardiology Progress Note    Lennie Mas Patient Status:  Inpatient    1950 MRN [id number]   Location [facility] 4W/SW/SE Attending Nell Cho MD   Hosp Day # 4 PCP Alicja Leavitt MD     Assessment • [MAR Hold] aspirin  300 mg Rectal Daily    Or   • [MAR Hold] aspirin  325 mg Oral Daily       Continuous Infusions:   • sodium chloride 83 mL/hr at 04/16/19 0945   • lactated ringers 100 mL/hr at 04/17/19 0537       Exam  Gen: No acute distress, alert (CST): Moreno Suh MD on 4/16/2019 at 23:29     Approved by (CST): Moreno Suh MD on 4/16/2019 at 23:31          Xr Hip W Or Wo Pelvis 1 View, Right (cpt=73501)    Result Date: 4/16/2019  CONCLUSION:  1.  Right femoral intertrochanteric frac

## 2019-04-17 NOTE — ANESTHESIA PROCEDURE NOTES
Spinal Block  Performed by: Eros Mandel MD  Authorized by: Eros Mandel MD     Start Time:  4/16/2019 6:17 PM  End Time:  4/16/2019 6:40 PM  Site identification: surface landmarks    Reason for Block: surgical anesthesia    Anesthesiologist:  Less

## 2019-04-17 NOTE — BRIEF OP NOTE
Pre-Operative Diagnosis: Closed fracture of right hip intertrochanteric fracture     Post-Operative Diagnosis: same     Procedure Performed:   Procedure(s):  RIGHT HIP CLOSED REDUCTION & INTRAMEDULLARY NAIL FIXATION    Surgeon(s) and Role:     Thony Santos,

## 2019-04-17 NOTE — ANESTHESIA PROCEDURE NOTES
Peripheral IV  Date/Time: 4/16/2019 6:40 PM  Inserted by: Georgiana Mandel MD    Placement  Needle size: 18 G  Laterality: left  Location: hand  Site prep: alcohol  Attempts: 2

## 2019-04-17 NOTE — PHYSICAL THERAPY NOTE
PT PM session: Pt education with therex for BLE's in chair with quad sets, ankle pumps and LAQ with AAROM x 10 reps 20 reps for ankle pumps. A written therex sheet was issued to assist with therex intermittently throughout the day.  Staff max A x 2 is requi

## 2019-04-17 NOTE — ANESTHESIA POSTPROCEDURE EVALUATION
Patient: Jay Miranda    Procedure Summary     Date:  04/16/19 Room / Location:  [address] [place] [address] MAIN OR 06 / [address] [place] [address] MAIN OR    Anesthesia Start:  1801 Anesthesia Stop:  2007    Procedure:  HIP PINNING (Right ) Diagnosis:       Closed fracture of right hip with routin

## 2019-04-17 NOTE — ANESTHESIA POST-OP FOLLOW-UP NOTE
S/p ORIF/ intrathecal Duramorph,subsequent GA   POD # 1,doing well   Denies HA no BA no fever  No new neurologic signs or symptoms  No itching  Site is clean dry intact   D/c from service

## 2019-04-17 NOTE — PROGRESS NOTES
Neurology Inpatient interval note    Interval notes, workup reviewed:  Treponemal antibody screen negative      EEG  IMPRESSION:  This is a normal EEG. No focal, lateralized, or epileptiform features are noted. Clinical correlation required.     ASSESSMENT/

## 2019-04-17 NOTE — PLAN OF CARE
Problem: Patient/Family Goals  Goal: Patient/Family Long Term Goal  Description  Patient's Long Term Goal: return to previous level of functioning    Interventions:  - physical therapy  - See additional Care Plan goals for specific interventions  Outcome - ADULT  Goal: Absence of fever/infection during anticipated neutropenic period  Description  INTERVENTIONS  - Monitor WBC  - Administer growth factors as ordered  - Implement neutropenic guidelines  Outcome: Progressing     Problem: SAFETY ADULT - FALL  G as needed  - Follow urinary retention protocol/standard of care  - Consider collaborating with pharmacy to review patient's medication profile  - Implement strategies to promote bladder emptying  Outcome: Progressing     Problem: SKIN/TISSUE INTEGRITY - AD in a chair for several hours. Right hip dressing with minimal drainage, left foot/ankle dressings changed. Right hip pain is under control with oral pain meds. Plan is for him to go to rehab after this hospitalization.

## 2019-04-18 PROCEDURE — 99233 SBSQ HOSP IP/OBS HIGH 50: CPT | Performed by: HOSPITALIST

## 2019-04-18 RX ORDER — FUROSEMIDE 10 MG/ML
20 INJECTION INTRAMUSCULAR; INTRAVENOUS ONCE
Status: COMPLETED | OUTPATIENT
Start: 2019-04-18 | End: 2019-04-18

## 2019-04-18 RX ORDER — IPRATROPIUM BROMIDE AND ALBUTEROL SULFATE 2.5; .5 MG/3ML; MG/3ML
3 SOLUTION RESPIRATORY (INHALATION) EVERY 6 HOURS PRN
Status: DISCONTINUED | OUTPATIENT
Start: 2019-04-18 | End: 2019-04-19

## 2019-04-18 NOTE — PROGRESS NOTES
[facility]    Progress Note    Ranjit Yancey Patient Status:  Inpatient    1950 MRN [id number]   Location [facility] 4W/SW/SE Attending Khari Basilio MD   Hosp Day # 5 PCP Ben Gage MD       Subjective:   Romeo Escudero Fluoroscopy C-arm Time <1 Hour  (cpt=76000)    Result Date: 4/16/2019  CONCLUSION:  1. Fluoroscopic guidance utilized during ORIF procedure right femoral intertrochanteric fracture. 2. Position alignment is anatomic.   Hardware intact    Dictated by (CST): IS  No antibiotics for now   Wean oxygen to 92 percent   20 IV lasix X1 again  Already received 20 mg IV before     Acute diastolic CHF  - 20 IV lasix X1 again   - cards following     Recurrent falls  Secondary to probable underlying dementia with history

## 2019-04-18 NOTE — PLAN OF CARE
Problem: PAIN - ADULT  Goal: Verbalizes/displays adequate comfort level or patient's stated pain goal  Description  INTERVENTIONS:  - Encourage pt to monitor pain and request assistance  - Assess pain using appropriate pain scale  - Administer analgesics barriers to discharge w/pt and caregiver  - Include patient/family/discharge partner in discharge planning  - Arrange for needed discharge resources and transportation as appropriate  - Identify discharge learning needs (meds, wound care, etc)  - Arrange f orders  - Initiate isolation precautions as appropriate  - Initiate Pressure Ulcer prevention bundle as indicated  Outcome: Progressing  Note:   Gauze, ABD pad, and tape over incision. Some shadows of drainage seen. Will monitor.   Aquacel foam dressings ov

## 2019-04-18 NOTE — PROGRESS NOTES
[facility] HOSP - [facility]    Cardiology Progress Note    Jay Miranda Patient Status:  Inpatient    1950 MRN [id number]   Location [facility] 4W/SW/SE Attending Herminia Baca MD   Hosp Day # 5 PCP Marielle Conley MD     Assessment and S2 regular rate and rhythm  Abd: Abdomen soft, nontender, nondistended  Extremities: 1+ LE edema  Psych: Cooperative    Objective:        Results:     Lab Results   Component Value Date    WBC 7.0 04/18/2019    HGB 8.4 (L) 04/18/2019    HCT 26.6 (L) 04 labs reviewed.  Agree with assessment and plan with changes and additions included within note (merged).   S/no cp  O/lung bcta  A/P:iv lasix x 1  Monitor upon d/c    Chel Morel MD  [address] [place] [address]  Cardiac Electrophysiology      Ramona Collazo

## 2019-04-18 NOTE — PLAN OF CARE
Problem: Patient/Family Goals  Goal: Patient/Family Long Term Goal  Description  Patient's Long Term Goal:return to previous level of functioning    Intervention:   - PT  - rehab  - education   Outcome: Progressing  Goal: Patient/Family Short Term Goal period  Description  INTERVENTIONS  - Monitor WBC  - Administer growth factors as ordered  - Implement neutropenic guidelines  Outcome: Progressing     Problem: SAFETY ADULT - FALL  Goal: Free from fall injury  Description  INTERVENTIONS:  - Assess pt freq Consider collaborating with pharmacy to review patient's medication profile  - Implement strategies to promote bladder emptying  Outcome: Progressing     Problem: SKIN/TISSUE INTEGRITY - ADULT  Goal: Skin integrity remains intact  Description  INTERVENTION PRN.  Dressing C/D/I. VS stable. Arthur in place for strict I&O's, draining to gravity. Tolerating diet. Lovenox for anti- coag. On RA. Up with 2 person max assist. Remote tele in place. Fall precautions maintained.  Bed locked and in lowest position, uses c

## 2019-04-18 NOTE — OCCUPATIONAL THERAPY NOTE
OCCUPATIONAL THERAPY TREATMENT NOTE - INPATIENT    Room Number: 624/520-W         Presenting Problem: (s/p right closed hip reduction and nail fixation)     Problem List  Principal Problem:    [facility]  Active P encouragement    OBJECTIVE  Precautions: Bed/chair alarm    WEIGHT BEARING RESTRICTION  Weight Bearing Restriction: R lower extremity        R Lower Extremity: Weight Bearing as Tolerated       PAIN ASSESSMENT  Rating: Unable to rate  Location: (R LE)  Man Comment: max assist    Patient will complete toilet transfer with min a   Comment: max x 2 to rolling commode chair    Patient will complete self care task at sink level with min a    Comment: min assist with set up and pt seated                  Goals Exp

## 2019-04-18 NOTE — CM/SW NOTE
Patient has been accepted to all 3 facilities, [facility], [facility] and [facility]. SW confirmed this  with the patient, he told the SW that he is not able to decide on what his preferred facility is at this time. However, pt's sister Pili Roach called [facility]

## 2019-04-18 NOTE — PHYSICAL THERAPY NOTE
PHYSICAL THERAPY HIP TREATMENT NOTE - INPATIENT    Room Number: 121/782-M            Presenting Problem: RLE hip pinning s/p fall with fx    Problem List  Principal Problem:    Closed fracture of right hip, initial encounter [facility])  Active Problems:    Clos adjusting bedclothes, sheets and blankets)?: A Lot   -   Sitting down on and standing up from a chair with arms (e.g., wheelchair, bedside commode, etc.): A Lot   -   Moving from lying on back to sitting on the side of the bed?: A Lot   How much help from concerns independently   Goal #5   Current Status ongoing   Goal #6 Patient independently performs home exercise program for ROM/strengthening per the instructions provided in preparation for discharge.    Goal #6  Current Status ongoing

## 2019-04-19 VITALS
WEIGHT: 200 LBS | HEIGHT: 66 IN | BODY MASS INDEX: 32.14 KG/M2 | TEMPERATURE: 98 F | HEART RATE: 87 BPM | RESPIRATION RATE: 20 BRPM | OXYGEN SATURATION: 95 % | DIASTOLIC BLOOD PRESSURE: 62 MMHG | SYSTOLIC BLOOD PRESSURE: 119 MMHG

## 2019-04-19 PROCEDURE — 99239 HOSP IP/OBS DSCHRG MGMT >30: CPT | Performed by: HOSPITALIST

## 2019-04-19 RX ORDER — 0.9 % SODIUM CHLORIDE 0.9 %
VIAL (ML) INJECTION
Status: DISCONTINUED
Start: 2019-04-19 | End: 2019-04-19

## 2019-04-19 RX ORDER — FUROSEMIDE 40 MG/1
40 TABLET ORAL DAILY
Refills: 0 | Status: SHIPPED | COMMUNITY
Start: 2019-04-19 | End: 2019-05-31 | Stop reason: DRUGHIGH

## 2019-04-19 RX ORDER — HYDROCODONE BITARTRATE AND ACETAMINOPHEN 5; 325 MG/1; MG/1
1 TABLET ORAL EVERY 4 HOURS PRN
Qty: 42 TABLET | Refills: 0 | Status: SHIPPED | OUTPATIENT
Start: 2019-04-19 | End: 2020-08-03 | Stop reason: ALTCHOICE

## 2019-04-19 RX ORDER — FUROSEMIDE 40 MG/1
40 TABLET ORAL DAILY
Status: DISCONTINUED | OUTPATIENT
Start: 2019-04-19 | End: 2019-04-19

## 2019-04-19 RX ORDER — ASPIRIN 325 MG
325 TABLET ORAL 2 TIMES DAILY
Status: DISCONTINUED | OUTPATIENT
Start: 2019-04-19 | End: 2019-04-19

## 2019-04-19 RX ORDER — ASPIRIN 325 MG
325 TABLET ORAL 2 TIMES DAILY
Qty: 28 TABLET | Refills: 0 | Status: SHIPPED | OUTPATIENT
Start: 2019-04-19 | End: 2019-05-29

## 2019-04-19 NOTE — PLAN OF CARE
Problem: Patient/Family Goals  Goal: Patient/Family Long Term Goal  Description  Patient's Long Term Goal:return to previous level of functioning    Intervention:   - PT  - rehab  - education   Outcome: Adequate for Discharge  Goal: Patient/Family Short anticipated neutropenic period  Description  INTERVENTIONS  - Monitor WBC  - Administer growth factors as ordered  - Implement neutropenic guidelines  Outcome: Adequate for Discharge     Problem: SAFETY ADULT - FALL  Goal: Free from fall injury  Descriptio Follow urinary retention protocol/standard of care  - Consider collaborating with pharmacy to review patient's medication profile  - Implement strategies to promote bladder emptying  Outcome: Adequate for Discharge     Problem: SKIN/TISSUE INTEGRITY - ADUL precautions)  Outcome: Adequate for Discharge   Taking Norco  prn pain. Up with 2 assist and walker. Had BM to transfer to rehab with lorenzo in place.   Report called to rehab O2 2l per nc in use

## 2019-04-19 NOTE — DISCHARGE SUMMARY
[facility]    Discharge Summary    Sherrie Cummings Patient Status:  Inpatient    1950 MRN [id number]   Location [facility] 4W/SW/SE Attending Robbin Franco MD   Hosp Day # 6 PCP Shari Leigh MD     Date of Admission: intramedullary nail fixation POD 2  Continue pain meds   Asa 325 BID for 2 weeks for DVT proph if ok by ortho  Appreciate ortho recs     Hypoxia  Check cxr - probable consolidation or atelectasis  No fever or wbc count  Continue IS  No antibiotics for now NIGHT    METOPROLOL TARTRATE 50 MG Oral Tab  TAKE 1 TABLET(50 MG) BY MOUTH TWICE DAILY    FAMOTIDINE 20 MG Oral Tab  TAKE 1 TABLET BY MOUTH TWICE DAILY FOR GERD    finasteride 5 MG Oral Tab  Take 1 tablet (5 mg total) by mouth daily.     docusate sodium 100 as needed for constipation.    Quantity:  30 capsule  Refills:  0     famoTIDine 20 MG Tabs  Commonly known as:  PEPCID      TAKE 1 TABLET BY MOUTH TWICE DAILY FOR GERD   Quantity:  60 tablet  Refills:  2     finasteride 5 MG Tabs  Commonly known as:  PROSC every 3 days. Follow up in 2 weeks from day of surgery .         Time spent:  > 30 minutes    Tacos English  4/19/2019

## 2019-04-19 NOTE — OCCUPATIONAL THERAPY NOTE
OCCUPATIONAL THERAPY TREATMENT NOTE - INPATIENT    Room Number: 037/219-L         Presenting Problem: (s/p right closed hip reduction and nail fixation)     Problem List  Principal Problem:    [facility]  Active P O2 SATURATIONS           Ambulation oxygen flow (liters per minute): 2    ACTIVITIES OF DAILY LIVING ASSESSMENT  AM-PAC ‘6-Clicks’ Inpatient Daily Activity Short Form  How much help from another person does the patient currently need…  - on:  4/24  Frequency: 3-5x a week

## 2019-04-19 NOTE — PHYSICAL THERAPY NOTE
PHYSICAL THERAPY HIP TREATMENT NOTE - INPATIENT    Room Number: 301/946-O            Presenting Problem: RLE hip pinning s/p fall with fx    Problem List  Principal Problem:    Closed fracture of right hip, initial encounter [facility])  Active Problems:    Clos FORM - BASIC MOBILITY  How much difficulty does the patient currently have. ..  -   Turning over in bed (including adjusting bedclothes, sheets and blankets)?: A Lot   -   Sitting down on and standing up from a chair with arms (e.g., wheelchair, bedside com Goal #4   Current Status NT   Goal #5 Patient verbalizes and/or demonstrates all precautions and safety concerns independently   Goal #5   Current Status In progress   Goal #6 Patient independently performs home exercise program for ROM/strengthening per

## 2019-04-19 NOTE — PROGRESS NOTES
[facility]OSP - [facility]    Cardiology Progress Note    Lauren Wilson Patient Status:  Inpatient    1950 MRN [id number]   Location [facility] 4W/SW/SE Attending Magui Gamez MD   Hosp Day # 6 PCP Stefani Hobbs MD     Primary Car start lasix 40mg PO daily    cva hx 9/18  - stable  - resume asa on DC    Essential hypertension, controlled  - Continue BB  - holding losartan and Amlodipine - restart as outpatient    tox screen  - Opiate positive       Recommendations:     HR stable and

## 2019-04-19 NOTE — CM/SW NOTE
The pt. Has been accepted at [facility]. The pt. Is scheduled to discharge to [facility] today 4/19 at 4p, via ambulance, as he is unable to sit and is on 2LO2. The pt's sister Linda Valles is aware and agreeable to the above.       PCS form is on the chart for

## 2019-04-19 NOTE — PLAN OF CARE
Problem: Patient/Family Goals  Goal: Patient/Family Long Term Goal  Description  Patient's Long Term Goal:return to previous level of functioning    Intervention:   - PT  - rehab  - education   Outcome: Progressing  Goal: Patient/Family Short Term Goal period  Description  INTERVENTIONS  - Monitor WBC  - Administer growth factors as ordered  - Implement neutropenic guidelines  Outcome: Progressing     Problem: SAFETY ADULT - FALL  Goal: Free from fall injury  Description  INTERVENTIONS:  - Assess pt freq Consider collaborating with pharmacy to review patient's medication profile  - Implement strategies to promote bladder emptying  Outcome: Progressing     Problem: SKIN/TISSUE INTEGRITY - ADULT  Goal: Skin integrity remains intact  Description  INTERVENTION Dressing C/D/I. VS stable. Arthur in place, draining to gravity. Tolerating diet. On 2L O2 via NC. Up with 3 person max assist or lift. Fall precautions maintained.  Bed locked and in lowest position, uses call light approprietly, call light and belonging in

## 2019-04-25 ENCOUNTER — TELEPHONE (OUTPATIENT)
Dept: CARDIOLOGY | Age: 69
End: 2019-04-25

## 2019-04-25 ENCOUNTER — APPOINTMENT (OUTPATIENT)
Dept: CARDIOLOGY | Age: 69
End: 2019-04-25

## 2019-04-25 RX ORDER — AMLODIPINE BESYLATE 5 MG/1
TABLET ORAL
COMMUNITY

## 2019-04-29 ENCOUNTER — TELEPHONE (OUTPATIENT)
Dept: SURGERY | Facility: CLINIC | Age: 69
End: 2019-04-29

## 2019-04-29 ENCOUNTER — TELEPHONE (OUTPATIENT)
Dept: CARDIOLOGY | Age: 69
End: 2019-04-29

## 2019-04-29 PROBLEM — I10 HYPERTENSION: Status: ACTIVE | Noted: 2019-04-29

## 2019-04-29 PROBLEM — R60.9 EDEMA: Status: ACTIVE | Noted: 2019-04-29

## 2019-04-29 PROBLEM — R94.31 ABNORMAL EKG: Status: ACTIVE | Noted: 2019-04-29

## 2019-04-29 PROBLEM — R07.89 CHEST DISCOMFORT: Status: ACTIVE | Noted: 2019-04-29

## 2019-04-29 PROBLEM — J40 BRONCHITIS: Status: ACTIVE | Noted: 2019-04-29

## 2019-04-29 NOTE — TELEPHONE ENCOUNTER
pts sister calling with questions regarding pts catheter. States she would like to speak with an RN. pls call thank you.

## 2019-04-30 ENCOUNTER — OFFICE VISIT (OUTPATIENT)
Dept: CARDIOLOGY | Age: 69
End: 2019-04-30

## 2019-04-30 VITALS
DIASTOLIC BLOOD PRESSURE: 70 MMHG | OXYGEN SATURATION: 96 % | SYSTOLIC BLOOD PRESSURE: 136 MMHG | WEIGHT: 190 LBS | HEIGHT: 65 IN | BODY MASS INDEX: 31.65 KG/M2 | HEART RATE: 107 BPM

## 2019-04-30 DIAGNOSIS — R60.0 LOCALIZED EDEMA: ICD-10-CM

## 2019-04-30 DIAGNOSIS — I47.10 PSVT (PAROXYSMAL SUPRAVENTRICULAR TACHYCARDIA): ICD-10-CM

## 2019-04-30 DIAGNOSIS — R94.31 ABNORMAL EKG: Primary | ICD-10-CM

## 2019-04-30 DIAGNOSIS — I82.492 DEEP VEIN THROMBOSIS (DVT) OF OTHER VEIN OF LEFT LOWER EXTREMITY, UNSPECIFIED CHRONICITY (CMD): ICD-10-CM

## 2019-04-30 PROCEDURE — 99214 OFFICE O/P EST MOD 30 MIN: CPT | Performed by: INTERNAL MEDICINE

## 2019-04-30 PROCEDURE — 93000 ELECTROCARDIOGRAM COMPLETE: CPT | Performed by: INTERNAL MEDICINE

## 2019-04-30 RX ORDER — HYDROCODONE BITARTRATE AND ACETAMINOPHEN 5; 325 MG/1; MG/1
1 TABLET ORAL EVERY 6 HOURS PRN
COMMUNITY

## 2019-04-30 RX ORDER — TAMSULOSIN HYDROCHLORIDE 0.4 MG/1
0.4 CAPSULE ORAL
COMMUNITY

## 2019-04-30 RX ORDER — FUROSEMIDE 40 MG/1
40 TABLET ORAL 2 TIMES DAILY
COMMUNITY

## 2019-04-30 RX ORDER — MELOXICAM 7.5 MG/1
7.5 TABLET ORAL DAILY
COMMUNITY

## 2019-04-30 RX ORDER — BISACODYL 10 MG
10 SUPPOSITORY, RECTAL RECTAL DAILY PRN
COMMUNITY

## 2019-04-30 RX ORDER — ATORVASTATIN CALCIUM 20 MG/1
20 TABLET, FILM COATED ORAL DAILY
COMMUNITY

## 2019-04-30 RX ORDER — FINASTERIDE 5 MG/1
5 TABLET, FILM COATED ORAL DAILY
COMMUNITY

## 2019-04-30 RX ORDER — METOPROLOL TARTRATE 50 MG/1
50 TABLET, FILM COATED ORAL 2 TIMES DAILY
COMMUNITY

## 2019-04-30 RX ORDER — DOCUSATE SODIUM 100 MG/1
100 CAPSULE, LIQUID FILLED ORAL 2 TIMES DAILY
COMMUNITY

## 2019-04-30 RX ORDER — FAMOTIDINE 20 MG/1
20 TABLET, FILM COATED ORAL 2 TIMES DAILY
COMMUNITY

## 2019-05-02 NOTE — TELEPHONE ENCOUNTER
Spoke with pt's sister who has a signed MARIO on file and I determined that pt broke his hip and is in the nursing home in rehab and he had a cath placed after the surgery because he was unable to urinate and pt was urinating fine before the hip surgery.  She

## 2019-05-02 NOTE — TELEPHONE ENCOUNTER
LMTCB. PLAN:  1. Continue Flomax 0.4 mg PO QHS and Proscar 5 mg PO QD (refills provided)     2.  RTC in 6 months for f/u      Ирина Hdez MD  9/12/2018 9:56 AM

## 2019-05-11 NOTE — TELEPHONE ENCOUNTER
rx passes protocol, however RN requesting MD review. Amlodipine discontinued on 4/13/19 during hospital stay.  pls advise on refill request?      STOP taking these medications    amLODIPine Besylate 5 MG Tabs  Commonly known as:  NORVASC         Losartan

## 2019-05-12 RX ORDER — AMLODIPINE BESYLATE 5 MG/1
TABLET ORAL
Qty: 90 TABLET | Refills: 0 | Status: SHIPPED | OUTPATIENT
Start: 2019-05-12 | End: 2019-08-09

## 2019-05-14 ENCOUNTER — OFFICE VISIT (OUTPATIENT)
Dept: SURGERY | Facility: CLINIC | Age: 69
End: 2019-05-14
Payer: MEDICARE

## 2019-05-14 VITALS
HEART RATE: 77 BPM | DIASTOLIC BLOOD PRESSURE: 83 MMHG | SYSTOLIC BLOOD PRESSURE: 137 MMHG | BODY MASS INDEX: 32 KG/M2 | WEIGHT: 200 LBS

## 2019-05-14 DIAGNOSIS — R35.0 BENIGN PROSTATIC HYPERPLASIA WITH URINARY FREQUENCY: Primary | ICD-10-CM

## 2019-05-14 DIAGNOSIS — N40.1 BENIGN PROSTATIC HYPERPLASIA WITH URINARY FREQUENCY: Primary | ICD-10-CM

## 2019-05-14 PROCEDURE — 99213 OFFICE O/P EST LOW 20 MIN: CPT | Performed by: UROLOGY

## 2019-05-14 PROCEDURE — G0463 HOSPITAL OUTPT CLINIC VISIT: HCPCS | Performed by: UROLOGY

## 2019-05-14 NOTE — PROGRESS NOTES
[facility]  Follow-Up Visit    HPI: April Posada is a 77year old male presents for a follow up visit. Patient was last seen on 9/12/2018. INTERVAL HISTORY: Patient recently fell and fractured his right hip.  Required IM nail placement by found.     IMPRESSION:  60-year-old male with BPH and history of urinary retention, on maximal medical therapy with daily Flomax and Proscar. Doing well from a  standpoint. Reviewed findings with the patient and his accompanying son.   His voiding symp

## 2019-05-20 ENCOUNTER — LAB REQUISITION (OUTPATIENT)
Dept: LAB | Facility: HOSPITAL | Age: 69
End: 2019-05-20

## 2019-05-20 DIAGNOSIS — S72.141D CLOSED DISPLACED INTERTROCHANTERIC FRACTURE OF RIGHT FEMUR WITH ROUTINE HEALING: ICD-10-CM

## 2019-05-20 PROCEDURE — 80053 COMPREHEN METABOLIC PANEL: CPT | Performed by: INTERNAL MEDICINE

## 2019-05-20 PROCEDURE — 85025 COMPLETE CBC W/AUTO DIFF WBC: CPT | Performed by: INTERNAL MEDICINE

## 2019-05-23 ENCOUNTER — TELEPHONE (OUTPATIENT)
Dept: INTERNAL MEDICINE CLINIC | Facility: CLINIC | Age: 69
End: 2019-05-23

## 2019-05-23 ENCOUNTER — TELEPHONE (OUTPATIENT)
Dept: OTHER | Age: 69
End: 2019-05-23

## 2019-05-23 NOTE — TELEPHONE ENCOUNTER
Pt called in stating that he was just discharged from rehab facility [facility]) and they were to have sent over a list of medications Pt had been on while in the facility. Calling to confirm it was received.     Pt states that he may need refills but t

## 2019-05-28 ENCOUNTER — OFFICE VISIT (OUTPATIENT)
Dept: INTERNAL MEDICINE CLINIC | Facility: CLINIC | Age: 69
End: 2019-05-28
Payer: MEDICARE

## 2019-05-28 ENCOUNTER — TELEPHONE (OUTPATIENT)
Dept: INTERNAL MEDICINE CLINIC | Facility: CLINIC | Age: 69
End: 2019-05-28

## 2019-05-28 ENCOUNTER — APPOINTMENT (OUTPATIENT)
Dept: LAB | Age: 69
End: 2019-05-28
Attending: INTERNAL MEDICINE

## 2019-05-28 VITALS
TEMPERATURE: 98 F | DIASTOLIC BLOOD PRESSURE: 78 MMHG | HEART RATE: 73 BPM | SYSTOLIC BLOOD PRESSURE: 149 MMHG | WEIGHT: 191.19 LBS | BODY MASS INDEX: 31 KG/M2

## 2019-05-28 DIAGNOSIS — Z86.73 HISTORY OF CVA (CEREBROVASCULAR ACCIDENT): ICD-10-CM

## 2019-05-28 DIAGNOSIS — E78.00 PURE HYPERCHOLESTEROLEMIA: ICD-10-CM

## 2019-05-28 DIAGNOSIS — R73.03 PREDIABETES: ICD-10-CM

## 2019-05-28 DIAGNOSIS — R79.89 ELEVATED SERUM CREATININE: ICD-10-CM

## 2019-05-28 DIAGNOSIS — Z09 ENCOUNTER FOR FOLLOW-UP OF ACUTE DEEP VEIN THROMBOSIS (DVT) OF RIGHT LOWER EXTREMITY: ICD-10-CM

## 2019-05-28 DIAGNOSIS — S72.001S CLOSED FRACTURE OF RIGHT HIP, SEQUELA: Primary | ICD-10-CM

## 2019-05-28 DIAGNOSIS — I10 ESSENTIAL HYPERTENSION: ICD-10-CM

## 2019-05-28 DIAGNOSIS — Z86.718 ENCOUNTER FOR FOLLOW-UP OF ACUTE DEEP VEIN THROMBOSIS (DVT) OF RIGHT LOWER EXTREMITY: ICD-10-CM

## 2019-05-28 PROBLEM — I82.4Y1 ACUTE DEEP VEIN THROMBOSIS (DVT) OF PROXIMAL VEIN OF RIGHT LOWER EXTREMITY (HCC): Status: ACTIVE | Noted: 2019-05-28

## 2019-05-28 PROCEDURE — 80048 BASIC METABOLIC PNL TOTAL CA: CPT

## 2019-05-28 PROCEDURE — 99214 OFFICE O/P EST MOD 30 MIN: CPT | Performed by: INTERNAL MEDICINE

## 2019-05-28 PROCEDURE — 36415 COLL VENOUS BLD VENIPUNCTURE: CPT

## 2019-05-28 PROCEDURE — G0463 HOSPITAL OUTPT CLINIC VISIT: HCPCS | Performed by: INTERNAL MEDICINE

## 2019-05-28 RX ORDER — FAMOTIDINE 20 MG/1
TABLET ORAL
Qty: 60 TABLET | Refills: 2 | Status: SHIPPED | OUTPATIENT
Start: 2019-05-28 | End: 2019-08-20

## 2019-05-28 RX ORDER — MELOXICAM 7.5 MG/1
7.5 TABLET ORAL 2 TIMES DAILY
COMMUNITY
End: 2019-05-28

## 2019-05-28 RX ORDER — FINASTERIDE 5 MG/1
5 TABLET, FILM COATED ORAL DAILY
Qty: 90 TABLET | Refills: 0 | Status: SHIPPED | OUTPATIENT
Start: 2019-05-28 | End: 2019-11-07

## 2019-05-28 NOTE — PROGRESS NOTES
HPI:    Patient ID: Tyler Aguilar is a 77year old male. Patient presents today for his posthospital/rehab ffup. He was admitted at [facility] about 1month ago after having fallen and breaking his right hip. He underwent left hip pinning for his fracture.  He factors for coronary artery disease include dyslipidemia, hypertension, male sex and a sedentary lifestyle. Review of Systems   Constitutional: Negative. HENT: Negative. Eyes: Negative. Respiratory: Negative.   Negative for shortness of breat distress. Pt using walker to ambulate     HENT:   Right Ear: External ear normal.   Left Ear: External ear normal.   Nose: Nose normal.   Mouth/Throat: No oropharyngeal exudate. Eyes: Conjunctivae are normal. Right eye exhibits no discharge.  Left eye e (I85.91) Elevated serum creatinine  Plan: BASIC METABOLIC PANEL (8)        Pt's creatinine was up to 1.5 last week. Recheck his bmp today. No orders of the defined types were placed in this encounter.       Meds This Visit:  Requested [address]

## 2019-05-28 NOTE — TELEPHONE ENCOUNTER
Pt's son called in stating that pt was seen in the office today and requested a refill for Xarelto. Pt's son stated that the Rx for Xarelto was not sent to the pharmacy.   Please advise

## 2019-05-30 ENCOUNTER — TELEPHONE (OUTPATIENT)
Dept: INTERNAL MEDICINE CLINIC | Facility: CLINIC | Age: 69
End: 2019-05-30

## 2019-05-30 ENCOUNTER — OFFICE VISIT (OUTPATIENT)
Dept: PODIATRY CLINIC | Facility: CLINIC | Age: 69
End: 2019-05-30
Payer: MEDICARE

## 2019-05-30 DIAGNOSIS — B35.1 ONYCHOMYCOSIS: Primary | ICD-10-CM

## 2019-05-30 DIAGNOSIS — R79.89 ELEVATED SERUM CREATININE: Primary | ICD-10-CM

## 2019-05-30 DIAGNOSIS — L60.2 ONYCHOGRYPHOSIS: ICD-10-CM

## 2019-05-30 PROCEDURE — 99213 OFFICE O/P EST LOW 20 MIN: CPT | Performed by: PODIATRIST

## 2019-05-30 NOTE — TELEPHONE ENCOUNTER
Patient son (MARIO) following up, Xarelto not being covered by insurance, PA has also been denied, see alternatives below.  Patient's son requesting alternative script as soon as possible, pt has 1 tab left and is unable to travel to pharmacy to , son

## 2019-05-30 NOTE — TELEPHONE ENCOUNTER
Informed son rx sent to the pharmacy. Per son pt is out of furosemide. Dose and directions verified.  Please advise

## 2019-05-30 NOTE — TELEPHONE ENCOUNTER
PA for Xarelto 20 mg tab completed with [employer] via CMM response time 3-5 business days [phone]

## 2019-05-30 NOTE — TELEPHONE ENCOUNTER
Switch pt to eliquis 5mg po bid #180 tabs; he can start this tomorrow since he took his xarelto today already. erx already sent to his pharmacy.

## 2019-05-31 ENCOUNTER — TELEPHONE (OUTPATIENT)
Dept: INTERNAL MEDICINE CLINIC | Facility: CLINIC | Age: 69
End: 2019-05-31

## 2019-05-31 RX ORDER — FUROSEMIDE 40 MG/1
40 TABLET ORAL 2 TIMES DAILY
Refills: 0 | Status: CANCELLED | OUTPATIENT
Start: 2019-05-31

## 2019-05-31 RX ORDER — FUROSEMIDE 40 MG/1
40 TABLET ORAL EVERY OTHER DAY
Qty: 45 TABLET | Refills: 0 | Status: SHIPPED | OUTPATIENT
Start: 2019-05-31 | End: 2019-08-26

## 2019-05-31 NOTE — TELEPHONE ENCOUNTER
Patient's son (MARIO) returned call, advised of recommendations per Dr Edinson Miguel, son verbalized understanding and agreed with plan. Advised Dr Edinson Miguel has sent over a new script to the pharmacy, repeat lab already in system.  Son verbalized understanding and agreed with

## 2019-05-31 NOTE — TELEPHONE ENCOUNTER
His bmp showed creatinine remains mildly elevated but stable. I think he is getting too dry from taking lasix. Would change dose to three times a week only instead of daily. Recheck also bmp in 4 weeks.

## 2019-05-31 NOTE — TELEPHONE ENCOUNTER
Pt's son Jose Francisco Whitley is requesting a refill for Furosemide 40mg. He reports pt was seen by [facility] on 5/28 ; Pt is s/p L hip surgery and was recently discharged from [address] [place] [address]. Pt with bilateral edema of both legs.    He has been taking Furosemide 40mg BID per rehab dc

## 2019-06-01 NOTE — TELEPHONE ENCOUNTER
Orders were signed by MD, faxed orders back to [facility], fax was confirmed.

## 2019-06-03 NOTE — PROGRESS NOTES
Nora Guallpa is a 77year old male. Patient presents with:  Consult: right foot toenails thickened -- Hx of right hip surgery for fx. Rates pain 0/10 today        HPI:   Patient is here for follow-up on his right foot thickened toenails.   I saw him init extremity    • Hyperlipidemia    • Osteoarthritis    • Scoliosis    • Unspecified essential hypertension       Past Surgical History:   Procedure Laterality Date   • ELECTROCARDIOGRAM, COMPLETE  11/19/2013    SCANNED TO MEDIA TAB - 11/19/2013   • HIP PINNI previously debrided on the left foot. On the right foot his nails are 30-40 times normal thickness with the right hallux nail being the worst it is actually starting to curve back towards the skin of his toe but it is not digging in and is not infected.

## 2019-06-06 ENCOUNTER — HOSPITAL ENCOUNTER (OUTPATIENT)
Dept: GENERAL RADIOLOGY | Facility: HOSPITAL | Age: 69
Discharge: HOME | End: 2019-06-06
Attending: ORTHOPAEDIC SURGERY

## 2019-06-06 ENCOUNTER — OFFICE VISIT (OUTPATIENT)
Dept: ORTHOPEDICS CLINIC | Facility: CLINIC | Age: 69
End: 2019-06-06
Payer: MEDICARE

## 2019-06-06 DIAGNOSIS — Z47.89 ORTHOPEDIC AFTERCARE: ICD-10-CM

## 2019-06-06 DIAGNOSIS — Z47.89 ORTHOPEDIC AFTERCARE: Primary | ICD-10-CM

## 2019-06-06 DIAGNOSIS — S72.001D CLOSED FRACTURE OF RIGHT HIP WITH ROUTINE HEALING, SUBSEQUENT ENCOUNTER: ICD-10-CM

## 2019-06-06 PROCEDURE — 73502 X-RAY EXAM HIP UNI 2-3 VIEWS: CPT | Performed by: ORTHOPAEDIC SURGERY

## 2019-06-06 PROCEDURE — 99024 POSTOP FOLLOW-UP VISIT: CPT | Performed by: ORTHOPAEDIC SURGERY

## 2019-06-06 PROCEDURE — G0463 HOSPITAL OUTPT CLINIC VISIT: HCPCS | Performed by: ORTHOPAEDIC SURGERY

## 2019-06-07 ENCOUNTER — TELEPHONE (OUTPATIENT)
Dept: INTERNAL MEDICINE CLINIC | Facility: CLINIC | Age: 69
End: 2019-06-07

## 2019-06-07 ENCOUNTER — TELEPHONE (OUTPATIENT)
Dept: ORTHOPEDICS CLINIC | Facility: CLINIC | Age: 69
End: 2019-06-07

## 2019-06-07 NOTE — TELEPHONE ENCOUNTER
TCB. Need to tell Emy, from [facility], Physical Therapy Dr. Nazario Villatoro states it's ok with him for patient to have P. T 3 x's a week for 4 weeks. Office phone number given.

## 2019-06-07 NOTE — TELEPHONE ENCOUNTER
Emy from [facility] is calling would like to request extended Physical Therapy for patient 3x a week for 4 weeks.

## 2019-06-07 NOTE — TELEPHONE ENCOUNTER
Pt called in to see if PCP give the [id number] for PT to start outside for 4 weeks    Please advise, per Pt can call anytime

## 2019-06-07 NOTE — TELEPHONE ENCOUNTER
pts son states pt is under [facility] and was given an order for PT but the pt cannot drive. pts son wants the order to be changed.  pls call thank you

## 2019-06-07 NOTE — TELEPHONE ENCOUNTER
Pt. has questions about ace bandage and sock which feels too tight for him and tight for his circulation. Pt. States that it is difficult to get the sock on. I transferred the call to RN.

## 2019-06-07 NOTE — TELEPHONE ENCOUNTER
S/w pt and he states he asked rehab about compression socks and they gave him one. He states they didn't measure his leg and it was extremely difficult to get on. It is not causing any pain, numbness or tingling and can move leg/toes fine.  He is concerned

## 2019-06-07 NOTE — TELEPHONE ENCOUNTER
Pt calling to get OK from Dr. Geena Holt to do outpatient physical therapy vs physical therapy at home with [facility]. pls advise     Per [facility] nurse she mentioned that pt's surgeon had given him an order to do out-patient PT.

## 2019-06-08 NOTE — TELEPHONE ENCOUNTER
Called pt and informed, he clarified that he will be doing PT with home health just outside his house.  He will not be doing outpatient PT.

## 2019-06-10 ENCOUNTER — TELEPHONE (OUTPATIENT)
Dept: INTERNAL MEDICINE CLINIC | Facility: CLINIC | Age: 69
End: 2019-06-10

## 2019-06-10 ENCOUNTER — TELEPHONE (OUTPATIENT)
Dept: OTHER | Age: 69
End: 2019-06-10

## 2019-06-10 DIAGNOSIS — Z87.81 S/P RIGHT HIP FRACTURE: Primary | ICD-10-CM

## 2019-06-10 NOTE — TELEPHONE ENCOUNTER
[id number] with me for [facility] to do it for pt

## 2019-06-10 NOTE — TELEPHONE ENCOUNTER
Dr. Althea Tompkins, please sign order for [facility].  To evaluate patient for assistance with ADL's( pulling on JAVID Hose)

## 2019-06-10 NOTE — TELEPHONE ENCOUNTER
The son is requesting an aide or some sort of help for the patient for he has    1)  artemio hose that need to go on in the morning and off at night. He is unable to do.   When he has compression stocking last time he did not take them off and ending having lo

## 2019-06-10 NOTE — TELEPHONE ENCOUNTER
Sorry, I dont understand message; does he have JAVID hose or not? Does he have antifungal med for his toes?

## 2019-06-10 NOTE — TELEPHONE ENCOUNTER
Son Calling and states that patient lives alone, with [facility] RN , states that patient BLE has edema and was prescribed with JAVID HOSE, and with toes fungus, patient lives alone and cannot apply and remove the JAVID HOSE as well as applying the medication for his

## 2019-06-10 NOTE — TELEPHONE ENCOUNTER
Noted in forms encounter from today, [place] order was faxed to office today, not sure if that form is in regards to this request.

## 2019-06-11 ENCOUNTER — MED REC SCAN ONLY (OUTPATIENT)
Dept: INTERNAL MEDICINE CLINIC | Facility: CLINIC | Age: 69
End: 2019-06-11

## 2019-06-17 ENCOUNTER — TELEPHONE (OUTPATIENT)
Dept: INTERNAL MEDICINE CLINIC | Facility: CLINIC | Age: 69
End: 2019-06-17

## 2019-06-17 ENCOUNTER — TELEPHONE (OUTPATIENT)
Dept: OTHER | Age: 69
End: 2019-06-17

## 2019-06-17 NOTE — TELEPHONE ENCOUNTER
Pt's son Nabor Reyna) ([employer]) is following up on the request for [facility] for his father.

## 2019-06-17 NOTE — TELEPHONE ENCOUNTER
Pt's son is following up on request for [facility] to help the pt put on his compression stockings (refer to previous encounter 06/10). States that his father cannot touch his toes and is unable to get the stockings on.   I called [facility] and was advised that

## 2019-06-19 ENCOUNTER — TELEPHONE (OUTPATIENT)
Dept: OTHER | Age: 69
End: 2019-06-19

## 2019-06-19 NOTE — TELEPHONE ENCOUNTER
Pt states he is coughing and thinks it is related to blood pressure medications. Per pt this has happened to him previously (please see 10/9/18 encounter). Pt denies fever, sore throat, earache, runny nose, congestion. Please advise.

## 2019-06-19 NOTE — TELEPHONE ENCOUNTER
Pt was inform of Dr. Ana Maria Dove message below. Pt stated that will monitor his cough and call us back if not resolved or worse.  Thanks

## 2019-06-19 NOTE — TELEPHONE ENCOUNTER
We  Already took him off bp  Med that can cause coughing before and then his coughing resolved then. He doesn't have bp meds that can cause coughing. He will need ov.

## 2019-06-27 RX ORDER — ATORVASTATIN CALCIUM 20 MG/1
TABLET, FILM COATED ORAL
Qty: 90 TABLET | Refills: 1 | Status: SHIPPED | OUTPATIENT
Start: 2019-06-27 | End: 2019-11-07

## 2019-06-27 NOTE — TELEPHONE ENCOUNTER
Refill passed per [facility] protocol.   Cholesterol Medications  Protocol Criteria:  · Appointment scheduled in the past 12 months or in the next 3 months  · ALT & LDL on file in the past 12 months  · ALT result < 80  · LDL result <130   Recent Outpat

## 2019-06-28 ENCOUNTER — TELEPHONE (OUTPATIENT)
Dept: INTERNAL MEDICINE CLINIC | Facility: CLINIC | Age: 69
End: 2019-06-28

## 2019-06-28 NOTE — TELEPHONE ENCOUNTER
Spoke with the patient who reports his cough is better, but he is still coughing. Patient reports his right leg is better. Patient wanted to know if his blood pressure medication needs to be changed due to the cough.  Patient made aware ace inhibitors tend

## 2019-06-28 NOTE — TELEPHONE ENCOUNTER
Pt req a callback to find out if his BP meds was changed or not due to just getting out rehab about 1 month ago.

## 2019-06-28 NOTE — TELEPHONE ENCOUNTER
Spoke with the patient and informed him of Dr. Jennifer Sumner orders from below. Patient voiced understanding and refused an appointment for 7/2/19 and scheduled an appointment for 7/3/19.

## 2019-07-03 ENCOUNTER — TELEPHONE (OUTPATIENT)
Dept: INTERNAL MEDICINE CLINIC | Facility: CLINIC | Age: 69
End: 2019-07-03

## 2019-07-03 ENCOUNTER — OFFICE VISIT (OUTPATIENT)
Dept: INTERNAL MEDICINE CLINIC | Facility: CLINIC | Age: 69
End: 2019-07-03
Payer: MEDICARE

## 2019-07-03 ENCOUNTER — APPOINTMENT (OUTPATIENT)
Dept: LAB | Age: 69
End: 2019-07-03
Attending: INTERNAL MEDICINE

## 2019-07-03 VITALS
TEMPERATURE: 98 F | SYSTOLIC BLOOD PRESSURE: 116 MMHG | HEIGHT: 66 IN | WEIGHT: 196 LBS | HEART RATE: 79 BPM | DIASTOLIC BLOOD PRESSURE: 67 MMHG | RESPIRATION RATE: 12 BRPM | BODY MASS INDEX: 31.5 KG/M2

## 2019-07-03 DIAGNOSIS — R05.9 COUGH: Primary | ICD-10-CM

## 2019-07-03 DIAGNOSIS — R79.89 ELEVATED SERUM CREATININE: ICD-10-CM

## 2019-07-03 LAB
ANION GAP SERPL CALC-SCNC: 7 MMOL/L (ref 0–18)
BUN BLD-MCNC: 23 MG/DL (ref 7–18)
BUN/CREAT SERPL: 18.5 (ref 10–20)
CALCIUM BLD-MCNC: 9 MG/DL (ref 8.5–10.1)
CHLORIDE SERPL-SCNC: 106 MMOL/L (ref 98–112)
CO2 SERPL-SCNC: 29 MMOL/L (ref 21–32)
CREAT BLD-MCNC: 1.24 MG/DL (ref 0.7–1.3)
GLUCOSE BLD-MCNC: 116 MG/DL (ref 70–99)
OSMOLALITY SERPL CALC.SUM OF ELEC: 299 MOSM/KG (ref 275–295)
PATIENT FASTING: NO
POTASSIUM SERPL-SCNC: 3.9 MMOL/L (ref 3.5–5.1)
SODIUM SERPL-SCNC: 142 MMOL/L (ref 136–145)

## 2019-07-03 PROCEDURE — G0463 HOSPITAL OUTPT CLINIC VISIT: HCPCS | Performed by: INTERNAL MEDICINE

## 2019-07-03 PROCEDURE — 80048 BASIC METABOLIC PNL TOTAL CA: CPT

## 2019-07-03 PROCEDURE — 36415 COLL VENOUS BLD VENIPUNCTURE: CPT

## 2019-07-03 PROCEDURE — 99214 OFFICE O/P EST MOD 30 MIN: CPT | Performed by: INTERNAL MEDICINE

## 2019-07-03 RX ORDER — CODEINE PHOSPHATE AND GUAIFENESIN 10; 100 MG/5ML; MG/5ML
5 SOLUTION ORAL EVERY 6 HOURS PRN
Qty: 180 ML | Refills: 0 | Status: SHIPPED
Start: 2019-07-03 | End: 2020-08-03 | Stop reason: ALTCHOICE

## 2019-07-03 NOTE — PROGRESS NOTES
HPI:    Patient ID: Kimberli Chatterjee is a 77year old male. Cough   This is a chronic problem. The current episode started more than 1 month ago. The problem has been unchanged. The problem occurs every few hours. The cough is non-productive.  Pertinent n EXAM:   Physical Exam   Constitutional: He is oriented to person, place, and time. He appears well-developed and well-nourished. He is cooperative. Non-toxic appearance. He does not have a sickly appearance. He does not appear ill. No distress.    HENT: prescriptions requested or ordered in this encounter       Imaging & Referrals:  None       W#[id number]

## 2019-07-03 NOTE — TELEPHONE ENCOUNTER
Jonnathan is calling to inform DR that pt will be discharged from [facility] and including nursing on the 12th.  Pt is ready to start Outpatient PT.

## 2019-07-03 NOTE — TELEPHONE ENCOUNTER
Per printed script from Dr. Waqas Harmon, refill called to [facility] for Guaifenesin-Codeine 100/10 mg/ 5 ml. , qty ! 80 ml. Take 5 ml(1 teaspoon) every 6(six) hours as needed for cough. No additional refills.

## 2019-07-05 ENCOUNTER — TELEPHONE (OUTPATIENT)
Dept: INTERNAL MEDICINE CLINIC | Facility: CLINIC | Age: 69
End: 2019-07-05

## 2019-07-05 RX ORDER — METOPROLOL TARTRATE 50 MG/1
TABLET, FILM COATED ORAL
Qty: 180 TABLET | Refills: 1 | Status: SHIPPED | OUTPATIENT
Start: 2019-07-05 | End: 2020-02-01

## 2019-07-22 ENCOUNTER — TELEPHONE (OUTPATIENT)
Dept: INTERNAL MEDICINE CLINIC | Facility: CLINIC | Age: 69
End: 2019-07-22

## 2019-07-23 NOTE — TELEPHONE ENCOUNTER
[facility]. Order # [id number] completed by Dr. Clau Munguia,  faxed back and confirmed sent. Original placed for scanning to chart.

## 2019-07-30 ENCOUNTER — OFFICE VISIT (OUTPATIENT)
Dept: PODIATRY CLINIC | Facility: CLINIC | Age: 69
End: 2019-07-30
Payer: MEDICARE

## 2019-07-30 DIAGNOSIS — L60.2 ONYCHOGRYPHOSIS: ICD-10-CM

## 2019-07-30 DIAGNOSIS — I87.2 EDEMA OF LOWER EXTREMITY DUE TO PERIPHERAL VENOUS INSUFFICIENCY: ICD-10-CM

## 2019-07-30 DIAGNOSIS — B35.1 ONYCHOMYCOSIS: Primary | ICD-10-CM

## 2019-07-30 PROCEDURE — 99213 OFFICE O/P EST LOW 20 MIN: CPT | Performed by: PODIATRIST

## 2019-08-01 NOTE — PROGRESS NOTES
Oral Collazo is a 77year old male. Patient presents with:  Lesion: right lower extremity -- Denies any pain. Not sure of drainage. Sites look dry. Son with pt.    Toenail Fungus        HPI:   Patient returns to the clinic for evaluation of his swelling hyperplasia)    • Encounter for follow-up of acute deep vein thrombosis (DVT) of right lower extremity    • Hyperlipidemia    • Osteoarthritis    • Scoliosis    • Unspecified essential hypertension       Past Surgical History:   Procedure Laterality Date distress  EXTREMITIES:   1. Integument: The skin on both feet is warm and dry.   His nails 1-5 both feet are thickened yellowed and dystrophic with subungual debris distal lysis from the nailbed and there is significant deformity in the hallux nails which a

## 2019-08-09 DIAGNOSIS — R33.9 URINARY RETENTION: ICD-10-CM

## 2019-08-09 RX ORDER — LOSARTAN POTASSIUM 50 MG/1
TABLET ORAL
Qty: 90 TABLET | Refills: 0 | OUTPATIENT
Start: 2019-08-09

## 2019-08-09 RX ORDER — AMLODIPINE BESYLATE 5 MG/1
TABLET ORAL
Qty: 90 TABLET | Refills: 1 | Status: SHIPPED | OUTPATIENT
Start: 2019-08-09 | End: 2020-02-01

## 2019-08-09 NOTE — TELEPHONE ENCOUNTER
Refill passed per [facility] protocol.   Hypertensive Medications  Protocol Criteria:  · Appointment scheduled in the past 6 months or in the next 3 months  · BMP or CMP in the past 12 months  · Creatinine result < 2  Recent Outpatient Visits

## 2019-08-09 NOTE — TELEPHONE ENCOUNTER
RN please call pt and verify is still taking Losartan and was just removed from med lists at [address] [place] [address] when discharged:    LOSARTAN POTASSIUM 50 MG Oral Tab (Discontinued) 90 tablet 0 3/7/2019 4/19/2019    Sig: TAKE 1 TABLET(50 MG) BY MOUTH DAILY    Sent to phar

## 2019-08-13 RX ORDER — TAMSULOSIN HYDROCHLORIDE 0.4 MG/1
0.8 CAPSULE ORAL DAILY
Qty: 180 CAPSULE | Refills: 3 | Status: SHIPPED | OUTPATIENT
Start: 2019-08-13

## 2019-08-13 NOTE — TELEPHONE ENCOUNTER
Dr. Anish Beach pt LOV 5/14/19 pt contacting us through my chart for refill on tamsulosin if you agree please review and sign med. I copied and pasted part of your last note below. Joan Pavan  BPH and history of Urinary Retention  - Patient was admitted to [address] [place] [address] in

## 2019-08-21 RX ORDER — APIXABAN 5 MG/1
TABLET, FILM COATED ORAL
Qty: 180 TABLET | Refills: 0 | Status: SHIPPED | OUTPATIENT
Start: 2019-08-21 | End: 2019-11-21

## 2019-08-21 RX ORDER — FAMOTIDINE 20 MG/1
TABLET, FILM COATED ORAL
Qty: 180 TABLET | Refills: 1 | Status: SHIPPED | OUTPATIENT
Start: 2019-08-21 | End: 2020-02-22

## 2019-08-23 RX ORDER — APIXABAN 5 MG/1
TABLET, FILM COATED ORAL
Qty: 180 TABLET | Refills: 0 | OUTPATIENT
Start: 2019-08-23

## 2019-08-23 NOTE — TELEPHONE ENCOUNTER
Duplicate request, previously addressed.     [facility][id number] [address], [phone], [phone]   Outpatient Medication Detail      Disp Refills Start End    ELIQUIS 5 MG Oral Tab 180 tablet 0 8/21/2019     Sig: TAKE 1 TABLET(5 MG) BY MOUTH TWICE DAILY    Sent to pharmacy as: Eliquis 5 MG Oral Tablet    E-Prescribing Status: Receipt confirmed by pharmacy (8/21/2019 11:07 PM CDT)      Rx already sent

## 2019-08-27 RX ORDER — FUROSEMIDE 40 MG/1
TABLET ORAL
Qty: 45 TABLET | Refills: 1 | Status: SHIPPED | OUTPATIENT
Start: 2019-08-27 | End: 2020-02-01

## 2019-08-28 NOTE — TELEPHONE ENCOUNTER
Refill passed per [facility] protocol.   Hypertensive Medications  Protocol Criteria:  · Appointment scheduled in the past 6 months or in the next 3 months  · BMP or CMP in the past 12 months  · Creatinine result < 2  Recent Outpatient Visits 07/03/2019    CA 9.0 07/03/2019    AST 20 05/20/2019    ALT 20 05/20/2019    BILT 0.7 05/20/2019    TP 7.7 05/20/2019    ALB 3.0 (L) 05/20/2019     07/03/2019    K 3.9 07/03/2019     07/03/2019    CO2 29.0 07/03/2019    GLOBULIN 4.7 (H) 05/20/2

## 2019-09-13 ENCOUNTER — NURSE TRIAGE (OUTPATIENT)
Dept: INTERNAL MEDICINE CLINIC | Facility: CLINIC | Age: 69
End: 2019-09-13

## 2019-09-13 NOTE — TELEPHONE ENCOUNTER
Action Requested: Summary for Provider     []  Critical Lab, Recommendations Needed  [] Need Additional Advice  [x]   FYI    []   Need Orders  [] Need Medications Sent to Pharmacy  []  Other     SUMMARY: Eddie Black requesting appt on Monday with ARNULFO for \"

## 2019-09-14 ENCOUNTER — HOSPITAL ENCOUNTER (EMERGENCY)
Facility: HOSPITAL | Age: 69
Discharge: HOME | End: 2019-09-14
Attending: EMERGENCY MEDICINE

## 2019-09-14 VITALS
BODY MASS INDEX: 30.53 KG/M2 | SYSTOLIC BLOOD PRESSURE: 138 MMHG | TEMPERATURE: 98 F | WEIGHT: 190 LBS | DIASTOLIC BLOOD PRESSURE: 84 MMHG | RESPIRATION RATE: 20 BRPM | HEART RATE: 79 BPM | HEIGHT: 66 IN | OXYGEN SATURATION: 94 %

## 2019-09-14 DIAGNOSIS — I87.2 VENOUS STASIS DERMATITIS OF BOTH LOWER EXTREMITIES: ICD-10-CM

## 2019-09-14 DIAGNOSIS — I87.2 VENOUS INSUFFICIENCY (CHRONIC) (PERIPHERAL): Primary | ICD-10-CM

## 2019-09-14 PROCEDURE — 99283 EMERGENCY DEPT VISIT LOW MDM: CPT

## 2019-09-14 NOTE — ED INITIAL ASSESSMENT (HPI)
Patient complain of left leg wound. Patient states it needs to be cleaned and needs medication for it.

## 2019-09-14 NOTE — CM/SW NOTE
met with patient and son at bedside    Per Dr Dewayne Tay schedule patient for the lymphedema clinic    Order placed for outpatient lymphedema clinic    On Monday 9/16 CM team will notify patient of clinic schedule    Patient and son verbalized under

## 2019-09-16 ENCOUNTER — TELEPHONE (OUTPATIENT)
Dept: PHYSICAL THERAPY | Facility: HOSPITAL | Age: 69
End: 2019-09-16

## 2019-09-16 NOTE — TELEPHONE ENCOUNTER
Notified by Nohemi (ED case manager) that this patient was seen in the ED for LE swelling and needs to schedule for lymphedema outpt therapy. Attempted to call pt- no answer. Left voicemail message for pt to call back to schedule.

## 2019-09-16 NOTE — ED PROVIDER NOTES
Patient presents with:  Rash Skin Problem (integumentary)    Stated Complaint: wound to L calf    HPI  Patient complains of skin rash on both legs for months. Has chronic swelling with known dvt on eliquis.   Had r hip fracture repair 4 months ago and has tablet (5 mg total) by mouth daily. HYDROcodone-acetaminophen 5-325 MG Oral Tab,  Take 1 tablet by mouth every 4 (four) hours as needed.        Family History   Problem Relation Age of Onset   • Heart Disease Father    • Cancer Father    • Hypertension Fa both lower extremities    Disposition:  Discharge    Follow-up:  Lev Sagastume MD  [address] [facility] [address]  [facility] [address]  [phone]            Medications Prescribed:  Discharge Medication List as of 9/14/2019  4:01 PM    START taking t

## 2019-09-16 NOTE — CM/SW NOTE
Called Lymphedema Clinic @ X: [phone] and left detailed message informing them to please call ERCM back as we need to schedule appointment per Dr. Ebenezer Hwang for patient to see lymphedema clinic, also informed them an order was placed and we will notify patient of

## 2019-09-16 NOTE — TELEPHONE ENCOUNTER
Patient seen in ED 9/14/19  Routed to CC for follow up appt    Disposition and Plan    Clinical Impression:  Venous insufficiency (chronic) (peripheral)  (primary encounter diagnosis)  Venous stasis dermatitis of both lower extremities     Disposition:  Darrius Gomez

## 2019-09-16 NOTE — CM/SW NOTE
Meme Roe from [facility] called this RN - she will contact patient to confirm he is not currently receiving any other therapy because per Meme Roe if patient is they have to schedule lymphedema appt on alternate days of other therapy's per Medicare's guid

## 2019-09-23 NOTE — TELEPHONE ENCOUNTER
Appointment Information   Name: Duane Banegas MRN: [id number]   Date: 9/26/2019 Status: Brayan   Appt Time: 2:30 PM Length: 15   Visit Type: EXAM - ESTABLISHED [8874] Copay: $0.00   Provider: Shiv Montero MD Department: [facility]

## 2019-09-27 ENCOUNTER — TELEPHONE (OUTPATIENT)
Dept: INTERNAL MEDICINE CLINIC | Facility: CLINIC | Age: 69
End: 2019-09-27

## 2019-09-27 NOTE — TELEPHONE ENCOUNTER
Per patient his right leg is in pain, patient states that his right leg had an operation April 16, 2019, patient took 2 pills Acetaminophen 650mg and he feels better. Patient would like to know if that is okay.

## 2019-09-27 NOTE — TELEPHONE ENCOUNTER
Advised patient to follow dosing instructions on the medication and keep icing and elevating his leg.

## 2019-10-03 ENCOUNTER — OFFICE VISIT (OUTPATIENT)
Dept: INTERNAL MEDICINE CLINIC | Facility: CLINIC | Age: 69
End: 2019-10-03
Payer: MEDICARE

## 2019-10-03 VITALS
BODY MASS INDEX: 32.78 KG/M2 | HEART RATE: 77 BPM | WEIGHT: 204 LBS | SYSTOLIC BLOOD PRESSURE: 133 MMHG | TEMPERATURE: 98 F | DIASTOLIC BLOOD PRESSURE: 71 MMHG | HEIGHT: 66 IN

## 2019-10-03 DIAGNOSIS — I87.2 VENOUS INSUFFICIENCY (CHRONIC) (PERIPHERAL): Primary | ICD-10-CM

## 2019-10-03 DIAGNOSIS — Z86.718 HISTORY OF DEEP VEIN THROMBOSIS (DVT) OF LOWER EXTREMITY: ICD-10-CM

## 2019-10-03 DIAGNOSIS — Z86.73 HISTORY OF CVA (CEREBROVASCULAR ACCIDENT): ICD-10-CM

## 2019-10-03 PROCEDURE — G0463 HOSPITAL OUTPT CLINIC VISIT: HCPCS | Performed by: INTERNAL MEDICINE

## 2019-10-03 PROCEDURE — 99214 OFFICE O/P EST MOD 30 MIN: CPT | Performed by: INTERNAL MEDICINE

## 2019-10-06 PROBLEM — Z86.718 HISTORY OF DEEP VEIN THROMBOSIS (DVT) OF LOWER EXTREMITY: Status: ACTIVE | Noted: 2019-10-06

## 2019-10-06 NOTE — PROGRESS NOTES
HPI:    Patient ID: [place]y [place] is a 77year old male. Swelling   This is a chronic (pt here for ER ffup, was seen for recurrent/worsening bilateral leg edema) problem. The current episode started more than 1 year ago. The problem occurs constantly. MOUTH TWICE DAILY Disp: 180 tablet Rfl: 1   ATORVASTATIN 20 MG Oral Tab TAKE 1 TABLET(20 MG) BY MOUTH EVERY NIGHT Disp: 90 tablet Rfl: 1   finasteride 5 MG Oral Tab Take 1 tablet (5 mg total) by mouth daily.  Disp: 90 tablet Rfl: 0   HYDROcodone-acetaminoph JAVID key so I told him to swtich to tubigrip which is lot easier to put on and more likely at least pt will use it.   We also discussed about having to see vascular surgeon for eval.     (V23.304) History of deep vein thrombosis (DVT) of lower extremity  Pl

## 2019-10-11 ENCOUNTER — TELEPHONE (OUTPATIENT)
Dept: INTERNAL MEDICINE CLINIC | Facility: CLINIC | Age: 69
End: 2019-10-11

## 2019-10-11 NOTE — TELEPHONE ENCOUNTER
Pt states last OV Dr. Zain Calle suggested Tubi  stockings. Questioning how long he should be wearing them? Please advise.     Please respond to pool: CHERYL GARCIAO VAMSIN/SHAKA

## 2019-10-12 ENCOUNTER — NURSE TRIAGE (OUTPATIENT)
Dept: INTERNAL MEDICINE CLINIC | Facility: CLINIC | Age: 69
End: 2019-10-12

## 2019-10-12 RX ORDER — CYCLOBENZAPRINE HCL 10 MG
10 TABLET ORAL 3 TIMES DAILY
Qty: 30 TABLET | Refills: 0 | Status: SHIPPED | OUTPATIENT
Start: 2019-10-12 | End: 2019-11-01

## 2019-10-12 NOTE — TELEPHONE ENCOUNTER
Patient is requesting to speak to nurse regarding his sciatic nerve pain. He states he needs some advise.     Transfer to Triage

## 2019-10-12 NOTE — TELEPHONE ENCOUNTER
Patient returned call, informed him that medication was sent to his pharmacy. Reviewed Cyclobenzaprine's name, strength, sig and precautions with drowsiness with patient. Patient verbalized understanding and had no further questions.      Also reviewed venecia

## 2019-10-12 NOTE — TELEPHONE ENCOUNTER
Please call pt. I sent a muscle relaxant. He should not drive after taking it because it can cause drowsiness.

## 2019-10-12 NOTE — TELEPHONE ENCOUNTER
Action Requested: Summary for Provider     []  Critical Lab, Recommendations Needed  [] Need Additional Advice  []   FYI    []   Need Orders  [] Need Medications Sent to Pharmacy  []  Other     SUMMARY: Right buttock pain that radiate down the right leg to

## 2019-10-14 ENCOUNTER — TELEPHONE (OUTPATIENT)
Dept: INTERNAL MEDICINE CLINIC | Facility: CLINIC | Age: 69
End: 2019-10-14

## 2019-10-14 NOTE — TELEPHONE ENCOUNTER
Prior authorization for Cyclobenzaprine HCl 10MG oral tab completed w/ [employer] on cover my meds OGR:[id number], turn around time 24-72 hrs.     Patient notified via [address] [place] [address] [address]

## 2019-10-14 NOTE — TELEPHONE ENCOUNTER
Pt stated his brother told him he just need the tubi  sock from ankle to thigh, advised should cover foot to thigh, has appt tomorrow , will discuss further

## 2019-10-14 NOTE — TELEPHONE ENCOUNTER
Patient would like to speak with a nurse in regards to tubular sock (tubigrip). Please call patient back. Thank you.

## 2019-10-14 NOTE — TELEPHONE ENCOUNTER
Cyclobenzaprine HCl 10 MG Oral Tab, Take 1 tablet (10 mg total) by mouth 3 (three) times daily for 20 days. CAUSES DROWSINESS., Disp: 30 tablet, Rfl: 0    Go to key. [employer]  Key:  [id number] with patient's last name and

## 2019-10-15 ENCOUNTER — OFFICE VISIT (OUTPATIENT)
Dept: INTERNAL MEDICINE CLINIC | Facility: CLINIC | Age: 69
End: 2019-10-15
Payer: MEDICARE

## 2019-10-15 VITALS
HEART RATE: 80 BPM | RESPIRATION RATE: 12 BRPM | WEIGHT: 206 LBS | TEMPERATURE: 98 F | SYSTOLIC BLOOD PRESSURE: 135 MMHG | BODY MASS INDEX: 35.17 KG/M2 | DIASTOLIC BLOOD PRESSURE: 74 MMHG | HEIGHT: 64 IN

## 2019-10-15 DIAGNOSIS — I87.2 VENOUS INSUFFICIENCY (CHRONIC) (PERIPHERAL): ICD-10-CM

## 2019-10-15 DIAGNOSIS — M54.31 SCIATICA OF RIGHT SIDE: Primary | ICD-10-CM

## 2019-10-15 DIAGNOSIS — B35.3 TINEA PEDIS OF BOTH FEET: ICD-10-CM

## 2019-10-15 DIAGNOSIS — B35.1 ONYCHOMYCOSIS: ICD-10-CM

## 2019-10-15 PROCEDURE — G0463 HOSPITAL OUTPT CLINIC VISIT: HCPCS | Performed by: INTERNAL MEDICINE

## 2019-10-15 PROCEDURE — 99213 OFFICE O/P EST LOW 20 MIN: CPT | Performed by: INTERNAL MEDICINE

## 2019-10-15 RX ORDER — KETOCONAZOLE 20 MG/G
1 CREAM TOPICAL DAILY
Qty: 60 G | Refills: 0 | Status: SHIPPED | OUTPATIENT
Start: 2019-10-15 | End: 2020-08-03 | Stop reason: ALTCHOICE

## 2019-10-15 RX ORDER — CEPHALEXIN 500 MG/1
500 CAPSULE ORAL 2 TIMES DAILY
Qty: 14 CAPSULE | Refills: 0 | Status: SHIPPED | OUTPATIENT
Start: 2019-10-15 | End: 2020-08-03 | Stop reason: ALTCHOICE

## 2019-10-15 NOTE — PROGRESS NOTES
HPI:    Patient ID: Sherrie Cummings is a 77year old male. Low Back Pain   This is a new problem. The current episode started more than 1 month ago. The problem occurs constantly. The problem has been resolved since onset.  The pain is present in the glu 50 MG Oral Tab, TAKE 1 TABLET(50 MG) BY MOUTH TWICE DAILY, Disp: 180 tablet, Rfl: 1  guaiFENesin-codeine (CHERATUSSIN AC) 100-10 MG/5ML Oral Solution, Take 5 mL by mouth every 6 (six) hours as needed for cough. , Disp: 180 mL, Rfl: 0  ATORVASTATIN 20 MG Ora to apply ketoconazole cream bid. Keep feet aerated.     (B35.1) Onychomycosis  Plan: pt already been given penlac nail solution by podiatrist.         No orders of the defined types were placed in this encounter.       Meds This Visit:  [address]

## 2019-10-15 NOTE — TELEPHONE ENCOUNTER
Message left on pharmacy VM indicating Cyclobenzaprine has been approved. Granted date 07/16/2019-10/14/2020.

## 2019-10-18 ENCOUNTER — TELEPHONE (OUTPATIENT)
Dept: INTERNAL MEDICINE CLINIC | Facility: CLINIC | Age: 69
End: 2019-10-18

## 2019-10-18 NOTE — TELEPHONE ENCOUNTER
Patient would like to speak with the RN to clarify which medication is for his legs and with is for his toes. Patient as prescribed   Silver sulfadiazine  Ketoconalole    Patient needs clarification asap.

## 2019-10-18 NOTE — TELEPHONE ENCOUNTER
Patient advised of directions for Silver Sulf 1%cream and Ketoconazole per instructions from OV notes 10/15/19.  Please clarify frequency, Ketoconazole noted BID at OV for feet, script to pharmacy is daily, and Silver cream script sent to pharmacy for use t

## 2019-10-21 ENCOUNTER — TELEPHONE (OUTPATIENT)
Dept: INTERNAL MEDICINE CLINIC | Facility: CLINIC | Age: 69
End: 2019-10-21

## 2019-10-21 NOTE — TELEPHONE ENCOUNTER
Called this number to find out which home health agency message is from. Chavo Martinez states [facility] She states she will fax the order for visit to us. Fax # given to [facility].

## 2019-10-21 NOTE — TELEPHONE ENCOUNTER
[facility] is requesting order script for skilled nursing for patients black spots on feet and legs. Fax : [phone] a copy of 10/15 notes please.

## 2019-10-24 NOTE — TELEPHONE ENCOUNTER
Ginna Kirk from [facility] is calling wanting to know if Dr. Mari Martínezed the faxed and if he is going to sign it since patient is already calling [facility] for services. Please faxed over October 15th office notes.    Fax number i

## 2019-10-30 ENCOUNTER — TELEPHONE (OUTPATIENT)
Dept: INTERNAL MEDICINE CLINIC | Facility: CLINIC | Age: 69
End: 2019-10-30

## 2019-10-30 NOTE — TELEPHONE ENCOUNTER
Geeta Begum 122 called to advise patient will be discharged from home health because patient does not meet home bound status. Visits have been missed becasue patient is out driving.

## 2019-11-09 RX ORDER — FINASTERIDE 5 MG/1
TABLET, FILM COATED ORAL
Qty: 90 TABLET | Refills: 0 | Status: SHIPPED | OUTPATIENT
Start: 2019-11-09 | End: 2020-02-01

## 2019-11-09 RX ORDER — ATORVASTATIN CALCIUM 20 MG/1
TABLET, FILM COATED ORAL
Qty: 90 TABLET | Refills: 0 | Status: SHIPPED | OUTPATIENT
Start: 2019-11-09 | End: 2020-02-05

## 2019-11-09 NOTE — TELEPHONE ENCOUNTER
Review pended refill request as it does not fall under a protocol.     Last Rx: 5/28/19  LOV: Recent Visits  Date Type Provider Dept   10/15/19 Office Visit Jordan Jerez MD [facility]-Internal Med   10/03/19 Office Visit MD Asmita Horta

## 2019-11-09 NOTE — TELEPHONE ENCOUNTER
Cholesterol Medications  Refill passed per [facility] protocol.     Protocol Criteria:  · Appointment scheduled in the past 12 months or in the next 3 months  · ALT & LDL on file in the past 12 months  · ALT result < 80  · LDL result <130   Recent Outp

## 2019-11-23 RX ORDER — APIXABAN 5 MG/1
TABLET, FILM COATED ORAL
Qty: 180 TABLET | Refills: 0 | Status: SHIPPED | OUTPATIENT
Start: 2019-11-23 | End: 2020-02-06

## 2019-11-25 ENCOUNTER — TELEPHONE (OUTPATIENT)
Dept: INTERNAL MEDICINE CLINIC | Facility: CLINIC | Age: 69
End: 2019-11-25

## 2019-11-25 NOTE — TELEPHONE ENCOUNTER
Spoke to Saeid Burch who is requesting referral for PT. Patient states he had surgery in April and needs PT. PT orders were generated by Ortho Dr. Jj Ulloa. Phone # given to Saeid Burch.

## 2019-12-02 ENCOUNTER — TELEPHONE (OUTPATIENT)
Dept: INTERNAL MEDICINE CLINIC | Facility: CLINIC | Age: 69
End: 2019-12-02

## 2019-12-02 NOTE — TELEPHONE ENCOUNTER
Medicare Initial Evaluation report signed by Dr. Mariel Solorzano, faxed back and confirmed sent. Original placed for scanning to chart.

## 2019-12-31 NOTE — TELEPHONE ENCOUNTER
Refill passed per [facility] protocol. However, rx on d/c list. pls review if refill appropriate? Routed to on call (Dr. Davonna Schirmer) per Dr. Nando Gastelum out of office instructions.     Requested Prescriptions   Pending Prescriptions Disp Refills   • Grace

## 2019-12-31 NOTE — TELEPHONE ENCOUNTER
Does insurance cover this? It's typically OTC. I would recommend against daily use of laxatives and if needing to use daily should be evaluated as to why.

## 2020-01-03 ENCOUNTER — TELEPHONE (OUTPATIENT)
Dept: INTERNAL MEDICINE CLINIC | Facility: CLINIC | Age: 70
End: 2020-01-03

## 2020-01-03 NOTE — TELEPHONE ENCOUNTER
Physical Therapy Plan of Care, date of service: 12/16/19 received and placed in Dr. Leobardo Fabry in basket to sign.

## 2020-01-04 NOTE — TELEPHONE ENCOUNTER
Reviewed refill request and doctor's recommendations as noted below. Pt states he is having bowel movements every 2 to 3 days. Pt states he doesn't eat fruits and vegetables and has been taking Sennokot. Med last prescribed 4/16/19 by ortho.  Pt asking rosalind

## 2020-01-07 ENCOUNTER — TELEPHONE (OUTPATIENT)
Dept: INTERNAL MEDICINE CLINIC | Facility: CLINIC | Age: 70
End: 2020-01-07

## 2020-01-07 RX ORDER — SENNOSIDES 8.6 MG
8.6 TABLET ORAL 2 TIMES DAILY
Qty: 90 TABLET | Refills: 1 | Status: SHIPPED | OUTPATIENT
Start: 2020-01-07 | End: 2020-07-10

## 2020-01-07 NOTE — TELEPHONE ENCOUNTER
Patient state that the pharmacy no long has senokot mediation in stock. Pt would like to know if the doctor would prescribe another medication to him. Pt is not sure what this medication is for.

## 2020-01-07 NOTE — TELEPHONE ENCOUNTER
Per pharmacy, [facility] no longer has Sennosides 15 mg chewable tablets. Senna 8.6 mg tablet usually preferred alternative but may not be covered by insurance. New order pended or would you like to have patient purchase OTC.

## 2020-01-08 NOTE — TELEPHONE ENCOUNTER
Pt informed of Dr Ash Pollock message below. Pt voiced understanding and agrees. Pt also mentioned that his left leg (not operated) is swollen , he wears compression socks on both legs. He wants to know why it's swollen.  He denies SOB., chest pain, or

## 2020-01-13 ENCOUNTER — TELEPHONE (OUTPATIENT)
Dept: INTERNAL MEDICINE CLINIC | Facility: CLINIC | Age: 70
End: 2020-01-13

## 2020-01-13 NOTE — TELEPHONE ENCOUNTER
Pharmacy called in requesting an alternative medication to below. Patient informed pharmacy that medicatin below is too expensive for them. Please advise.        Current Outpatient Medications:   •  ELIQUIS 5 MG Oral Tab, TAKE 1 TABLET(5 MG) BY MOUTH TW

## 2020-01-13 NOTE — TELEPHONE ENCOUNTER
Patient calling again to check the status on his medication he states the pharmacy say his medication will be $400.00      ELIQUIS 5 MG Oral Tab    Please advise

## 2020-01-13 NOTE — TELEPHONE ENCOUNTER
Advised patient on Dr. Steffanie Byers information and recommendations.  Advised patient to ask pharmacy with his insurance about anticoagulants, best for out of pocket for him, whether 30-day vs 90-day is better cost, whether local vs mail-order pharmacy is b

## 2020-01-14 NOTE — TELEPHONE ENCOUNTER
Spoke with patient and advised to him to contact his insurance to determine what the cost of Eliquis and Xarelto would be. Informed patient to call us back with the information.

## 2020-01-22 RX ORDER — FINASTERIDE 5 MG/1
TABLET, FILM COATED ORAL
Qty: 90 TABLET | Refills: 1 | OUTPATIENT
Start: 2020-01-22

## 2020-01-23 ENCOUNTER — OFFICE VISIT (OUTPATIENT)
Dept: INTERNAL MEDICINE CLINIC | Facility: CLINIC | Age: 70
End: 2020-01-23
Payer: MEDICARE

## 2020-01-23 VITALS
SYSTOLIC BLOOD PRESSURE: 145 MMHG | DIASTOLIC BLOOD PRESSURE: 69 MMHG | WEIGHT: 220.19 LBS | HEART RATE: 76 BPM | BODY MASS INDEX: 36 KG/M2 | TEMPERATURE: 98 F

## 2020-01-23 DIAGNOSIS — I89.0 LYMPHEDEMA OF BOTH LOWER EXTREMITIES: Primary | ICD-10-CM

## 2020-01-23 DIAGNOSIS — R05.3 CHRONIC COUGH: ICD-10-CM

## 2020-01-23 DIAGNOSIS — Z86.718 HISTORY OF DVT (DEEP VEIN THROMBOSIS): ICD-10-CM

## 2020-01-23 PROCEDURE — 99214 OFFICE O/P EST MOD 30 MIN: CPT | Performed by: INTERNAL MEDICINE

## 2020-01-23 PROCEDURE — G0463 HOSPITAL OUTPT CLINIC VISIT: HCPCS | Performed by: INTERNAL MEDICINE

## 2020-01-26 NOTE — PROGRESS NOTES
HPI:    Patient ID: Khushboo Urrutia is a 72year old male. Swelling   This is a chronic problem. The current episode started more than 1 year ago. The problem occurs constantly. The problem has been unchanged. Associated symptoms include coughing.  Jethro Oral Tab TAKE 1 TABLET(40 MG) BY MOUTH EVERY OTHER DAY 45 tablet 1   • FAMOTIDINE 20 MG Oral Tab TAKE 1 TABLET BY MOUTH TWICE DAILY FOR GERD 180 tablet 1   • AMLODIPINE BESYLATE 5 MG Oral Tab TAKE 1 TABLET(5 MG) BY MOUTH DAILY 90 tablet 1   • MULTIPLE ACACIA distension and no mass. There is no tenderness. There is no rebound and no guarding. Musculoskeletal:         General: Swelling and edema present. Comments:  Both lower extremities are edematous; skin on both legs are very dry and thickened     Lymphad

## 2020-01-26 NOTE — TELEPHONE ENCOUNTER
Review pended refill request as it does not fall under a protocol.     Last Rx: 10/15/2019  LOV: 3 days ago

## 2020-01-31 DIAGNOSIS — Z12.5 PROSTATE CANCER SCREENING: ICD-10-CM

## 2020-01-31 DIAGNOSIS — E78.00 PURE HYPERCHOLESTEROLEMIA: Primary | ICD-10-CM

## 2020-01-31 DIAGNOSIS — R73.03 PREDIABETES: ICD-10-CM

## 2020-02-01 RX ORDER — METOPROLOL TARTRATE 50 MG/1
TABLET, FILM COATED ORAL
Qty: 180 TABLET | Refills: 1 | Status: SHIPPED | OUTPATIENT
Start: 2020-02-01 | End: 2020-08-03

## 2020-02-01 RX ORDER — FUROSEMIDE 40 MG/1
TABLET ORAL
Qty: 45 TABLET | Refills: 1 | Status: SHIPPED | OUTPATIENT
Start: 2020-02-01 | End: 2020-04-14

## 2020-02-01 RX ORDER — AMLODIPINE BESYLATE 5 MG/1
TABLET ORAL
Qty: 90 TABLET | Refills: 1 | Status: SHIPPED | OUTPATIENT
Start: 2020-02-01 | End: 2020-08-03

## 2020-02-02 RX ORDER — FINASTERIDE 5 MG/1
TABLET, FILM COATED ORAL
Qty: 30 TABLET | Refills: 0 | Status: SHIPPED | OUTPATIENT
Start: 2020-02-02 | End: 2020-03-06

## 2020-02-05 RX ORDER — ATORVASTATIN CALCIUM 20 MG/1
TABLET, FILM COATED ORAL
Qty: 90 TABLET | Refills: 1 | Status: SHIPPED | OUTPATIENT
Start: 2020-02-05 | End: 2020-08-03

## 2020-02-05 NOTE — TELEPHONE ENCOUNTER
I had not refilled this since pt doesn't have any leg ulcers anymore. He needs to use otc moisturizing lotion like eucerin to apply to his legs for skin dryness.

## 2020-02-06 ENCOUNTER — OFFICE VISIT (OUTPATIENT)
Dept: HEMATOLOGY/ONCOLOGY | Facility: HOSPITAL | Age: 70
End: 2020-02-06
Attending: INTERNAL MEDICINE
Payer: MEDICARE

## 2020-02-06 ENCOUNTER — TELEPHONE (OUTPATIENT)
Dept: INTERNAL MEDICINE CLINIC | Facility: CLINIC | Age: 70
End: 2020-02-06

## 2020-02-06 VITALS
DIASTOLIC BLOOD PRESSURE: 68 MMHG | WEIGHT: 221.13 LBS | SYSTOLIC BLOOD PRESSURE: 149 MMHG | OXYGEN SATURATION: 94 % | HEART RATE: 77 BPM | BODY MASS INDEX: 36 KG/M2 | RESPIRATION RATE: 16 BRPM | TEMPERATURE: 98 F

## 2020-02-06 DIAGNOSIS — I87.009 POST-THROMBOTIC SYNDROME: ICD-10-CM

## 2020-02-06 DIAGNOSIS — I87.8 VENOUS STASIS: ICD-10-CM

## 2020-02-06 DIAGNOSIS — I82.401 DEEP VEIN THROMBOSIS (DVT) OF RIGHT LOWER EXTREMITY, UNSPECIFIED CHRONICITY, UNSPECIFIED VEIN (HCC): Primary | ICD-10-CM

## 2020-02-06 PROCEDURE — 99214 OFFICE O/P EST MOD 30 MIN: CPT | Performed by: INTERNAL MEDICINE

## 2020-02-06 NOTE — TELEPHONE ENCOUNTER
Called patient, mailbox full. Unable to leave voice mail. Sent patient mychart message informing to call our office back.

## 2020-02-06 NOTE — TELEPHONE ENCOUNTER
Refill passed per [facility] protocol.   Cholesterol Medications  Protocol Criteria:  · Appointment scheduled in the past 12 months or in the next 3 months  · ALT & LDL on file in the past 12 months  · ALT result < 80  · LDL result <130   Recent Outpat

## 2020-02-06 NOTE — TELEPHONE ENCOUNTER
Outpatient Medication Detail      Disp Refills Start End    ELIQUIS 5 MG Oral Tab 180 tablet 0 11/23/2019     Sig: TAKE 1 TABLET(5 MG) BY MOUTH TWICE DAILY    Sent to pharmacy as: Eliquis 5 MG Oral Tablet (apixaban)    E-Prescribing Status: Receipt confirm

## 2020-02-06 NOTE — TELEPHONE ENCOUNTER
He may stop his eliquis now since he had been on it for 6mos at least since May or June last year for DVT of leg. Have pt ffup with me in 4 weeks.

## 2020-02-07 NOTE — CONSULTS
[facility]    PATIENT'S NAME: Maddy Dyer [facility] PHYSICIAN: Tae Freeman.  Ramon Thomas MD   PATIENT ACCOUNT #: [de-identified] LOCATION: [address] [place] [address] RECORD #: [id number] YOB: 1950   CONSULTATION DATE: 02/06/2020       CANCER BENJAMIN Oropharynx clear. NECK:  Supple. LUNGS:  Symmetric expansion. Nonlabored breathing. HEART:  Good distal pulses. Regular rate and rhythm. ABDOMEN:  Soft, nontender, nondistended. No masses. No organomegaly.   EXTREMITIES:  Pitting lower extremity ed

## 2020-02-07 NOTE — TELEPHONE ENCOUNTER
Pt calling back has been given message from Dr. Andrei Araya to stop eliquis medication and to schedule f/u appt. Pt states had OV with Dr. Efraín Corado 2/6/20 and was given new script of Eliquis but a decreased dose; copied from [facility] visit.     Authorizing

## 2020-02-07 NOTE — TELEPHONE ENCOUNTER
I didn't see DR Patricio's note yesterday that He wanted to keep pt on lower dose of eliquis.  Pt also didn't tell us yesterday that he was already advised by Dr Gilford Gauze to take lower dose of eliquis  Pt then needs to take the lower dose of eliquis prescribed by DEVONTE

## 2020-02-08 NOTE — TELEPHONE ENCOUNTER
Spoke with patient, states that he received a robot call from his pharmacy that medication is ready for  and it was ordered by different provider.

## 2020-02-12 ENCOUNTER — TELEPHONE (OUTPATIENT)
Dept: INTERNAL MEDICINE CLINIC | Facility: CLINIC | Age: 70
End: 2020-02-12

## 2020-02-12 NOTE — TELEPHONE ENCOUNTER
Physical Therapy Medicare Progress form completed by Dr Gaby Garcia faxed 2/11/20 with failed attempt. Resent today with positive confirmation received.

## 2020-02-21 ENCOUNTER — TELEPHONE (OUTPATIENT)
Dept: INTERNAL MEDICINE CLINIC | Facility: CLINIC | Age: 70
End: 2020-02-21

## 2020-02-21 NOTE — TELEPHONE ENCOUNTER
Spoke with patient relayed Dr. Idania Hutton message patient voiced understanding. No further questions at this time.

## 2020-02-21 NOTE — TELEPHONE ENCOUNTER
Patient has medication questions if he can use Eucerin over the counter cream \"original healing cream extremely dry\" for his foot and leg or should he use \"Silver Sulfate USP 1% cream\"

## 2020-02-22 RX ORDER — FAMOTIDINE 20 MG/1
TABLET, FILM COATED ORAL
Qty: 180 TABLET | Refills: 1 | Status: SHIPPED | OUTPATIENT
Start: 2020-02-22 | End: 2020-08-17

## 2020-03-03 ENCOUNTER — TELEPHONE (OUTPATIENT)
Dept: INTERNAL MEDICINE CLINIC | Facility: CLINIC | Age: 70
End: 2020-03-03

## 2020-03-03 NOTE — TELEPHONE ENCOUNTER
This cream silvadene was only for his skin/vein ulcers before. For dry skin he needs a good moisturizing lotion as advised before. He can use cerave or eucerin cream applied daily after he is cleaned. He needs good hygiene which is part of pt's problem.  I

## 2020-03-03 NOTE — TELEPHONE ENCOUNTER
[facility] Medicare Progress notes signed by Wong Wells, faxed back and confirmed sent. Original placed for scanning to chart.

## 2020-03-03 NOTE — TELEPHONE ENCOUNTER
Spoke with son Brian Lundberg ( and MARIO verified)--asking, \"Does Dr. Gan Mention think my dad needs Silver Sulfadiazine for his legs? He didn't use it that often in the past because he couldn't reach his legs. Now, we have a caregiver for him.  We also have a f

## 2020-03-03 NOTE — TELEPHONE ENCOUNTER
LMTCB transfer to triage--to jose cruz Fernandez. My Chart message sent  Advised patient on Dr. Margarita Curry information and recommendations. Patient verbalized understanding.  Confirmed appt Thursday 3/5/20 at 2:30 pm.

## 2020-03-05 ENCOUNTER — OFFICE VISIT (OUTPATIENT)
Dept: INTERNAL MEDICINE CLINIC | Facility: CLINIC | Age: 70
End: 2020-03-05
Payer: MEDICARE

## 2020-03-05 VITALS
TEMPERATURE: 97 F | HEIGHT: 66 IN | BODY MASS INDEX: 34.39 KG/M2 | HEART RATE: 78 BPM | SYSTOLIC BLOOD PRESSURE: 123 MMHG | DIASTOLIC BLOOD PRESSURE: 66 MMHG | WEIGHT: 214 LBS | OXYGEN SATURATION: 98 %

## 2020-03-05 DIAGNOSIS — I10 ESSENTIAL HYPERTENSION: ICD-10-CM

## 2020-03-05 DIAGNOSIS — I87.2 CHRONIC VENOUS INSUFFICIENCY OF LOWER EXTREMITY: Primary | ICD-10-CM

## 2020-03-05 DIAGNOSIS — R05.3 CHRONIC COUGH: ICD-10-CM

## 2020-03-05 DIAGNOSIS — B35.3 TINEA PEDIS OF BOTH FEET: ICD-10-CM

## 2020-03-05 DIAGNOSIS — Z86.718 HISTORY OF DVT (DEEP VEIN THROMBOSIS): ICD-10-CM

## 2020-03-05 DIAGNOSIS — E78.00 PURE HYPERCHOLESTEROLEMIA: ICD-10-CM

## 2020-03-05 DIAGNOSIS — R73.03 PREDIABETES: ICD-10-CM

## 2020-03-05 PROCEDURE — 99214 OFFICE O/P EST MOD 30 MIN: CPT | Performed by: INTERNAL MEDICINE

## 2020-03-05 PROCEDURE — G0463 HOSPITAL OUTPT CLINIC VISIT: HCPCS | Performed by: INTERNAL MEDICINE

## 2020-03-06 NOTE — OCCUPATIONAL THERAPY NOTE
OCCUPATIONAL THERAPY EVALUATION - INPATIENT      Room Number: 153/319-M  Evaluation Date: 4/17/2019  Type of Evaluation: Initial  Presenting Problem: (s/p right closed hip reduction and nail fixation)    Physician Order: IP Consult to Occupational Therapy activities; Energy conservation/work simplification techniques;ADL training;Functional transfer training; Endurance training;Patient/Family education;Patient/Family training;Equipment eval/education       OCCUPATIONAL THERAPY MEDICAL/SOCIAL HISTORY     Probl Upper extremity ROM is within functional limits     STRENGTH ASSESSMENT  Upper extremity strength is within functional limits       ACTIVITIES OF DAILY LIVING ASSESSMENT  AM-PAC ‘6-Clicks’ Inpatient Daily Activity Short Form  How much help from another p Healthcare  #[id number] Metabolic acidosis, normal anion gap (NAG)

## 2020-03-08 RX ORDER — FINASTERIDE 5 MG/1
TABLET, FILM COATED ORAL
Qty: 30 TABLET | Refills: 0 | Status: SHIPPED | OUTPATIENT
Start: 2020-03-08 | End: 2020-03-30

## 2020-03-08 NOTE — PROGRESS NOTES
HPI:    Patient ID: Nora Guallpa is a 72year old male. Patient presents today for follow up regarding his chronc bilateral leg swelling. 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  He had been advised and treated before with comp tablet (8.6 mg total) by mouth 2 (two) times daily. 90 tablet 1   • Sennosides 15 MG Oral Tab Take 1 tablet (15 mg total) by mouth daily as needed. Take by mouth. 90 tablet 0   • ketoconazole 2 % External Cream Apply 1 Application topically daily.  60 g 0 distension. There is no tenderness. There is no rebound and no guarding. Musculoskeletal:         General: Edema present. Comments: Stasis dermatitis/hyperpigmentation of both legs   Neurological: He is alert. Skin: Rash noted.  He is not diaphoret

## 2020-03-10 ENCOUNTER — TELEPHONE (OUTPATIENT)
Dept: OTHER | Age: 70
End: 2020-03-10

## 2020-03-10 ENCOUNTER — APPOINTMENT (OUTPATIENT)
Dept: LAB | Age: 70
End: 2020-03-10
Attending: INTERNAL MEDICINE
Payer: MEDICARE

## 2020-03-10 DIAGNOSIS — Z12.5 PROSTATE CANCER SCREENING: ICD-10-CM

## 2020-03-10 DIAGNOSIS — R73.03 PREDIABETES: ICD-10-CM

## 2020-03-10 DIAGNOSIS — E78.00 PURE HYPERCHOLESTEROLEMIA: ICD-10-CM

## 2020-03-10 LAB
ALBUMIN SERPL-MCNC: 3.3 G/DL (ref 3.4–5)
ALBUMIN/GLOB SERPL: 0.7 {RATIO} (ref 1–2)
ALP LIVER SERPL-CCNC: 84 U/L (ref 45–117)
ALT SERPL-CCNC: 17 U/L (ref 16–61)
ANION GAP SERPL CALC-SCNC: 4 MMOL/L (ref 0–18)
AST SERPL-CCNC: 16 U/L (ref 15–37)
BILIRUB SERPL-MCNC: 1 MG/DL (ref 0.1–2)
BUN BLD-MCNC: 18 MG/DL (ref 7–18)
BUN/CREAT SERPL: 15.9 (ref 10–20)
CALCIUM BLD-MCNC: 8.7 MG/DL (ref 8.5–10.1)
CHLORIDE SERPL-SCNC: 106 MMOL/L (ref 98–112)
CHOLEST SMN-MCNC: 154 MG/DL (ref ?–200)
CO2 SERPL-SCNC: 28 MMOL/L (ref 21–32)
COMPLEXED PSA SERPL-MCNC: 0.3 NG/ML (ref ?–4)
CREAT BLD-MCNC: 1.13 MG/DL (ref 0.7–1.3)
EST. AVERAGE GLUCOSE BLD GHB EST-MCNC: 128 MG/DL (ref 68–126)
GLOBULIN PLAS-MCNC: 4.7 G/DL (ref 2.8–4.4)
GLUCOSE BLD-MCNC: 92 MG/DL (ref 70–99)
HBA1C MFR BLD HPLC: 6.1 % (ref ?–5.7)
HDLC SERPL-MCNC: 49 MG/DL (ref 40–59)
LDLC SERPL CALC-MCNC: 86 MG/DL (ref ?–100)
M PROTEIN MFR SERPL ELPH: 8 G/DL (ref 6.4–8.2)
NONHDLC SERPL-MCNC: 105 MG/DL (ref ?–130)
OSMOLALITY SERPL CALC.SUM OF ELEC: 288 MOSM/KG (ref 275–295)
PATIENT FASTING Y/N/NP: YES
PATIENT FASTING Y/N/NP: YES
POTASSIUM SERPL-SCNC: 4.4 MMOL/L (ref 3.5–5.1)
SODIUM SERPL-SCNC: 138 MMOL/L (ref 136–145)
TRIGL SERPL-MCNC: 95 MG/DL (ref 30–149)
VLDLC SERPL CALC-MCNC: 19 MG/DL (ref 0–30)

## 2020-03-10 PROCEDURE — 80061 LIPID PANEL: CPT

## 2020-03-10 PROCEDURE — 83036 HEMOGLOBIN GLYCOSYLATED A1C: CPT

## 2020-03-10 PROCEDURE — 36415 COLL VENOUS BLD VENIPUNCTURE: CPT

## 2020-03-10 PROCEDURE — 80053 COMPREHEN METABOLIC PANEL: CPT

## 2020-03-10 NOTE — TELEPHONE ENCOUNTER
Pt would like to know what specialist Dr. Rachel Alba would want him to see for his cough. Informed pt per provider's note pt suppose to have a PFT completed. Phone number given to schedule test. Other questions answered.  No further questions at present cameron

## 2020-03-12 ENCOUNTER — HOSPITAL ENCOUNTER (OUTPATIENT)
Dept: RESPIRATORY THERAPY | Facility: HOSPITAL | Age: 70
Discharge: HOME | End: 2020-03-12
Attending: INTERNAL MEDICINE
Payer: MEDICARE

## 2020-03-12 DIAGNOSIS — R05.3 CHRONIC COUGH: ICD-10-CM

## 2020-03-12 PROCEDURE — 94726 PLETHYSMOGRAPHY LUNG VOLUMES: CPT | Performed by: INTERNAL MEDICINE

## 2020-03-12 PROCEDURE — 94060 EVALUATION OF WHEEZING: CPT | Performed by: INTERNAL MEDICINE

## 2020-03-12 PROCEDURE — 94729 DIFFUSING CAPACITY: CPT | Performed by: INTERNAL MEDICINE

## 2020-03-17 ENCOUNTER — TELEPHONE (OUTPATIENT)
Dept: PODIATRY CLINIC | Facility: CLINIC | Age: 70
End: 2020-03-17

## 2020-03-17 NOTE — TELEPHONE ENCOUNTER
Attempted to call cell.  Unable to leave John Guardado is full    [facility]    Attempting to discuss upcoming appt w/pt

## 2020-03-17 NOTE — TELEPHONE ENCOUNTER
Attempted to call pt, no answer, mailbox full.    (attempting to s/w pt re upcoming appt and the necessity of appt re: covid-19)

## 2020-03-17 NOTE — TELEPHONE ENCOUNTER
Pt called stating pt has an appointment on 3-19-20. Unable to reach the nurse. Pt is putting eucerin on pt's feet. It had a .    Call pt to advise

## 2020-03-17 NOTE — TELEPHONE ENCOUNTER
S/w pt. It is just his usual appt to get his nails cut. No open wounds, no concerns re: feet. Pt has been putting eucerin on feet. Pt agreeable to rescheduling d/t covid 19. Pt rescheduled to 4/16/20.

## 2020-03-23 NOTE — ADDENDUM NOTE
Encounter addended by: Hema Sepulveda MD on: 3/23/2020 1:19 PM   Actions taken: Clinical Note Signed, Charge Capture section accepted

## 2020-03-23 NOTE — PROCEDURES
[facility]    Patient's Name Lauren Wilson MRN [id number]    1950 Pulmonologist Chastity Valle MD   Location [facility] PCP Stefani Hobbs MD     IMPRESSION:    The PFTs are Abnormal.    There is se

## 2020-03-24 ENCOUNTER — TELEPHONE (OUTPATIENT)
Dept: INTERNAL MEDICINE CLINIC | Facility: CLINIC | Age: 70
End: 2020-03-24

## 2020-03-24 DIAGNOSIS — R05.3 CHRONIC COUGH: Primary | ICD-10-CM

## 2020-03-24 DIAGNOSIS — R94.2 ABNORMAL PFTS (PULMONARY FUNCTION TESTS): ICD-10-CM

## 2020-03-24 RX ORDER — INHALER, ASSIST DEVICES
SPACER (EA) MISCELLANEOUS
Qty: 1 DEVICE | Refills: 0 | Status: SHIPPED | OUTPATIENT
Start: 2020-03-24

## 2020-03-24 RX ORDER — ALBUTEROL SULFATE 90 UG/1
2 AEROSOL, METERED RESPIRATORY (INHALATION) EVERY 6 HOURS PRN
Qty: 1 INHALER | Refills: 1 | Status: SHIPPED | OUTPATIENT
Start: 2020-03-24 | End: 2020-10-15 | Stop reason: ALTCHOICE

## 2020-03-30 RX ORDER — FINASTERIDE 5 MG/1
TABLET, FILM COATED ORAL
Qty: 30 TABLET | Refills: 0 | Status: SHIPPED | OUTPATIENT
Start: 2020-03-30 | End: 2020-05-08

## 2020-03-30 NOTE — TELEPHONE ENCOUNTER
Current Outpatient Medications   Medication Sig Dispense Refill   • finasteride 5 MG Oral Tab TAKE 1 TABLET(5 MG) BY MOUTH DAILY 30 tablet 0

## 2020-03-31 ENCOUNTER — TELEPHONE (OUTPATIENT)
Dept: INTERNAL MEDICINE CLINIC | Facility: CLINIC | Age: 70
End: 2020-03-31

## 2020-03-31 NOTE — TELEPHONE ENCOUNTER
Received a call from the patient who is requesting to know why he was prescribed 2 inhalers. Patient made aware that Dr. Viktor Curry prescribed the albuterol inhaler which he can use if he has wheezing or shortness of breath 2puffs q 6hr prn.  Patient inform

## 2020-04-14 ENCOUNTER — TELEPHONE (OUTPATIENT)
Dept: INTERNAL MEDICINE CLINIC | Facility: CLINIC | Age: 70
End: 2020-04-14

## 2020-04-14 DIAGNOSIS — R60.0 BILATERAL LEG EDEMA: Primary | ICD-10-CM

## 2020-04-14 RX ORDER — FUROSEMIDE 40 MG/1
40 TABLET ORAL DAILY
Qty: 90 TABLET | Refills: 0 | Status: SHIPPED | OUTPATIENT
Start: 2020-04-14 | End: 2020-08-17

## 2020-04-14 NOTE — TELEPHONE ENCOUNTER
Prior to calling patient, do you want BMP this week for baseline or wait a week or two to check levels?

## 2020-04-14 NOTE — TELEPHONE ENCOUNTER
Patient is calling regarding water pills that Dr. Teetee Graham prescribed patient. Patient wants to know if he can take water pill 7 times a week. Please advise.

## 2020-04-14 NOTE — TELEPHONE ENCOUNTER
Spoke with patient ( verified) RE: message below:    \"Dr. Perla Guaman has me on the water pills Monday, Wednesday and Friday--I thought if I took them 7 days a week like my other pills, it would help get the water out and get the swelling down.  I I have

## 2020-04-14 NOTE — TELEPHONE ENCOUNTER
Ok to keep on lasix 40mg po daily; erx sent however I do want him to get blood test BMP so we can check his potassium and his kidney function making sure he is not getting too dry and low in potassium. Order in epic and doesn't need to be fasting.

## 2020-04-15 NOTE — TELEPHONE ENCOUNTER
Dr. Andrei Araya, pt had not started taking Lasix daily, he was suggesting it and was still only taking 3 days/week until now. He will start taking daily tomorrow. I advised he can do the BMP end of next week.  Pt was agreeable and verbalized understandi

## 2020-05-04 ENCOUNTER — APPOINTMENT (OUTPATIENT)
Dept: LAB | Age: 70
End: 2020-05-04
Attending: INTERNAL MEDICINE
Payer: MEDICARE

## 2020-05-04 DIAGNOSIS — R60.0 BILATERAL LEG EDEMA: ICD-10-CM

## 2020-05-04 PROCEDURE — 36415 COLL VENOUS BLD VENIPUNCTURE: CPT

## 2020-05-04 PROCEDURE — 80048 BASIC METABOLIC PNL TOTAL CA: CPT

## 2020-05-07 ENCOUNTER — TELEPHONE (OUTPATIENT)
Dept: INTERNAL MEDICINE CLINIC | Facility: CLINIC | Age: 70
End: 2020-05-07

## 2020-05-07 DIAGNOSIS — R60.0 BILATERAL LEG EDEMA: Primary | ICD-10-CM

## 2020-05-07 NOTE — TELEPHONE ENCOUNTER
Spoke with the patient (Name and  of pt verified). All results and recommendations reviewed. Patient verbalizes understanding, denies further questions and agrees with plan of care.     Written by Pat Morelos MD on 2020  8:42 AM   HI Mr Tamela Hernandezver

## 2020-05-08 RX ORDER — FINASTERIDE 5 MG/1
TABLET, FILM COATED ORAL
Qty: 90 TABLET | Refills: 1 | Status: SHIPPED | OUTPATIENT
Start: 2020-05-08 | End: 2020-11-02

## 2020-05-11 ENCOUNTER — TELEPHONE (OUTPATIENT)
Dept: INTERNAL MEDICINE CLINIC | Facility: CLINIC | Age: 70
End: 2020-05-11

## 2020-05-12 NOTE — TELEPHONE ENCOUNTER
[facility] form completed by Dr. Jeffrey Salmeron back and confirmed sent. Original placed for scanning to  Chart.

## 2020-06-29 ENCOUNTER — TELEPHONE (OUTPATIENT)
Dept: INTERNAL MEDICINE CLINIC | Facility: CLINIC | Age: 70
End: 2020-06-29

## 2020-06-29 NOTE — TELEPHONE ENCOUNTER
Dr Kinsey Jay, please advise. OV 3/5/20. Patient said, after rehab PT, which is going ok, his right leg, sometimes his left leg, gets warm.  Also right foot swollen a little plus there is swelling right knee to right thigh, but he can bend the right knee

## 2020-07-07 ENCOUNTER — LAB ENCOUNTER (OUTPATIENT)
Dept: LAB | Age: 70
End: 2020-07-07
Attending: INTERNAL MEDICINE
Payer: MEDICARE

## 2020-07-07 ENCOUNTER — OFFICE VISIT (OUTPATIENT)
Dept: INTERNAL MEDICINE CLINIC | Facility: CLINIC | Age: 70
End: 2020-07-07
Payer: MEDICARE

## 2020-07-07 VITALS
WEIGHT: 216 LBS | HEIGHT: 66 IN | TEMPERATURE: 98 F | SYSTOLIC BLOOD PRESSURE: 130 MMHG | DIASTOLIC BLOOD PRESSURE: 74 MMHG | RESPIRATION RATE: 16 BRPM | HEART RATE: 77 BPM | BODY MASS INDEX: 34.72 KG/M2

## 2020-07-07 DIAGNOSIS — I89.0 LYMPHEDEMA OF BOTH LOWER EXTREMITIES: Primary | ICD-10-CM

## 2020-07-07 DIAGNOSIS — R53.83 OTHER FATIGUE: ICD-10-CM

## 2020-07-07 DIAGNOSIS — R60.0 BILATERAL LEG EDEMA: ICD-10-CM

## 2020-07-07 LAB
ANION GAP SERPL CALC-SCNC: 3 MMOL/L (ref 0–18)
BASOPHILS # BLD AUTO: 0.03 X10(3) UL (ref 0–0.2)
BASOPHILS NFR BLD AUTO: 0.5 %
BUN BLD-MCNC: 17 MG/DL (ref 7–18)
BUN/CREAT SERPL: 15.3 (ref 10–20)
CALCIUM BLD-MCNC: 8.1 MG/DL (ref 8.5–10.1)
CHLORIDE SERPL-SCNC: 102 MMOL/L (ref 98–112)
CO2 SERPL-SCNC: 32 MMOL/L (ref 21–32)
CREAT BLD-MCNC: 1.11 MG/DL (ref 0.7–1.3)
DEPRECATED RDW RBC AUTO: 45.8 FL (ref 35.1–46.3)
EOSINOPHIL # BLD AUTO: 0.49 X10(3) UL (ref 0–0.7)
EOSINOPHIL NFR BLD AUTO: 8.9 %
ERYTHROCYTE [DISTWIDTH] IN BLOOD BY AUTOMATED COUNT: 13.5 % (ref 11–15)
GLUCOSE BLD-MCNC: 91 MG/DL (ref 70–99)
HCT VFR BLD AUTO: 40.4 % (ref 39–53)
HGB BLD-MCNC: 13.3 G/DL (ref 13–17.5)
IMM GRANULOCYTES # BLD AUTO: 0.01 X10(3) UL (ref 0–1)
IMM GRANULOCYTES NFR BLD: 0.2 %
LYMPHOCYTES # BLD AUTO: 1.03 X10(3) UL (ref 1–4)
LYMPHOCYTES NFR BLD AUTO: 18.7 %
MCH RBC QN AUTO: 30.4 PG (ref 26–34)
MCHC RBC AUTO-ENTMCNC: 32.9 G/DL (ref 31–37)
MCV RBC AUTO: 92.2 FL (ref 80–100)
MONOCYTES # BLD AUTO: 0.51 X10(3) UL (ref 0.1–1)
MONOCYTES NFR BLD AUTO: 9.2 %
NEUTROPHILS # BLD AUTO: 3.45 X10 (3) UL (ref 1.5–7.7)
NEUTROPHILS # BLD AUTO: 3.45 X10(3) UL (ref 1.5–7.7)
NEUTROPHILS NFR BLD AUTO: 62.5 %
OSMOLALITY SERPL CALC.SUM OF ELEC: 285 MOSM/KG (ref 275–295)
PATIENT FASTING Y/N/NP: NO
PLATELET # BLD AUTO: 224 10(3)UL (ref 150–450)
POTASSIUM SERPL-SCNC: 4.6 MMOL/L (ref 3.5–5.1)
RBC # BLD AUTO: 4.38 X10(6)UL (ref 3.8–5.8)
SODIUM SERPL-SCNC: 137 MMOL/L (ref 136–145)
TSI SER-ACNC: 3.99 MIU/ML (ref 0.36–3.74)
WBC # BLD AUTO: 5.5 X10(3) UL (ref 4–11)

## 2020-07-07 PROCEDURE — 99214 OFFICE O/P EST MOD 30 MIN: CPT | Performed by: INTERNAL MEDICINE

## 2020-07-07 PROCEDURE — 84443 ASSAY THYROID STIM HORMONE: CPT

## 2020-07-07 PROCEDURE — 80048 BASIC METABOLIC PNL TOTAL CA: CPT

## 2020-07-07 PROCEDURE — 85025 COMPLETE CBC W/AUTO DIFF WBC: CPT

## 2020-07-07 PROCEDURE — G0463 HOSPITAL OUTPT CLINIC VISIT: HCPCS | Performed by: INTERNAL MEDICINE

## 2020-07-07 PROCEDURE — 36415 COLL VENOUS BLD VENIPUNCTURE: CPT

## 2020-07-09 ENCOUNTER — TELEPHONE (OUTPATIENT)
Dept: INTERNAL MEDICINE CLINIC | Facility: CLINIC | Age: 70
End: 2020-07-09

## 2020-07-09 DIAGNOSIS — R79.89 ELEVATED TSH: Primary | ICD-10-CM

## 2020-07-09 NOTE — PROGRESS NOTES
HPI:    Patient ID: Silvino Bergeron is a 72year old male. Swelling   This is a chronic (bilateral leg swelling/edema) problem. The current episode started more than 1 year ago. The problem occurs constantly. The problem has been unchanged.  Associated s DAILY 180 tablet 1   • Senna 8.6 MG Oral Tab Take 1 tablet (8.6 mg total) by mouth 2 (two) times daily. 90 tablet 1   • Sennosides 15 MG Oral Tab Take 1 tablet (15 mg total) by mouth daily as needed. Take by mouth.  90 tablet 0   • triamcinolone acetonide 0 Conjunctivae are normal. Right eye exhibits no discharge. Left eye exhibits no discharge. No scleral icterus. Neck: Normal range of motion. No thyromegaly present. Cardiovascular: Normal rate. Pulmonary/Chest: Effort normal. No respiratory distress.

## 2020-07-10 RX ORDER — SENNOSIDES 8.6 MG/1
TABLET ORAL
Qty: 90 TABLET | Refills: 1 | Status: SHIPPED | OUTPATIENT
Start: 2020-07-10 | End: 2020-12-03

## 2020-07-12 ENCOUNTER — TELEPHONE (OUTPATIENT)
Dept: INTERNAL MEDICINE CLINIC | Facility: CLINIC | Age: 70
End: 2020-07-12

## 2020-07-12 DIAGNOSIS — R53.83 FATIGUE, UNSPECIFIED TYPE: ICD-10-CM

## 2020-07-12 DIAGNOSIS — R79.89 ELEVATED TSH: Primary | ICD-10-CM

## 2020-07-16 ENCOUNTER — OFFICE VISIT (OUTPATIENT)
Dept: WOUND CARE | Facility: HOSPITAL | Age: 70
End: 2020-07-16
Attending: CLINICAL NURSE SPECIALIST
Payer: MEDICARE

## 2020-07-16 DIAGNOSIS — I89.0 LYMPHEDEMA OF BOTH LOWER EXTREMITIES: ICD-10-CM

## 2020-07-16 DIAGNOSIS — S81.802A LEG WOUND, LEFT, INITIAL ENCOUNTER: ICD-10-CM

## 2020-07-16 DIAGNOSIS — S81.801A LEG WOUND, RIGHT, INITIAL ENCOUNTER: ICD-10-CM

## 2020-07-16 DIAGNOSIS — I87.2 VENOUS INSUFFICIENCY: Primary | ICD-10-CM

## 2020-07-16 PROCEDURE — 99215 OFFICE O/P EST HI 40 MIN: CPT

## 2020-07-16 PROCEDURE — 29581 APPL MULTLAYER CMPRN SYS LEG: CPT

## 2020-07-16 NOTE — PROGRESS NOTES
Subjective    Chief Complaint  This information was obtained from the patient  The patient is new to the [address] [facility] here for an initial visit for the evaluation and management of non-healing blister on left lower leg and edema on BLE.     Allergies  ACE I Cardiovascular (Central/Peripheral): Edema (BLE)  Musculoskeletal: Assistive Devices (walker), Joint Swelling (knees), Joint Pain  Integumentary (Hair/Skin/Nails): Ulcers (LLE closed blister/RLE wound), Dryness (BLE excessive dry skin), Hemosiderin Stainin furosemide - oral 40 mg tablet once daily  Multiple Vitamin-Minerals - oral tablet once daily  hydrocodone-acetaminophen - oral 5 mg-325 mg tablet every 4-6 hours as needed  codeine-guaifenesin - oral 10 mg/5 mL-100 mg/5 mL liquid every 6 hours as needed The periwound skin exhibited: Edema, Dry/Scaly, Hemosiderosis.  The periwound skin did not exhibit: Brawny Induration, Excoriation, Induration, Callus, Crepitus, Fluctuance, Friable, Rash, Moist, Maceration, Atrophie Buffy, Cyanosis, Ecchymosis, Erythema, Temperature of Extremity: Warm  Capillary Refill: < 3 seconds  Erythema: No  Dependent Rubor: No  Hyperpigmentation: No  Lipodermatosclerosis: No  Right Extremity colors, hair growth, and conditions:  Extremity Color: Pigmented  Hair Growth on Extremity: N Biphasic bilateral pedal/posterior tibial pulses via handheld Doppler. Adequate perfusion noted. Plan:  To order  patient Velcro compression garments for long-term manage of venous insufficiency and prevention of  venous stasis ulcerations from forming Other: - keep compression wraps clean and dry    Wound #3 Left, Lateral, Anterior Lower Leg    Wound Cleansing & Dressings  Clean wound with Normal Saline or Wound Cleanser. Clean wound with soap and water.   Silver alginate - apply zinc oxide on the periw Post Procedural Pain: 0  Response to Treatment: Procedure was tolerated well  Compression Layers: Multi-Layer  Compression Product Type: Comprilan  Extremity Location: Lower Left Extremity    Electronic Signature(s)  Signed By: Date:  Sangita Nieto RN 07/16

## 2020-07-21 ENCOUNTER — OFFICE VISIT (OUTPATIENT)
Dept: WOUND CARE | Facility: HOSPITAL | Age: 70
End: 2020-07-21
Attending: CLINICAL NURSE SPECIALIST
Payer: MEDICARE

## 2020-07-21 DIAGNOSIS — S81.801A LEG WOUND, RIGHT, INITIAL ENCOUNTER: ICD-10-CM

## 2020-07-21 DIAGNOSIS — S81.802A LEG WOUND, LEFT, INITIAL ENCOUNTER: ICD-10-CM

## 2020-07-21 DIAGNOSIS — I87.2 VENOUS INSUFFICIENCY: Primary | ICD-10-CM

## 2020-07-21 DIAGNOSIS — I89.0 LYMPHEDEMA OF BOTH LOWER EXTREMITIES: ICD-10-CM

## 2020-07-21 PROCEDURE — 97161 PT EVAL LOW COMPLEX 20 MIN: CPT

## 2020-07-21 PROCEDURE — 29581 APPL MULTLAYER CMPRN SYS LEG: CPT

## 2020-07-21 NOTE — PROGRESS NOTES
Subjective    Chief Complaint  This information was obtained from the patient  7/21/2020 \"I went to get some blood work done and they are [facility] give me results soon, I have been tired all the time\".     Allergies  ACE Inhibitors    HPI  This information Wound #3 Left, Lateral, Anterior Lower Leg is an acute Full Thickness Venous Ulcer and has received a status of Not Healed.  Initial wound encounter measurements are 3.4cm length x 3.5cm width x 0.2cm depth, with an area of 11.9 sq cm and a volume of 2.38 c Right Extremity colors, hair growth, and conditions:  Extremity Color: Pigmented  Hair Growth on Extremity: No  Temperature of Extremity: Warm  Capillary Refill: < 3 seconds  Erythema: No  Dependent Rubor: No  Hyperpigmentation: No  Lipodermatosclerosis: N Pt will continue to be seen 1x/week for 8-10 weeks for selective debridement ([id number], [id number]), advanced wound dressings, compression wrapping ([id number]), and possible epifix (-[id number], [facility]-[id number]) to achieve the above mentioned goals.       Wound Orders Cleansed wound and periwound with non-cytotoxic agent. using Wound Cleanser Spray (1)  Applied topical product to doug-wound area avoiding wound base. using Vaseline (1)  Applied Primary Wound Dressing.  using Xeroform 2x2 (1)  Dressing secured with non-all Physician's certification required: Yes    Certification From:  7/21/2020  To: 10/19/2020  Natalia Pagan [id number]

## 2020-07-22 ENCOUNTER — APPOINTMENT (OUTPATIENT)
Dept: WOUND CARE | Facility: HOSPITAL | Age: 70
End: 2020-07-22
Payer: MEDICARE

## 2020-07-22 ENCOUNTER — TELEPHONE (OUTPATIENT)
Dept: INTERNAL MEDICINE CLINIC | Facility: CLINIC | Age: 70
End: 2020-07-22

## 2020-07-22 NOTE — TELEPHONE ENCOUNTER
Patient calling asking lab results and medication    Patient informed of results ( identified name and ) and recommendations, as per provider's result note. Patient voiced understanding and agrees. He does not take any vitamins .      Will have his bloo

## 2020-07-23 NOTE — TELEPHONE ENCOUNTER
pls see mychart result note for his labs done on July 7;   Stay on same dose of lasix. He needs to see wound clinic as advised by vascular surgeon before.

## 2020-07-23 NOTE — TELEPHONE ENCOUNTER
Patient notified of provider's recommendation. Patient verbalized understanding. Patient intends to have thyroid testing done.

## 2020-07-28 ENCOUNTER — APPOINTMENT (OUTPATIENT)
Dept: WOUND CARE | Facility: HOSPITAL | Age: 70
End: 2020-07-28
Payer: MEDICARE

## 2020-07-28 ENCOUNTER — OFFICE VISIT (OUTPATIENT)
Dept: WOUND CARE | Facility: HOSPITAL | Age: 70
End: 2020-07-28
Attending: CLINICAL NURSE SPECIALIST
Payer: MEDICARE

## 2020-07-28 DIAGNOSIS — I89.0 LYMPHEDEMA OF BOTH LOWER EXTREMITIES: Primary | ICD-10-CM

## 2020-07-28 PROCEDURE — 29581 APPL MULTLAYER CMPRN SYS LEG: CPT

## 2020-07-28 NOTE — PROGRESS NOTES
Subjective    Chief Complaint  This information was obtained from the patient  7/21/2020 \" I feel like the legs are healing pretty well the wound is smaller\".     Allergies  ACE Inhibitors    HPI  This information was obtained from the patient  7/16/2020: Wound #3 Left, Lateral, Anterior Lower Leg is an acute Full Thickness Venous Ulcer and has received a status of Not Healed.  Subsequent wound encounter measurements are 2.5cm length x 2cm width x 0.1cm depth, with an area of 5 sq cm and a volume of 0.5 cubi (Encounter Diagnosis) V31.066A - Unspecified open wound, left lower leg, initial encounter  (Encounter Diagnosis) I89.0 - Lymphedema, not elsewhere classified        Plan  Continue per POC as above to resolve wounds and transition to LE stockings for 20-30

## 2020-07-29 ENCOUNTER — APPOINTMENT (OUTPATIENT)
Dept: WOUND CARE | Facility: HOSPITAL | Age: 70
End: 2020-07-29
Payer: MEDICARE

## 2020-07-30 ENCOUNTER — APPOINTMENT (OUTPATIENT)
Dept: WOUND CARE | Facility: HOSPITAL | Age: 70
End: 2020-07-30
Payer: MEDICARE

## 2020-07-30 ENCOUNTER — APPOINTMENT (OUTPATIENT)
Dept: LAB | Age: 70
End: 2020-07-30
Attending: INTERNAL MEDICINE
Payer: MEDICARE

## 2020-07-30 DIAGNOSIS — R53.83 FATIGUE, UNSPECIFIED TYPE: ICD-10-CM

## 2020-07-30 DIAGNOSIS — R79.89 ELEVATED TSH: ICD-10-CM

## 2020-07-30 LAB
T3FREE SERPL-MCNC: 2.4 PG/ML (ref 2.4–4.2)
T4 FREE SERPL-MCNC: 1.2 NG/DL (ref 0.8–1.7)
TSI SER-ACNC: 3.32 MIU/ML (ref 0.36–3.74)

## 2020-07-30 PROCEDURE — 84439 ASSAY OF FREE THYROXINE: CPT

## 2020-07-30 PROCEDURE — 36415 COLL VENOUS BLD VENIPUNCTURE: CPT

## 2020-07-30 PROCEDURE — 84443 ASSAY THYROID STIM HORMONE: CPT

## 2020-07-30 PROCEDURE — 84481 FREE ASSAY (FT-3): CPT

## 2020-07-31 ENCOUNTER — TELEPHONE (OUTPATIENT)
Dept: INTERNAL MEDICINE CLINIC | Facility: CLINIC | Age: 70
End: 2020-07-31

## 2020-08-03 ENCOUNTER — TELEPHONE (OUTPATIENT)
Dept: INTERNAL MEDICINE CLINIC | Facility: CLINIC | Age: 70
End: 2020-08-03

## 2020-08-03 DIAGNOSIS — R33.9 URINARY RETENTION: ICD-10-CM

## 2020-08-03 RX ORDER — AMLODIPINE BESYLATE 5 MG/1
5 TABLET ORAL DAILY
Qty: 90 TABLET | Refills: 1 | Status: SHIPPED | OUTPATIENT
Start: 2020-08-03 | End: 2020-12-30

## 2020-08-03 RX ORDER — ATORVASTATIN CALCIUM 20 MG/1
20 TABLET, FILM COATED ORAL NIGHTLY
Qty: 90 TABLET | Refills: 1 | Status: SHIPPED | OUTPATIENT
Start: 2020-08-03 | End: 2021-01-29

## 2020-08-03 RX ORDER — METOPROLOL TARTRATE 50 MG/1
50 TABLET, FILM COATED ORAL 2 TIMES DAILY
Qty: 180 TABLET | Refills: 1 | Status: SHIPPED | OUTPATIENT
Start: 2020-08-03 | End: 2021-01-29

## 2020-08-03 NOTE — TELEPHONE ENCOUNTER
ATORVASTATIN 20 MG Oral Tab, TAKE 1 TABLET(20 MG) BY MOUTH EVERY NIGHT, Disp: 90 tablet, Rfl: 1  AMLODIPINE BESYLATE 5 MG Oral Tab, TAKE 1 TABLET(5 MG) BY MOUTH DAILY, Disp: 90 tablet, Rfl: 1  METOPROLOL TARTRATE 50 MG Oral Tab, TAKE 1 TABLET(50 MG) BY MINE

## 2020-08-04 ENCOUNTER — OFFICE VISIT (OUTPATIENT)
Dept: WOUND CARE | Facility: HOSPITAL | Age: 70
End: 2020-08-04
Attending: CLINICAL NURSE SPECIALIST
Payer: MEDICARE

## 2020-08-04 DIAGNOSIS — I87.2 VENOUS INSUFFICIENCY: Primary | ICD-10-CM

## 2020-08-04 DIAGNOSIS — I89.0 LYMPHEDEMA OF BOTH LOWER EXTREMITIES: ICD-10-CM

## 2020-08-04 PROCEDURE — 99213 OFFICE O/P EST LOW 20 MIN: CPT

## 2020-08-04 NOTE — PROGRESS NOTES
Subjective    Chief Complaint  This information was obtained from the patient  8/4/2020 \"I feel like my legs look good.  I will try to use the new compression system like we talked about but because its two steps liners and Velcro it may be harder for me\" The periwound skin exhibited: Edema, Dry/Scaly, Hemosiderosis.  The periwound skin did not exhibit: Brawny Induration, Excoriation, Induration, Callus, Crepitus, Fluctuance, Friable, Rash, Moist, Maceration, Atrophie Buffy, Cyanosis, Ecchymosis, Erythema, Plan  LE compression applied Velcro compression issued with 2 Velcro systems and 4 liners. Self care instruction provided. Follow up to access success at home.      Wound Orders:  Wound #1 Right Lower Leg    Wound Cleansing & Dressings  Clean wound with Nor Applied topical product to doug-wound area avoiding wound base. using Vaseline (1)  Compression wrapping  Compression applied to: using Toe bases to tibial tubercle (1)  Compression used: using Juxta Lite (1)  Written PT Goals - . Short Term (4-6 weeks)  Re Pt received his B LE Velcro compression system with liners. Pt and family education was provided for application and care of Velcro compression system.  Pt advised to remove daily apply moisturizer daily, sleep in his normal bed, shower and reapply LE compr

## 2020-08-05 RX ORDER — TAMSULOSIN HYDROCHLORIDE 0.4 MG/1
CAPSULE ORAL
Qty: 180 CAPSULE | Refills: 3 | OUTPATIENT
Start: 2020-08-05

## 2020-08-06 ENCOUNTER — APPOINTMENT (OUTPATIENT)
Dept: WOUND CARE | Facility: HOSPITAL | Age: 70
End: 2020-08-06
Payer: MEDICARE

## 2020-08-10 ENCOUNTER — TELEPHONE (OUTPATIENT)
Dept: SURGERY | Facility: CLINIC | Age: 70
End: 2020-08-10

## 2020-08-11 ENCOUNTER — APPOINTMENT (OUTPATIENT)
Dept: WOUND CARE | Facility: HOSPITAL | Age: 70
End: 2020-08-11
Payer: MEDICARE

## 2020-08-11 ENCOUNTER — OFFICE VISIT (OUTPATIENT)
Dept: WOUND CARE | Facility: HOSPITAL | Age: 70
End: 2020-08-11
Attending: NURSE PRACTITIONER
Payer: MEDICARE

## 2020-08-11 DIAGNOSIS — S81.801A LEG WOUND, RIGHT, INITIAL ENCOUNTER: ICD-10-CM

## 2020-08-11 DIAGNOSIS — I87.2 VENOUS INSUFFICIENCY: ICD-10-CM

## 2020-08-11 DIAGNOSIS — I89.0 LYMPHEDEMA OF BOTH LOWER EXTREMITIES: Primary | ICD-10-CM

## 2020-08-11 DIAGNOSIS — S81.802A LEG WOUND, LEFT, INITIAL ENCOUNTER: ICD-10-CM

## 2020-08-11 PROCEDURE — 99213 OFFICE O/P EST LOW 20 MIN: CPT

## 2020-08-12 NOTE — PROGRESS NOTES
Subjective    Chief Complaint  This information was obtained from the patient  8/11/2020 \"I will go to [facility]ath[facility]ie [facility]hen at [address] to get stockings because I do not like the Velcro compression system it's too many pieces and too complicated for me, I just wa The periwound skin exhibited: Edema, Dry/Scaly, Hemosiderosis.  The periwound skin did not exhibit: Brawny Induration, Excoriation, Induration, Callus, Crepitus, Fluctuance, Friable, Rash, Moist, Maceration, Atrophie Buffy, Cyanosis, Ecchymosis, Erythema, Follow-Up Appointments  Return Appointment in 1 week.  - 60 minute visit x 2 B (transition to self care with 20-30 mmHg stockings from [facility])          Electronic Signature(s)  Signed By: Date:  Ramila Boston 46/85/8795 8:45:30 AM       Entered B

## 2020-08-13 ENCOUNTER — APPOINTMENT (OUTPATIENT)
Dept: WOUND CARE | Facility: HOSPITAL | Age: 70
End: 2020-08-13
Payer: MEDICARE

## 2020-08-13 ENCOUNTER — OFFICE VISIT (OUTPATIENT)
Dept: PODIATRY CLINIC | Facility: CLINIC | Age: 70
End: 2020-08-13
Payer: MEDICARE

## 2020-08-13 DIAGNOSIS — L60.2 ONYCHOGRYPHOSIS: ICD-10-CM

## 2020-08-13 DIAGNOSIS — B35.1 ONYCHOMYCOSIS: Primary | ICD-10-CM

## 2020-08-13 DIAGNOSIS — R73.03 PREDIABETES: ICD-10-CM

## 2020-08-13 DIAGNOSIS — I87.2 EDEMA OF LOWER EXTREMITY DUE TO PERIPHERAL VENOUS INSUFFICIENCY: ICD-10-CM

## 2020-08-13 PROCEDURE — 11721 DEBRIDE NAIL 6 OR MORE: CPT | Performed by: PODIATRIST

## 2020-08-17 ENCOUNTER — TELEPHONE (OUTPATIENT)
Dept: INTERNAL MEDICINE CLINIC | Facility: CLINIC | Age: 70
End: 2020-08-17

## 2020-08-17 RX ORDER — FAMOTIDINE 20 MG/1
20 TABLET ORAL 2 TIMES DAILY
Qty: 180 TABLET | Refills: 1 | Status: SHIPPED | OUTPATIENT
Start: 2020-08-17 | End: 2021-01-12

## 2020-08-17 RX ORDER — FUROSEMIDE 40 MG/1
40 TABLET ORAL DAILY
Qty: 90 TABLET | Refills: 1 | Status: SHIPPED | OUTPATIENT
Start: 2020-08-17 | End: 2021-01-13

## 2020-08-17 NOTE — PROGRESS NOTES
Chirag Lomax is a 72year old male.  Patient presents with:  Toenail Fungus  Toenail Care: Needs nails trimmed        HPI:   Patient returns to the clinic for evaluation of his swelling he has something on his medial right ankle and also to get his nails taking: Reported on 7/7/2020 ) 1 Inhaler 1   • Acetaminophen (TYLENOL ARTHRITIS PAIN OR) Take 1 tablet by mouth every 4 (four) hours as needed. • triamcinolone acetonide 0.1 % External Cream Apply 1 Application topically 2 (two) times daily.  (Patient n History Narrative      The patient does not use an assistive device. .        The patient does not live in a home with stairs.                                                      REVIEW OF SYSTEMS:   Review of Systems  Today reviewed systens as documented b compressive stockings. The patient indicates understanding of these issues and agrees to the plan.       Stalin Manzo DPM

## 2020-08-17 NOTE — TELEPHONE ENCOUNTER
furosemide 40 MG Oral Tab, Take 1 tablet (40 mg total) by mouth daily. , Disp: 90 tablet, Rfl: 0      FAMOTIDINE 20 MG Oral Tab, TAKE 1 TABLET BY MOUTH TWICE DAILY FOR GERD, Disp: 180 tablet, Rfl: 1

## 2020-08-18 ENCOUNTER — OFFICE VISIT (OUTPATIENT)
Dept: WOUND CARE | Facility: HOSPITAL | Age: 70
End: 2020-08-18
Attending: CLINICAL NURSE SPECIALIST
Payer: MEDICARE

## 2020-08-18 PROCEDURE — 99212 OFFICE O/P EST SF 10 MIN: CPT

## 2020-08-18 NOTE — PROGRESS NOTES
Subjective    Chief Complaint  This information was obtained from the patient  8/18/2020 \"I went to the Podiatrist and got my toe nails clipped and than I saw Sari Caballero at [facility] for my new stockings and I am able to put them on and off myself\". Height/Length: 62 in (157.48 cm), Weight: 210 lbs (95.45 kgs), BMI: 38.4, (36.83 °C). Assessment  Pt presents with resolved wounds and he has been successfully transitioned to self care with use of BLE compression stockings.  Pt and son education pro Written PT Goals - . Short Term (4-6 weeks)  Reduce wound area by 75%  Reduce necrosis by 100%  Written PT Goals - Long Term (8-12 weeks)  Reduce wound area by 100%  Wound closure  Patient and/or family to be independent with use of compression garments.   Jordan Hobbs

## 2020-09-05 ENCOUNTER — TELEPHONE (OUTPATIENT)
Dept: INTERNAL MEDICINE CLINIC | Facility: CLINIC | Age: 70
End: 2020-09-05

## 2020-09-05 NOTE — TELEPHONE ENCOUNTER
[facility] Medicare Discharge form signed by Dr. Porsche Garcia, faxed back and confirmed sent. Original placed for scanning to chart.

## 2020-09-10 ENCOUNTER — OFFICE VISIT (OUTPATIENT)
Dept: PODIATRY CLINIC | Facility: CLINIC | Age: 70
End: 2020-09-10
Payer: MEDICARE

## 2020-09-10 DIAGNOSIS — B35.1 ONYCHOMYCOSIS: Primary | ICD-10-CM

## 2020-09-10 NOTE — PROGRESS NOTES
Natalia Israel is a 72year old male. Patient presents with: Follow - Up: F/u on fungus on the toenails. HPI:   Patient came back today I just saw him recently.   At today's visit reviewed nurse's history as taken above, allergies medications and m nails 100 mL 5   • Albuterol Sulfate HFA (PROAIR HFA) 108 (90 Base) MCG/ACT Inhalation Aero Soln Inhale 2 puffs into the lungs every 6 (six) hours as needed for Wheezing or Shortness of Breath.  (Patient not taking: Reported on 9/10/2020 ) 1 Inhaler 1 OF SYSTEMS:   Today reviewed systens as documented below  GENERAL HEALTH: feels well otherwise  SKIN: Refer to exam below  RESPIRATORY: denies shortness of breath with exertion  CARDIOVASCULAR: denies chest pain on exertion  GI: denies abdominal pain and d

## 2020-09-23 ENCOUNTER — NURSE TRIAGE (OUTPATIENT)
Dept: INTERNAL MEDICINE CLINIC | Facility: CLINIC | Age: 70
End: 2020-09-23

## 2020-09-23 NOTE — TELEPHONE ENCOUNTER
Pt returned call, relayed message below, pt stts he wears the compression stocking daily but only elevate at night. Pt agrees to elevate the legs more, if no improvement, agrees to call Dr.Najjar for an appt, phone number given.   Advised if legs worsen whi

## 2020-09-23 NOTE — TELEPHONE ENCOUNTER
Action Requested: Summary for Provider     []  Critical Lab, Recommendations Needed  [x] Need Additional Advice  []   FYI    []   Need Orders  [] Need Medications Sent to Pharmacy  []  Other     SUMMARY: Patient calling with complaint  of bilateral leg swe

## 2020-09-23 NOTE — TELEPHONE ENCOUNTER
pls check if he is elevating his legs and if using his compression stocking every day. He has lymphedema and already seen vascular surgeon DR Abhilash Alfonso before, may need to ffup with him if getting worse.  See consult note on Oct 2019

## 2020-10-09 ENCOUNTER — NURSE TRIAGE (OUTPATIENT)
Dept: INTERNAL MEDICINE CLINIC | Facility: CLINIC | Age: 70
End: 2020-10-09

## 2020-10-09 NOTE — TELEPHONE ENCOUNTER
Spoke with pt,  verified  Pt informed of  MD recommendation, pt stated understanding. Pt stated he has no pain now. Dr Callie Cabrera provided to pt.

## 2020-10-09 NOTE — TELEPHONE ENCOUNTER
Action Requested: Summary for Provider     []  Critical Lab, Recommendations Needed  [x] Need Additional Advice  []   FYI    []   Need Orders  [] Need Medications Sent to Pharmacy  []  Other     SUMMARY: Patient calling with complaint of bilateral leg pain

## 2020-10-15 ENCOUNTER — OFFICE VISIT (OUTPATIENT)
Dept: PODIATRY CLINIC | Facility: CLINIC | Age: 70
End: 2020-10-15
Payer: MEDICARE

## 2020-10-15 DIAGNOSIS — R73.03 PREDIABETES: ICD-10-CM

## 2020-10-15 DIAGNOSIS — B35.1 ONYCHOMYCOSIS: Primary | ICD-10-CM

## 2020-10-15 DIAGNOSIS — I87.2 EDEMA OF LOWER EXTREMITY DUE TO PERIPHERAL VENOUS INSUFFICIENCY: ICD-10-CM

## 2020-10-15 DIAGNOSIS — L60.2 ONYCHOGRYPHOSIS: ICD-10-CM

## 2020-10-15 PROCEDURE — 11721 DEBRIDE NAIL 6 OR MORE: CPT | Performed by: PODIATRIST

## 2020-10-15 NOTE — PROGRESS NOTES
Sherrie Cummings is a 72year old male. Patient presents with:  Toenail Care        HPI:   Patient returns to the clinic for routine foot care and nail trimming.   At today's visit reviewed nurse's history as taken above, allergies medications and medical hi prostatic hyperplasia)    • Encounter for follow-up of acute deep vein thrombosis (DVT) of right lower extremity    • Hip fracture, right ([facility] [address]     2019   • Hyperlipidemia    • Osteoarthritis    • Scoliosis    • Unspecified essential hypertension       Pas vitals taken for this visit. Physical Exam  GENERAL: well developed, well nourished, in no apparent distress  EXTREMITIES:   1. Integument: The skin on both feet is warm and dry.   His nails 1-5 both feet are thickened yellowed and dystrophic with subungua

## 2020-10-30 NOTE — TELEPHONE ENCOUNTER
Current Outpatient Medications:   •  FINASTERIDE 5 MG Oral Tab, TAKE 1 TABLET(5 MG) BY MOUTH DAILY, Disp: 90 tablet, Rfl: 1

## 2020-11-02 RX ORDER — FINASTERIDE 5 MG/1
5 TABLET, FILM COATED ORAL DAILY
Qty: 90 TABLET | Refills: 1 | Status: SHIPPED | OUTPATIENT
Start: 2020-11-02 | End: 2021-04-12

## 2020-11-10 ENCOUNTER — TELEPHONE (OUTPATIENT)
Dept: INTERNAL MEDICINE CLINIC | Facility: CLINIC | Age: 70
End: 2020-11-10

## 2020-11-10 NOTE — TELEPHONE ENCOUNTER
Patient  calling stating he seen Dr. Sophie Roach for his lymphedema. Per patient, compression stockings 20-30 was recommended. Patient states he currently uses 15-20 and they are too tight.    Patient wants  to know if he can reduce compression stockings to

## 2020-11-11 ENCOUNTER — TELEPHONE (OUTPATIENT)
Dept: INTERNAL MEDICINE CLINIC | Facility: CLINIC | Age: 70
End: 2020-11-11

## 2020-11-11 ENCOUNTER — TELEPHONE (OUTPATIENT)
Dept: HEMATOLOGY/ONCOLOGY | Facility: HOSPITAL | Age: 70
End: 2020-11-11

## 2020-11-11 NOTE — TELEPHONE ENCOUNTER
He should call Dr Senait Loera for his queestion about his compression stockings.    As to his  Apixaban, Dr Seema Russo hematologist saw him before and told pt  Needs to be on apixiban 2,5mg twice a day indefiinitely

## 2020-11-11 NOTE — TELEPHONE ENCOUNTER
Patient reports he is on many medications and would like to discuss with Dr Lety Meadows the possibility of discontinuing some of his medications. Advised to schedule follow up appt to discuss. Patient agreed, reports he will call back tomorrow for appt.

## 2020-11-12 ENCOUNTER — TELEPHONE (OUTPATIENT)
Dept: HEMATOLOGY/ONCOLOGY | Facility: HOSPITAL | Age: 70
End: 2020-11-12

## 2020-11-12 NOTE — TELEPHONE ENCOUNTER
Has some questions about medication. Wants to know what Maricarmen Noe is- he has a friend who takes it and can walk wonderfully now- is this something he can do. .. is it an injection, a pill, an IV, please advise him, he is wanting to walk better. Also has compression stoclings he wears- he was fitted and given 15-20 is there a way he can go looser- maybe a 10-12. ...  Please call him at [phone]

## 2020-12-02 NOTE — TELEPHONE ENCOUNTER
Current Outpatient Medications:   •  GOODSENSE SENNA LAXATIVE 8.6 MG Oral Tab, TAKE 1 TABLET BY MOUTH TWICE DAILY, Disp: 90 tablet, Rfl: 1  •

## 2020-12-03 RX ORDER — SENNA PLUS 8.6 MG/1
1 TABLET ORAL 2 TIMES DAILY
Qty: 180 TABLET | Refills: 0 | Status: SHIPPED | OUTPATIENT
Start: 2020-12-03

## 2020-12-29 ENCOUNTER — TELEPHONE (OUTPATIENT)
Dept: INTERNAL MEDICINE CLINIC | Facility: CLINIC | Age: 70
End: 2020-12-29

## 2020-12-29 NOTE — TELEPHONE ENCOUNTER
Pt requesting refill for the following medication. •  amLODIPine Besylate 5 MG Oral Tab, Take 1 tablet (5 mg total) by mouth daily. , Disp: 90 tablet, Rfl: 1

## 2020-12-30 RX ORDER — AMLODIPINE BESYLATE 5 MG/1
5 TABLET ORAL DAILY
Qty: 90 TABLET | Refills: 1 | Status: SHIPPED | OUTPATIENT
Start: 2020-12-30 | End: 2021-06-30

## 2021-01-12 RX ORDER — FAMOTIDINE 20 MG/1
TABLET ORAL
Qty: 180 TABLET | Refills: 1 | Status: SHIPPED | OUTPATIENT
Start: 2021-01-12 | End: 2021-06-30

## 2021-01-13 RX ORDER — FUROSEMIDE 40 MG/1
TABLET ORAL
Qty: 90 TABLET | Refills: 1 | Status: SHIPPED | OUTPATIENT
Start: 2021-01-13 | End: 2021-05-30

## 2021-01-15 ENCOUNTER — TELEPHONE (OUTPATIENT)
Dept: INTERNAL MEDICINE CLINIC | Facility: CLINIC | Age: 71
End: 2021-01-15

## 2021-01-15 NOTE — TELEPHONE ENCOUNTER
Patient called wanting his most recent hemoglobin result. States his friend was recently admitted with a very low hemoglobin value. He was also advised of normal hemoglobin range. He is requesting medication to increase hemoglobin.  Advised he is in normal

## 2021-01-15 NOTE — TELEPHONE ENCOUNTER
Per patient he would like to know about his Hemoglobin and that's the only thing he is curious about. Patient states that he is comparing it to his friend.

## 2021-01-28 NOTE — TELEPHONE ENCOUNTER
Per pharmacy pt is requesting refill for the following medications. •  Metoprolol Tartrate 50 MG Oral Tab, Take 1 tablet (50 mg total) by mouth 2 (two) times daily. , Disp: 180 tablet, Rfl: 1    •  atorvastatin 20 MG Oral Tab, Take 1 tablet (20 mg tota

## 2021-01-29 ENCOUNTER — TELEPHONE (OUTPATIENT)
Dept: HEMATOLOGY/ONCOLOGY | Facility: HOSPITAL | Age: 71
End: 2021-01-29

## 2021-01-29 RX ORDER — METOPROLOL TARTRATE 50 MG/1
50 TABLET, FILM COATED ORAL 2 TIMES DAILY
Qty: 180 TABLET | Refills: 1 | Status: SHIPPED | OUTPATIENT
Start: 2021-01-29 | End: 2021-06-30

## 2021-01-29 RX ORDER — ATORVASTATIN CALCIUM 20 MG/1
20 TABLET, FILM COATED ORAL NIGHTLY
Qty: 90 TABLET | Refills: 1 | Status: SHIPPED | OUTPATIENT
Start: 2021-01-29 | End: 2021-06-30

## 2021-01-29 NOTE — TELEPHONE ENCOUNTER
LVMTCB----- Message from Joseline Sparks RN sent at 1/28/2021  4:21 PM CST -----  Regarding: Scheluling request  Patient a no show for Dr Seema Russo today.   Dr Seema Russo will send in one refill on med (we got a pharmacy request) but he needs to be seen prior to any

## 2021-02-01 ENCOUNTER — TELEPHONE (OUTPATIENT)
Dept: HEMATOLOGY/ONCOLOGY | Facility: HOSPITAL | Age: 71
End: 2021-02-01

## 2021-02-01 NOTE — TELEPHONE ENCOUNTER
Liang Cordon, he wanted to know if he still had to take Eliquis, scheduled him to see MD next week to discuss.

## 2021-02-01 NOTE — TELEPHONE ENCOUNTER
Jorge A Half patient is very confused and don't know who Dr. Enrique Guevara is and he stated that Dr. Daryle Draft regulates his Eliquis and he would like a call back from the RN. He would not schedule an appointment.

## 2021-02-02 ENCOUNTER — TELEPHONE (OUTPATIENT)
Dept: INTERNAL MEDICINE CLINIC | Facility: CLINIC | Age: 71
End: 2021-02-02

## 2021-02-02 DIAGNOSIS — H54.7 VISION PROBLEM: Primary | ICD-10-CM

## 2021-02-02 NOTE — TELEPHONE ENCOUNTER
Patient is requesting a referral for an ophthalmologist located at the [facility]. Patient does not have specific name for an ophthalmologist he has to see.

## 2021-02-03 ENCOUNTER — TELEPHONE (OUTPATIENT)
Dept: INTERNAL MEDICINE CLINIC | Facility: CLINIC | Age: 71
End: 2021-02-03

## 2021-02-03 NOTE — TELEPHONE ENCOUNTER
I dont know why he is seeing ophtha but we can refer him to DR Tamar Powell who is in our building 3rd floor. Thanks.

## 2021-02-03 NOTE — TELEPHONE ENCOUNTER
Dr. Ted Griffin, patient is requesting a referral to Ophthalmology at the [address] [place] [address]. Referral has been pended, please advise.

## 2021-02-04 NOTE — TELEPHONE ENCOUNTER
Dr Vipul Butts, please advise. See pended referral, add diagnosis. Advised patient on Dr. Jennifer Sumner recommendation. Patient verbalized understanding. He works part-time.

## 2021-02-04 NOTE — TELEPHONE ENCOUNTER
Spoke with patient and relayed Dr. Keena Diaz message and scheduling # below--patient verbalizes understanding and agreement. No further questions/concerns at this time.         ________________________________________________________________  Referral In

## 2021-02-04 NOTE — TELEPHONE ENCOUNTER
Spoke with patient RE: message below--patient asking, \"How do I lose weight? \"    Please advise if patient should see nutritionist or Dr. Reny Hart?

## 2021-03-05 DIAGNOSIS — Z23 NEED FOR VACCINATION: ICD-10-CM

## 2021-03-18 ENCOUNTER — TELEPHONE (OUTPATIENT)
Dept: INTERNAL MEDICINE CLINIC | Facility: CLINIC | Age: 71
End: 2021-03-18

## 2021-03-18 NOTE — TELEPHONE ENCOUNTER
Spoke to Jameel, informed of no orders in the system. Last office visit with Dr. Viktor Curry was July of 2020.

## 2021-03-18 NOTE — TELEPHONE ENCOUNTER
Alexandria Olmos from [facility] requesting scriptl for physical therapy. States patient has appointment with them on Tuesday. Did not know what patient is getting physical therapy on.       Fax [phone]

## 2021-03-19 ENCOUNTER — TELEPHONE (OUTPATIENT)
Dept: INTERNAL MEDICINE CLINIC | Facility: CLINIC | Age: 71
End: 2021-03-19

## 2021-03-19 NOTE — TELEPHONE ENCOUNTER
Patient calling in to ask which is better for his circulation, sleeping on a recliner or a bed? Advised a bed because he moves in a bed and on a recliner he would be in one position for too long. Then he asked if he can cut back on his medications.  After r

## 2021-03-22 ENCOUNTER — TELEPHONE (OUTPATIENT)
Dept: INTERNAL MEDICINE CLINIC | Facility: CLINIC | Age: 71
End: 2021-03-22

## 2021-03-22 NOTE — TELEPHONE ENCOUNTER
Patient is requesting a call from Dr. Benjy Parra nurse. Patient has a \"technical question about a bed\". Declined to provide additional information.

## 2021-03-23 ENCOUNTER — TELEPHONE (OUTPATIENT)
Dept: INTERNAL MEDICINE CLINIC | Facility: CLINIC | Age: 71
End: 2021-03-23

## 2021-03-23 DIAGNOSIS — Z12.5 PROSTATE CANCER SCREENING: ICD-10-CM

## 2021-03-23 DIAGNOSIS — R73.03 PREDIABETES: Primary | ICD-10-CM

## 2021-03-23 DIAGNOSIS — E78.00 PURE HYPERCHOLESTEROLEMIA: ICD-10-CM

## 2021-03-23 DIAGNOSIS — I10 ESSENTIAL HYPERTENSION: ICD-10-CM

## 2021-03-23 NOTE — TELEPHONE ENCOUNTER
Dr. Marycarmen Bolivar, patient is schedule for a Physical on 04/02/2021. Patient is requesting blood test order for appointment. Please advise.

## 2021-03-23 NOTE — TELEPHONE ENCOUNTER
Patient still waiting for phone call, did not want to provide additional information until he speaks with nurse. Please advise.

## 2021-03-24 NOTE — TELEPHONE ENCOUNTER
Spoke to patient, informed of orders. Patient was transferred to [facility] due to complaining of leg swelling.

## 2021-03-24 NOTE — TELEPHONE ENCOUNTER
Spoke with patient--reports leg swelling has improved, \"because I have been laying in the bed for a week an a half. I don't always wear my compression stockings, because my legs are good sometimes. \"    Relayed to patient he needs to wear compression stoc

## 2021-03-24 NOTE — TELEPHONE ENCOUNTER
Will need to be seen in office before we can give order since not seen for 6mos  He already has apptment with me in 2 weeks so he can keep that apptment.

## 2021-04-02 ENCOUNTER — OFFICE VISIT (OUTPATIENT)
Dept: INTERNAL MEDICINE CLINIC | Facility: CLINIC | Age: 71
End: 2021-04-02
Payer: MEDICARE

## 2021-04-02 ENCOUNTER — LAB ENCOUNTER (OUTPATIENT)
Dept: LAB | Age: 71
End: 2021-04-02
Attending: INTERNAL MEDICINE
Payer: MEDICARE

## 2021-04-02 VITALS
HEART RATE: 71 BPM | BODY MASS INDEX: 33.32 KG/M2 | SYSTOLIC BLOOD PRESSURE: 130 MMHG | WEIGHT: 200 LBS | DIASTOLIC BLOOD PRESSURE: 82 MMHG | RESPIRATION RATE: 12 BRPM | TEMPERATURE: 97 F | HEIGHT: 65 IN

## 2021-04-02 DIAGNOSIS — E78.00 PURE HYPERCHOLESTEROLEMIA: ICD-10-CM

## 2021-04-02 DIAGNOSIS — I10 ESSENTIAL HYPERTENSION: ICD-10-CM

## 2021-04-02 DIAGNOSIS — R73.03 PREDIABETES: ICD-10-CM

## 2021-04-02 DIAGNOSIS — Z86.73 HISTORY OF CVA (CEREBROVASCULAR ACCIDENT): ICD-10-CM

## 2021-04-02 DIAGNOSIS — N40.0 BENIGN PROSTATIC HYPERPLASIA WITHOUT LOWER URINARY TRACT SYMPTOMS: ICD-10-CM

## 2021-04-02 DIAGNOSIS — I89.0 LYMPHEDEMA OF BOTH LOWER EXTREMITIES: ICD-10-CM

## 2021-04-02 DIAGNOSIS — Z91.81 RISK FOR FALLS: ICD-10-CM

## 2021-04-02 DIAGNOSIS — B35.1 ONYCHOMYCOSIS: ICD-10-CM

## 2021-04-02 DIAGNOSIS — Z12.5 PROSTATE CANCER SCREENING: ICD-10-CM

## 2021-04-02 DIAGNOSIS — Z86.718 HISTORY OF DVT (DEEP VEIN THROMBOSIS): ICD-10-CM

## 2021-04-02 DIAGNOSIS — E11.9 CONTROLLED TYPE 2 DIABETES MELLITUS WITHOUT COMPLICATION, WITHOUT LONG-TERM CURRENT USE OF INSULIN (HCC): ICD-10-CM

## 2021-04-02 DIAGNOSIS — Z00.00 MEDICARE ANNUAL WELLNESS VISIT, SUBSEQUENT: Primary | ICD-10-CM

## 2021-04-02 DIAGNOSIS — R53.81 PHYSICAL DECONDITIONING: ICD-10-CM

## 2021-04-02 PROCEDURE — 83036 HEMOGLOBIN GLYCOSYLATED A1C: CPT

## 2021-04-02 PROCEDURE — 85025 COMPLETE CBC W/AUTO DIFF WBC: CPT

## 2021-04-02 PROCEDURE — 80053 COMPREHEN METABOLIC PANEL: CPT

## 2021-04-02 PROCEDURE — 80061 LIPID PANEL: CPT

## 2021-04-02 PROCEDURE — 36415 COLL VENOUS BLD VENIPUNCTURE: CPT

## 2021-04-02 PROCEDURE — G0439 PPPS, SUBSEQ VISIT: HCPCS | Performed by: INTERNAL MEDICINE

## 2021-04-02 NOTE — PROGRESS NOTES
HPI:   Chhaya Alfaro is a 80year old male who presents for a Medicare Subsequent Annual Wellness visit (Pt already had Initial Annual Wellness).       His last annual assessment has been over 1 year: Annual Physical Never done         Fall/Risk Assessmen Medicare annual wellness visit, subsequent     Essential hypertension     History of CVA (cerebrovascular accident)     Onychomycosis     History of DVT (deep vein thrombosis)     Physical deconditioning     Controlled type 2 diabetes mellitus without comp CHAMBER) Does not apply Device, Use with inhalers  tamsulosin HCl 0.4 MG Oral Cap, Take 2 capsules (0.8 mg total) by mouth daily.        MEDICAL INFORMATION:   He  has a past medical history of Acute, but ill-defined, cerebrovascular disease, BPH (benign pr (90.7 kg).     Medicare Hearing Assessment  (Required for AWV/SWV)    Hearing Screening    Screening Method: Whisper Test  Whisper Test Result: Pass                Visual Acuity                           General Appearance:  Alert, cooperative, no distress, had also declined doing any colon screening.    (R53.81) Physical deconditioning  Plan: PHYSICAL THERAPY - INTERNAL        Complains of generalized weakness however he has been very inactive just sitting all day long and then lying most of the time.   He I work as he did in the past.    (Z02.338) History of DVT (deep vein thrombosis)  Plan: Patient ready been seen by our hematologist and had been continued on the low-dose anticoagulation on a chronic basis.    (N40.0) Benign prostatic hyperplasia without low Electrocardiogram date04/13/2019    Colorectal Cancer Screening      Colonoscopy Screen every 10 years Colonoscopy Never done Update Health Maintenance if applicable    Flex Sigmoidoscopy Screen every 10 years No results found for this or any previous visi Creatinine  Annually Creatinine (mg/dL)   Date Value   04/02/2021 1.16    No flowsheet data found. Drug Serum Conc  Annually No results found for: DIGOXIN, DIG, VALP No flowsheet data found.        Diabetes      HgbA1C  Annually HgbA1C (%)   Date Value
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
## 2021-04-04 PROBLEM — Z86.718 HISTORY OF DVT (DEEP VEIN THROMBOSIS): Status: ACTIVE | Noted: 2019-10-06

## 2021-04-04 PROBLEM — S09.90XA INJURY OF HEAD, INITIAL ENCOUNTER: Status: RESOLVED | Noted: 2019-04-13 | Resolved: 2021-04-04

## 2021-04-04 PROBLEM — E11.9 CONTROLLED TYPE 2 DIABETES MELLITUS WITHOUT COMPLICATION, WITHOUT LONG-TERM CURRENT USE OF INSULIN (HCC): Status: ACTIVE | Noted: 2021-04-04

## 2021-04-04 PROBLEM — Z91.81 RISK FOR FALLS: Status: ACTIVE | Noted: 2021-04-04

## 2021-04-04 PROBLEM — R73.03 PREDIABETES: Status: RESOLVED | Noted: 2019-02-13 | Resolved: 2021-04-04

## 2021-04-04 PROBLEM — R79.89 ELEVATED SERUM CREATININE: Status: RESOLVED | Noted: 2019-05-28 | Resolved: 2021-04-04

## 2021-04-04 PROBLEM — N40.0 BENIGN PROSTATIC HYPERPLASIA WITHOUT LOWER URINARY TRACT SYMPTOMS: Status: ACTIVE | Noted: 2021-04-04

## 2021-04-04 PROBLEM — Z86.718 ENCOUNTER FOR FOLLOW-UP OF ACUTE DEEP VEIN THROMBOSIS (DVT) OF RIGHT LOWER EXTREMITY: Status: RESOLVED | Noted: 2019-05-28 | Resolved: 2021-04-04

## 2021-04-04 PROBLEM — E78.00 PURE HYPERCHOLESTEROLEMIA: Status: ACTIVE | Noted: 2021-04-04

## 2021-04-04 PROBLEM — I89.0 LYMPHEDEMA OF BOTH LOWER EXTREMITIES: Status: ACTIVE | Noted: 2021-04-04

## 2021-04-04 PROBLEM — Z09 ENCOUNTER FOR FOLLOW-UP OF ACUTE DEEP VEIN THROMBOSIS (DVT) OF RIGHT LOWER EXTREMITY: Status: RESOLVED | Noted: 2019-05-28 | Resolved: 2021-04-04

## 2021-04-04 PROBLEM — R53.81 PHYSICAL DECONDITIONING: Status: ACTIVE | Noted: 2021-04-04

## 2021-04-04 PROBLEM — S72.001A CLOSED FRACTURE OF RIGHT HIP (HCC): Status: RESOLVED | Noted: 2019-04-13 | Resolved: 2021-04-04

## 2021-04-04 PROBLEM — R35.0 BENIGN PROSTATIC HYPERPLASIA WITH URINARY FREQUENCY: Status: RESOLVED | Noted: 2018-09-13 | Resolved: 2021-04-04

## 2021-04-04 PROBLEM — S72.001A CLOSED FRACTURE OF RIGHT HIP, INITIAL ENCOUNTER (HCC): Status: RESOLVED | Noted: 2019-04-13 | Resolved: 2021-04-04

## 2021-04-04 PROBLEM — N40.1 BENIGN PROSTATIC HYPERPLASIA WITH URINARY FREQUENCY: Status: RESOLVED | Noted: 2018-09-13 | Resolved: 2021-04-04

## 2021-04-05 ENCOUNTER — IMMUNIZATION (OUTPATIENT)
Dept: LAB | Age: 71
End: 2021-04-05
Attending: HOSPITALIST
Payer: MEDICARE

## 2021-04-05 ENCOUNTER — TELEPHONE (OUTPATIENT)
Dept: INTERNAL MEDICINE CLINIC | Facility: CLINIC | Age: 71
End: 2021-04-05

## 2021-04-05 DIAGNOSIS — Z23 NEED FOR VACCINATION: Primary | ICD-10-CM

## 2021-04-05 PROCEDURE — 0001A SARSCOV2 VAC 30MCG/0.3ML IM: CPT

## 2021-04-05 NOTE — TELEPHONE ENCOUNTER
Request for Genetic Cardiovascular Risk Assessment test form/order received and placed in Dr. Carmina Ramirez in basket to complete.

## 2021-04-12 RX ORDER — FINASTERIDE 5 MG/1
TABLET, FILM COATED ORAL
Qty: 90 TABLET | Refills: 1 | Status: SHIPPED | OUTPATIENT
Start: 2021-04-12 | End: 2021-09-28

## 2021-04-21 ENCOUNTER — TELEPHONE (OUTPATIENT)
Dept: INTERNAL MEDICINE CLINIC | Facility: CLINIC | Age: 71
End: 2021-04-21

## 2021-04-21 ENCOUNTER — NURSE TRIAGE (OUTPATIENT)
Dept: INTERNAL MEDICINE CLINIC | Facility: CLINIC | Age: 71
End: 2021-04-21

## 2021-04-21 NOTE — TELEPHONE ENCOUNTER
Advised pt that doctor recommended an appointment to further evaluate fatigue. Likely not due to vaccine. Pt declined an appt. \"I just want you to tell me why I'm tired.  \"  Pt wants to know if he should get the 2nd vaccine if the first one made him fe

## 2021-04-21 NOTE — TELEPHONE ENCOUNTER
He had been inactive lying and sitting down most of the day.  This is he what he told me that's I had ordered physical therapy for him on last ov

## 2021-04-21 NOTE — TELEPHONE ENCOUNTER
Action Requested: Summary for Provider     []  Critical Lab, Recommendations Needed  [] Need Additional Advice  []   FYI    []   Need Orders  [] Need Medications Sent to Pharmacy  []  Other     SUMMARY: I spoke at length with the pt regarding the effects

## 2021-04-23 NOTE — TELEPHONE ENCOUNTER
Patient states \"someone just woke up me up from your office. \"  Patient again asking if ok to have second vaccine.   Informed him it is ok and to follow up with having PT per Dr. Lety Meadows.

## 2021-04-26 ENCOUNTER — IMMUNIZATION (OUTPATIENT)
Dept: LAB | Age: 71
End: 2021-04-26
Attending: HOSPITALIST
Payer: MEDICARE

## 2021-04-26 DIAGNOSIS — Z23 NEED FOR VACCINATION: Primary | ICD-10-CM

## 2021-04-26 PROCEDURE — 0002A SARSCOV2 VAC 30MCG/0.3ML IM: CPT

## 2021-05-03 ENCOUNTER — TELEPHONE (OUTPATIENT)
Dept: INTERNAL MEDICINE CLINIC | Facility: CLINIC | Age: 71
End: 2021-05-03

## 2021-05-03 NOTE — TELEPHONE ENCOUNTER
RN Triage, please assist patient with questions he has related to COVID vaccine. Thank you. Refer to message below.

## 2021-05-03 NOTE — TELEPHONE ENCOUNTER
Dr. Zain Calle, do you still have the [facility] form for [place]. I remember seeing it and putting it in you basket.

## 2021-05-03 NOTE — TELEPHONE ENCOUNTER
Patient has come questions regarding Pfzier vaccine. Wants to know what its made up of and all the side effects. He states he's been very tired since he received it on 4/26.  Please call back

## 2021-05-04 NOTE — TELEPHONE ENCOUNTER
Patient contacted office. States he received the 2nd pfizer vaccine on Monday 4-. Patient wants to know what was in his Sinclair Peter syringe. States he still feels tired.   He wants to know how long his vaccine will last. Informed him RN does not know ho

## 2021-05-28 ENCOUNTER — TELEPHONE (OUTPATIENT)
Dept: INTERNAL MEDICINE CLINIC | Facility: CLINIC | Age: 71
End: 2021-05-28

## 2021-05-30 RX ORDER — FUROSEMIDE 40 MG/1
TABLET ORAL
Qty: 90 TABLET | Refills: 1 | Status: SHIPPED | OUTPATIENT
Start: 2021-05-30

## 2021-06-30 RX ORDER — METOPROLOL TARTRATE 50 MG/1
TABLET, FILM COATED ORAL
Qty: 180 TABLET | Refills: 1 | Status: SHIPPED | OUTPATIENT
Start: 2021-06-30

## 2021-06-30 RX ORDER — FAMOTIDINE 20 MG/1
TABLET ORAL
Qty: 180 TABLET | Refills: 1 | Status: SHIPPED | OUTPATIENT
Start: 2021-06-30

## 2021-06-30 RX ORDER — AMLODIPINE BESYLATE 5 MG/1
TABLET ORAL
Qty: 90 TABLET | Refills: 1 | Status: SHIPPED | OUTPATIENT
Start: 2021-06-30

## 2021-06-30 RX ORDER — ATORVASTATIN CALCIUM 20 MG/1
TABLET, FILM COATED ORAL
Qty: 90 TABLET | Refills: 1 | Status: SHIPPED | OUTPATIENT
Start: 2021-06-30

## 2021-07-06 ENCOUNTER — TELEPHONE (OUTPATIENT)
Dept: INTERNAL MEDICINE CLINIC | Facility: CLINIC | Age: 71
End: 2021-07-06

## 2021-07-07 NOTE — TELEPHONE ENCOUNTER
AT Medicare Progress Note dated. 6/30/21 received and placed in Dr. Joseph Joseph in basket to complete.

## 2021-07-08 ENCOUNTER — MED REC SCAN ONLY (OUTPATIENT)
Dept: INTERNAL MEDICINE CLINIC | Facility: CLINIC | Age: 71
End: 2021-07-08

## 2021-07-21 NOTE — TELEPHONE ENCOUNTER
[facility] physical Therapy Medicare Discharge Summary 7/9/21 completed by Dr. Broderick Clark, faxed back and confirmed sent.

## 2021-07-27 ENCOUNTER — NURSE TRIAGE (OUTPATIENT)
Dept: INTERNAL MEDICINE CLINIC | Facility: CLINIC | Age: 71
End: 2021-07-27

## 2021-07-27 NOTE — TELEPHONE ENCOUNTER
Action Requested: Summary for Provider     []  Critical Lab, Recommendations Needed  [x] Need Additional Advice  []   FYI    []   Need Orders  [x] Need Medications Sent to Pharmacy  []  Other     SUMMARY:   Spoke with pt,  verified, pt stated his been t

## 2021-07-28 NOTE — TELEPHONE ENCOUNTER
Patient informed of message below. Patient states \"I dont have covid, oh my god, did he say that\". Patient then stated \"you guys are all brainless\". RN ended call. Routed as CYNDI.

## 2021-07-28 NOTE — TELEPHONE ENCOUNTER
Pt calling back and asking if he can take something stronger than coricidin.   Dr Kruger Se message given and pt states \"I don't have covid and common sense says if I stop the coricidin and I get worse I should start it again\"    Advised pt if he is re

## 2021-08-13 ENCOUNTER — TELEPHONE (OUTPATIENT)
Dept: INTERNAL MEDICINE CLINIC | Facility: CLINIC | Age: 71
End: 2021-08-13

## 2021-08-13 ENCOUNTER — HOSPITAL ENCOUNTER (OUTPATIENT)
Age: 71
Discharge: HOME | End: 2021-08-13
Payer: MEDICARE

## 2021-08-13 ENCOUNTER — APPOINTMENT (OUTPATIENT)
Dept: GENERAL RADIOLOGY | Age: 71
End: 2021-08-13
Attending: NURSE PRACTITIONER
Payer: MEDICARE

## 2021-08-13 VITALS
WEIGHT: 210 LBS | TEMPERATURE: 98 F | HEIGHT: 65 IN | HEART RATE: 68 BPM | OXYGEN SATURATION: 95 % | BODY MASS INDEX: 34.99 KG/M2 | DIASTOLIC BLOOD PRESSURE: 85 MMHG | SYSTOLIC BLOOD PRESSURE: 128 MMHG | RESPIRATION RATE: 18 BRPM

## 2021-08-13 DIAGNOSIS — J18.9 PNEUMONITIS: Primary | ICD-10-CM

## 2021-08-13 PROCEDURE — 71046 X-RAY EXAM CHEST 2 VIEWS: CPT | Performed by: NURSE PRACTITIONER

## 2021-08-13 PROCEDURE — 99213 OFFICE O/P EST LOW 20 MIN: CPT | Performed by: NURSE PRACTITIONER

## 2021-08-13 RX ORDER — DOXYCYCLINE HYCLATE 100 MG/1
100 CAPSULE ORAL 2 TIMES DAILY
Qty: 14 CAPSULE | Refills: 0 | Status: SHIPPED | OUTPATIENT
Start: 2021-08-13 | End: 2021-08-20

## 2021-08-13 RX ORDER — CEFDINIR 300 MG/1
300 CAPSULE ORAL 2 TIMES DAILY
Qty: 14 CAPSULE | Refills: 0 | Status: SHIPPED | OUTPATIENT
Start: 2021-08-13 | End: 2021-08-20

## 2021-08-13 NOTE — ED PROVIDER NOTES
Patient Seen in: [facility]      History   Patient presents with:  Cough/URI    Stated Complaint: chest pain     HPI/Subjective:   HPI    This is a well-appearing 10year-old male who presents for a cough.   Patient states he had a cough over t (Temporal)   Resp 18   Ht 165.1 cm (5' 5\")   Wt 95.3 kg   SpO2 95%   BMI 34.95 kg/m²         Physical Exam  Vitals and nursing note reviewed. Constitutional:       General: He is awake. He is not in acute distress. Appearance: Normal appearance.  He cardiomegaly. Tortuous aorta. 2. Chronic left basilar parenchymal abnormality with slight progression of subsegmental atelectasis and/or scarring in the left perihilar region. 3. Nonspecific right basilar parenchymal abnormality has developed.   Mild righ

## 2021-08-13 NOTE — TELEPHONE ENCOUNTER
Pt called stated he have been taking mucinex  , have taken 9 pills, 1 twice a day ,continue to have a cough, clear phlegm, advised UC  To be evaluated, pt agreed

## 2021-08-13 NOTE — ED INITIAL ASSESSMENT (HPI)
Cough for a week. Pt has been using mucinex that cleared up his mucus. No fever.   No chest pain or sob

## 2021-08-17 ENCOUNTER — TELEPHONE (OUTPATIENT)
Dept: INTERNAL MEDICINE CLINIC | Facility: CLINIC | Age: 71
End: 2021-08-17

## 2021-08-17 NOTE — TELEPHONE ENCOUNTER
Seen at [facility] 8/13/2021  Taking Doxycycline, Cefdinir and Mucinex OTC. Was advised by  to give an update and make a f/u. Had an upset stomach yesterday, spit up one pill with phlegm. Patient states he feels much better. Stopped taking Mucinex.   No co

## 2021-08-18 ENCOUNTER — TELEPHONE (OUTPATIENT)
Dept: INTERNAL MEDICINE CLINIC | Facility: CLINIC | Age: 71
End: 2021-08-18

## 2021-08-18 NOTE — TELEPHONE ENCOUNTER
Please also see condition update 8/17/21. Pt was seen in UC on 8/13/21 and was prescribed medications. Pt states he tried taking doxycycline and cefdinir again with food, however, vomited bile again.  Pt states he feels weak and nauseous and does not wa

## 2021-08-20 ENCOUNTER — TELEPHONE (OUTPATIENT)
Dept: INTERNAL MEDICINE CLINIC | Facility: CLINIC | Age: 71
End: 2021-08-20

## 2021-08-20 DIAGNOSIS — Z20.822 ENCOUNTER FOR LABORATORY TESTING FOR COVID-19 VIRUS: Primary | ICD-10-CM

## 2021-08-20 NOTE — TELEPHONE ENCOUNTER
Chart reviewed. Seen in urgent care 8/13/2021 with a 1 week history of cough. Chest x-ray :   Impression   CONCLUSION:   1. Borderline cardiomegaly.  Tortuous aorta.    2. Chronic left basilar parenchymal abnormality with slight progression of subsegmenta

## 2021-08-20 NOTE — TELEPHONE ENCOUNTER
lidia Hargrove. Pt called asking why his appointment needs to be a video appointment. Informed him of Dr. Diane Obrien message. Pt states he cannot do a virtual visit because he only has a flip phone and does not have a computer.   Pt states symptoms have imp

## 2021-08-22 ENCOUNTER — HOSPITAL ENCOUNTER (EMERGENCY)
Facility: HOSPITAL | Age: 71
Discharge: HOME | End: 2021-08-22
Attending: EMERGENCY MEDICINE
Payer: MEDICARE

## 2021-08-22 VITALS
RESPIRATION RATE: 18 BRPM | SYSTOLIC BLOOD PRESSURE: 133 MMHG | DIASTOLIC BLOOD PRESSURE: 70 MMHG | OXYGEN SATURATION: 98 % | TEMPERATURE: 98 F | HEART RATE: 77 BPM

## 2021-08-22 DIAGNOSIS — Z11.52 ENCOUNTER FOR SCREENING FOR COVID-19: Primary | ICD-10-CM

## 2021-08-22 LAB — SARS-COV-2 RNA RESP QL NAA+PROBE: NOT DETECTED

## 2021-08-22 PROCEDURE — 99283 EMERGENCY DEPT VISIT LOW MDM: CPT

## 2021-08-22 NOTE — ED PROVIDER NOTES
Patient Seen in: [facility] Emergency Department    History   Patient presents with:  Testing      HPI    67-year-old male presents the ER for COVID-19 testing.   Patient states that his primary care physician requires a negative Covid swab prior to review of systems are mentioned in the HPI  All other organ systems are reviewed and are negative. Constitutional and vital signs reviewed.       Social History and Family History elements reviewed from today, pertinent positives to the presenting proble Thought Content: Thought content normal.         Judgment: Judgment normal.         ED Course        Labs Reviewed   RAPID SARS-COV-2 BY PCR - Normal         Imaging Results Available and Reviewed while in ED: No results found.   ED Medications Admini

## 2021-08-22 NOTE — ED INITIAL ASSESSMENT (HPI)
Patient PCP MD almeida asked patient to come to the emergency room for a rapid covid before he comes to the office tomorrow for an annual. No symptoms.

## 2021-08-23 ENCOUNTER — VIRTUAL PHONE E/M (OUTPATIENT)
Dept: INTERNAL MEDICINE CLINIC | Facility: CLINIC | Age: 71
End: 2021-08-23
Payer: MEDICARE

## 2021-08-23 DIAGNOSIS — J45.41 MODERATE PERSISTENT ASTHMA WITH ACUTE EXACERBATION: Primary | ICD-10-CM

## 2021-08-23 PROCEDURE — 99443 PHONE E/M BY PHYS 21-30 MIN: CPT | Performed by: INTERNAL MEDICINE

## 2021-08-23 RX ORDER — CODEINE PHOSPHATE AND GUAIFENESIN 10; 100 MG/5ML; MG/5ML
5 SOLUTION ORAL EVERY 6 HOURS PRN
Qty: 120 ML | Refills: 0 | Status: SHIPPED | OUTPATIENT
Start: 2021-08-23 | End: 2021-09-29

## 2021-08-23 RX ORDER — ALBUTEROL SULFATE 90 UG/1
2 AEROSOL, METERED RESPIRATORY (INHALATION) EVERY 6 HOURS PRN
Qty: 3 EACH | Refills: 3 | Status: SHIPPED | OUTPATIENT
Start: 2021-08-23

## 2021-08-23 NOTE — TELEPHONE ENCOUNTER
Appt? Not sure what the question is    See my last note.  I understand he tested negative, but he is actively coughing and covid testing is not 100% accurate especially with delta variant being so prevalent, thus he should be converted to a video visit, referred to UC, or can see another provide

## 2021-08-23 NOTE — TELEPHONE ENCOUNTER
Verified pt name and . Reviewed doctor's recommendations as noted below. Pt states he would like to come in to the office but will do a phone visit as advised. Pt states he can't do a video visit as he only has a flip phone and no lap top or tablet.

## 2021-08-23 NOTE — PROGRESS NOTES
Virtual Telephone Check-In    Lauritanixon Marsh verbally consents to a [facility] service on 08/23/21. Patient understands and accepts financial responsibility for any deductible, co-insurance and/or co-pays associated with this service. of right lower extremity    • Hip fracture, right ([facility] [address]     2019   • Hyperlipidemia    • Moderate persistent asthma with acute exacerbation 8/23/2021   • Osteoarthritis    • Scoliosis    • Unspecified essential hypertension       Reviewed Allergies    Ace • Sennosides 15 MG Oral Tab Take 1 tablet (15 mg total) by mouth daily as needed. Take by mouth. (Patient not taking: Reported on 4/2/2021 ) 90 tablet 0   • tamsulosin HCl 0.4 MG Oral Cap Take 2 capsules (0.8 mg total) by mouth daily.  180 capsule 3   • M few days. Follow up: No follow-ups on file. Duration of service, in minutes: 23  Please note that the following visit was completed using telephone communications.   This has been done in good darius to provide continuity of care in the best interest

## 2021-08-23 NOTE — TELEPHONE ENCOUNTER
We spoke with patient for 20 min re: his appointment. He is coming in for respiratory plus he has other concerns.  He was advised because he is actively coughing he would need to be a video visit or he can go to urgent care for re-evaluation and re-treatmen

## 2021-08-24 ENCOUNTER — LAB REQUISITION (OUTPATIENT)
Dept: SURGERY | Age: 71
End: 2021-08-24
Payer: MEDICARE

## 2021-08-24 DIAGNOSIS — Z01.818 PREOP EXAMINATION: ICD-10-CM

## 2021-08-25 LAB — SARS-COV-2 RNA RESP QL NAA+PROBE: NOT DETECTED

## 2021-09-08 DIAGNOSIS — J45.41 MODERATE PERSISTENT ASTHMA WITH ACUTE EXACERBATION: ICD-10-CM

## 2021-09-08 NOTE — TELEPHONE ENCOUNTER
Please review. Protocol failed / No protocol.     Requested Prescriptions   Pending Prescriptions Disp Refills    VIRTUSSIN A/C 100-10 MG/5ML Oral Solution [Pharmacy Med Name: VIRTUSSIN BERNARDA OLIVERA (SF&ALCOHOL FREE)] 120 mL 0     Sig: TAKE 5 ML BY MOUTH EVERY 6

## 2021-09-28 RX ORDER — FINASTERIDE 5 MG/1
TABLET, FILM COATED ORAL
Qty: 90 TABLET | Refills: 1 | Status: SHIPPED | OUTPATIENT
Start: 2021-09-28

## 2021-09-29 ENCOUNTER — TELEPHONE (OUTPATIENT)
Dept: INTERNAL MEDICINE CLINIC | Facility: CLINIC | Age: 71
End: 2021-09-29

## 2021-09-29 RX ORDER — CODEINE PHOSPHATE AND GUAIFENESIN 10; 100 MG/5ML; MG/5ML
5 SOLUTION ORAL EVERY 6 HOURS PRN
Qty: 120 ML | Refills: 0 | Status: SHIPPED | OUTPATIENT
Start: 2021-09-29

## 2021-09-29 NOTE — TELEPHONE ENCOUNTER
Advised patient of note below. Patient is grateful for the refill on the cough med. Advised needs follow up.

## 2021-09-29 NOTE — TELEPHONE ENCOUNTER
He has severe asthma based on his pft done last year. He was supposed to see pulmonologist and I dont think he did see them. His asthma is the reason why he continues to have intermittent cough and flare up.    He needs use steroid inhaler given to him by

## 2021-09-29 NOTE — TELEPHONE ENCOUNTER
Pt calling and states he had a virtual visit with Dr Jose Mercado. Metta Manifold a month ago and was feeling better but now cough has returned. He denies wheezing, difficulty breathing, coughing up sputum, fever. Pt is requesting a refill on cough syrup.  Please advise, ludin

## 2021-11-30 ENCOUNTER — TELEPHONE (OUTPATIENT)
Dept: INTERNAL MEDICINE CLINIC | Facility: CLINIC | Age: 71
End: 2021-11-30

## 2021-11-30 DIAGNOSIS — J45.41 MODERATE PERSISTENT ASTHMA WITH ACUTE EXACERBATION: ICD-10-CM

## 2021-11-30 NOTE — TELEPHONE ENCOUNTER
Initial Evaluation dated 11/29/2021 from [facility] received, placed on Dr Pito Ohara desk for review and signature.

## 2021-12-01 RX ORDER — FLUTICASONE PROPIONATE AND SALMETEROL XINAFOATE 115; 21 UG/1; UG/1
AEROSOL, METERED RESPIRATORY (INHALATION)
Qty: 36 G | Refills: 0 | Status: SHIPPED | OUTPATIENT
Start: 2021-12-01

## 2021-12-13 ENCOUNTER — TELEPHONE (OUTPATIENT)
Dept: INTERNAL MEDICINE CLINIC | Facility: CLINIC | Age: 71
End: 2021-12-13

## 2021-12-13 DIAGNOSIS — I89.0 LYMPHEDEMA OF BOTH LOWER EXTREMITIES: Primary | ICD-10-CM

## 2022-01-11 ENCOUNTER — TELEPHONE (OUTPATIENT)
Dept: INTERNAL MEDICINE CLINIC | Facility: CLINIC | Age: 72
End: 2022-01-11

## 2022-01-11 ENCOUNTER — MED REC SCAN ONLY (OUTPATIENT)
Dept: INTERNAL MEDICINE CLINIC | Facility: CLINIC | Age: 72
End: 2022-01-11

## 2022-01-11 NOTE — TELEPHONE ENCOUNTER
[facility] signed and faxed to [phone], confirmation recieved.

## 2022-01-13 ENCOUNTER — TELEPHONE (OUTPATIENT)
Dept: CASE MANAGEMENT | Age: 72
End: 2022-01-13

## 2022-01-13 NOTE — TELEPHONE ENCOUNTER
Physical therapy progress notes DOS 1/10/2022 signed and faxed to Samina  at [phone], confirmation received.

## 2022-01-13 NOTE — TELEPHONE ENCOUNTER
Dr. Ulices Torres,    The patient called requesting the name of another ophthalmologist to see for cataracts, he does not want to wait to see Dr. Moustapha Noble. Please call patient with specialist names. Thank you.   Fredis Lara

## 2022-01-19 ENCOUNTER — OFFICE VISIT (OUTPATIENT)
Dept: PODIATRY CLINIC | Facility: CLINIC | Age: 72
End: 2022-01-19
Payer: MEDICARE

## 2022-01-19 DIAGNOSIS — M79.675 PAIN IN TOES OF BOTH FEET: Primary | ICD-10-CM

## 2022-01-19 DIAGNOSIS — M79.674 PAIN IN TOES OF BOTH FEET: Primary | ICD-10-CM

## 2022-01-19 DIAGNOSIS — B35.1 ONYCHOMYCOSIS: ICD-10-CM

## 2022-01-19 PROCEDURE — 11721 DEBRIDE NAIL 6 OR MORE: CPT | Performed by: PODIATRIST

## 2022-01-19 NOTE — PROGRESS NOTES
HPI:    Patient ID: Celi Perea is a 71year old male. 78-year-old male presents as a new patient to me and he states that he is self-referred. He states that he is here for me to care for his toenails.   He does not remember the last time they were capsule 3   • MULTIPLE VITAMINS-MINERALS OR 1 daily     • Ciclopirox 8 % External Solution Apply 1 Application topically nightly. To nails 100 mL 5     Allergies:   Ace Inhibitors          Coughing   PHYSICAL EXAM:     On physical exam he is walking with a

## 2022-02-16 ENCOUNTER — TELEPHONE (OUTPATIENT)
Dept: INTERNAL MEDICINE CLINIC | Facility: CLINIC | Age: 72
End: 2022-02-16

## 2022-02-16 NOTE — TELEPHONE ENCOUNTER
Plan of care dated 2/4/2022 from [facility] received, placed on Dr Leo López desk for review and signature.

## 2022-02-17 NOTE — TELEPHONE ENCOUNTER
Plan of care dated 2/4/2022 signed and faxed to Yasmeenmsbishop  at [phone], confirmation received. Copy sent to scanning department.

## 2022-03-08 NOTE — TELEPHONE ENCOUNTER
Progress note dated 3/7/2022 received from [facility], placed on Dr Moody Lilly desrosalie for review signature.

## 2022-03-10 NOTE — TELEPHONE ENCOUNTER
Progress note dated 3/7/2022 signed and faxed to Samina Robles at [phone], confirmation received.

## 2022-03-23 RX ORDER — ATORVASTATIN CALCIUM 20 MG/1
20 TABLET, FILM COATED ORAL NIGHTLY
Qty: 90 TABLET | Refills: 1 | Status: SHIPPED | OUTPATIENT
Start: 2022-03-23 | End: 2022-09-22

## 2022-03-23 NOTE — TELEPHONE ENCOUNTER
Refill passed per [facility] protocol.      Requested Prescriptions   Pending Prescriptions Disp Refills    ATORVASTATIN 20 MG Oral Tab [Pharmacy Med Name: ATORVASTATIN 20MG TABLETS] 90 tablet 1     Sig: TAKE 1 TABLET(20 MG) BY MOUTH EVERY NIGHT        Cholesterol Medication Protocol Passed - 3/23/2022  3:47 AM        Passed - ALT in past 12 months        Passed - LDL in past 12 months        Passed - Last ALT < 80       Lab Results   Component Value Date    ALT 13 (L) 04/02/2021             Passed - Last LDL < 130     Lab Results   Component Value Date    LDL 80 04/02/2021               Passed - Appointment in past 12 or next 3 months                Recent Outpatient Visits              2 months ago Pain in toes of both [facility], [address]    Office Visit    3 months ago Lymphedema of both lower extremities    Vascular - [address] Anton Alonzo MD    Office Visit    7 months ago Moderate persistent asthma with acute exacerbation    Yovanny Dorman MD    Virtual Phone E/M    11 months ago Nicolas Alegria annual wellness visit, subsequent    Darleen Dorman MD    Office Visit    1 year ago Lymphedema of both lower extremities    Vascular - Anton Myers MD    Office Visit             Future Appointments         Provider Department Appt Notes    In 1 week Anila Walker MD [facility] ee cataracts, informed of policy, call at [phone] for wait list    In 3 weeks Mecca Slaughter [facility] Yasmeen Chávez 3 mo nail care

## 2022-03-30 RX ORDER — FINASTERIDE 5 MG/1
TABLET, FILM COATED ORAL
Qty: 90 TABLET | Refills: 0 | Status: SHIPPED | OUTPATIENT
Start: 2022-03-30 | End: 2022-06-24

## 2022-03-30 RX ORDER — FAMOTIDINE 20 MG/1
TABLET, FILM COATED ORAL
Qty: 180 TABLET | Refills: 0 | Status: SHIPPED | OUTPATIENT
Start: 2022-03-30 | End: 2022-06-24

## 2022-03-30 RX ORDER — METOPROLOL TARTRATE 50 MG/1
TABLET, FILM COATED ORAL
Qty: 180 TABLET | Refills: 0 | Status: SHIPPED | OUTPATIENT
Start: 2022-03-30 | End: 2022-06-24

## 2022-03-30 RX ORDER — AMLODIPINE BESYLATE 5 MG/1
TABLET ORAL
Qty: 90 TABLET | Refills: 0 | Status: SHIPPED | OUTPATIENT
Start: 2022-03-30 | End: 2022-06-24

## 2022-03-30 RX ORDER — FUROSEMIDE 40 MG/1
TABLET ORAL
Qty: 90 TABLET | Refills: 0 | Status: SHIPPED | OUTPATIENT
Start: 2022-03-30 | End: 2022-06-24

## 2022-03-30 NOTE — TELEPHONE ENCOUNTER
1st attempt - Reading Roomt message sent for patient to contact the office to schedule an appointment; see notes below.

## 2022-04-01 NOTE — TELEPHONE ENCOUNTER
2nd attempt - patient was still in bed at 8:41a, asks us to call him back next time noon or later and he will speak to us. :-)

## 2022-04-06 ENCOUNTER — TELEPHONE (OUTPATIENT)
Dept: INTERNAL MEDICINE CLINIC | Facility: CLINIC | Age: 72
End: 2022-04-06

## 2022-04-06 NOTE — TELEPHONE ENCOUNTER
Patient called requesting a call back prior to appointment on Monday April 11,2022. Patient would like to know if he needs his labs ordered prior to visit.

## 2022-04-06 NOTE — TELEPHONE ENCOUNTER
Dr. Yasmeen Panda, patient is requesting for lab orders if needed.  Scheduled appointment for 4/11/22

## 2022-04-14 ENCOUNTER — LAB ENCOUNTER (OUTPATIENT)
Dept: LAB | Age: 72
End: 2022-04-14
Attending: INTERNAL MEDICINE
Payer: MEDICARE

## 2022-04-14 ENCOUNTER — OFFICE VISIT (OUTPATIENT)
Dept: INTERNAL MEDICINE CLINIC | Facility: CLINIC | Age: 72
End: 2022-04-14
Payer: MEDICARE

## 2022-04-14 VITALS
HEIGHT: 65 IN | WEIGHT: 215 LBS | SYSTOLIC BLOOD PRESSURE: 108 MMHG | HEART RATE: 70 BPM | DIASTOLIC BLOOD PRESSURE: 66 MMHG | BODY MASS INDEX: 35.82 KG/M2

## 2022-04-14 DIAGNOSIS — E78.2 MIXED HYPERLIPIDEMIA: Primary | ICD-10-CM

## 2022-04-14 DIAGNOSIS — I10 HYPERTENSION GOAL BP (BLOOD PRESSURE) < 130/80: ICD-10-CM

## 2022-04-14 DIAGNOSIS — R53.81 PHYSICAL DECONDITIONING: ICD-10-CM

## 2022-04-14 DIAGNOSIS — Z12.5 PROSTATE CANCER SCREENING: ICD-10-CM

## 2022-04-14 DIAGNOSIS — R42 VERTIGO: ICD-10-CM

## 2022-04-14 DIAGNOSIS — E78.00 PURE HYPERCHOLESTEROLEMIA: ICD-10-CM

## 2022-04-14 DIAGNOSIS — I89.0 LYMPHEDEMA OF BOTH LOWER EXTREMITIES: ICD-10-CM

## 2022-04-14 DIAGNOSIS — Z00.00 MEDICARE ANNUAL WELLNESS VISIT, SUBSEQUENT: ICD-10-CM

## 2022-04-14 DIAGNOSIS — E11.9 CONTROLLED TYPE 2 DIABETES MELLITUS WITHOUT COMPLICATION, WITHOUT LONG-TERM CURRENT USE OF INSULIN (HCC): ICD-10-CM

## 2022-04-14 LAB
ALBUMIN SERPL-MCNC: 3 G/DL (ref 3.4–5)
ALBUMIN/GLOB SERPL: 0.7 {RATIO} (ref 1–2)
ALP LIVER SERPL-CCNC: 100 U/L
ALT SERPL-CCNC: 19 U/L
ANION GAP SERPL CALC-SCNC: 2 MMOL/L (ref 0–18)
AST SERPL-CCNC: 19 U/L (ref 15–37)
BASOPHILS # BLD AUTO: 0.02 X10(3) UL (ref 0–0.2)
BASOPHILS NFR BLD AUTO: 0.3 %
BILIRUB SERPL-MCNC: 1.1 MG/DL (ref 0.1–2)
BUN BLD-MCNC: 14 MG/DL (ref 7–18)
BUN/CREAT SERPL: 11.2 (ref 10–20)
CALCIUM BLD-MCNC: 8 MG/DL (ref 8.5–10.1)
CHLORIDE SERPL-SCNC: 104 MMOL/L (ref 98–112)
CHOLEST SERPL-MCNC: 150 MG/DL (ref ?–200)
CO2 SERPL-SCNC: 33 MMOL/L (ref 21–32)
COMPLEXED PSA SERPL-MCNC: 0.33 NG/ML (ref ?–4)
CREAT BLD-MCNC: 1.25 MG/DL
DEPRECATED RDW RBC AUTO: 46.5 FL (ref 35.1–46.3)
EOSINOPHIL # BLD AUTO: 0.73 X10(3) UL (ref 0–0.7)
EOSINOPHIL NFR BLD AUTO: 10.9 %
ERYTHROCYTE [DISTWIDTH] IN BLOOD BY AUTOMATED COUNT: 13.5 % (ref 11–15)
EST. AVERAGE GLUCOSE BLD GHB EST-MCNC: 137 MG/DL (ref 68–126)
FASTING PATIENT LIPID ANSWER: YES
FASTING STATUS PATIENT QL REPORTED: YES
GLOBULIN PLAS-MCNC: 4.2 G/DL (ref 2.8–4.4)
GLUCOSE BLD-MCNC: 90 MG/DL (ref 70–99)
HBA1C MFR BLD: 6.4 % (ref ?–5.7)
HCT VFR BLD AUTO: 41.9 %
HDLC SERPL-MCNC: 47 MG/DL (ref 40–59)
HGB BLD-MCNC: 13.4 G/DL
IMM GRANULOCYTES # BLD AUTO: 0.02 X10(3) UL (ref 0–1)
IMM GRANULOCYTES NFR BLD: 0.3 %
LDLC SERPL CALC-MCNC: 83 MG/DL (ref ?–100)
LYMPHOCYTES # BLD AUTO: 1.06 X10(3) UL (ref 1–4)
LYMPHOCYTES NFR BLD AUTO: 15.8 %
MCH RBC QN AUTO: 30 PG (ref 26–34)
MCHC RBC AUTO-ENTMCNC: 32 G/DL (ref 31–37)
MCV RBC AUTO: 93.9 FL
MONOCYTES # BLD AUTO: 0.57 X10(3) UL (ref 0.1–1)
MONOCYTES NFR BLD AUTO: 8.5 %
NEUTROPHILS # BLD AUTO: 4.29 X10 (3) UL (ref 1.5–7.7)
NEUTROPHILS # BLD AUTO: 4.29 X10(3) UL (ref 1.5–7.7)
NEUTROPHILS NFR BLD AUTO: 64.2 %
NONHDLC SERPL-MCNC: 103 MG/DL (ref ?–130)
OSMOLALITY SERPL CALC.SUM OF ELEC: 288 MOSM/KG (ref 275–295)
PLATELET # BLD AUTO: 218 10(3)UL (ref 150–450)
POTASSIUM SERPL-SCNC: 4.6 MMOL/L (ref 3.5–5.1)
PROT SERPL-MCNC: 7.2 G/DL (ref 6.4–8.2)
RBC # BLD AUTO: 4.46 X10(6)UL
SODIUM SERPL-SCNC: 139 MMOL/L (ref 136–145)
TRIGL SERPL-MCNC: 107 MG/DL (ref 30–149)
VLDLC SERPL CALC-MCNC: 17 MG/DL (ref 0–30)
WBC # BLD AUTO: 6.7 X10(3) UL (ref 4–11)

## 2022-04-14 PROCEDURE — 80061 LIPID PANEL: CPT

## 2022-04-14 PROCEDURE — 36415 COLL VENOUS BLD VENIPUNCTURE: CPT

## 2022-04-14 PROCEDURE — 85025 COMPLETE CBC W/AUTO DIFF WBC: CPT

## 2022-04-14 PROCEDURE — 80053 COMPREHEN METABOLIC PANEL: CPT

## 2022-04-14 PROCEDURE — 99214 OFFICE O/P EST MOD 30 MIN: CPT | Performed by: INTERNAL MEDICINE

## 2022-04-14 PROCEDURE — 83036 HEMOGLOBIN GLYCOSYLATED A1C: CPT

## 2022-04-29 ENCOUNTER — TELEPHONE (OUTPATIENT)
Dept: INTERNAL MEDICINE CLINIC | Facility: CLINIC | Age: 72
End: 2022-04-29

## 2022-04-29 NOTE — TELEPHONE ENCOUNTER
Spoke with CATALINA Chavarria verified. She stated last time pt had compression stocking was 2 years ago. Before he got knee high 15-20 mm hg for chronic venous insufficiency  ICD I87.2. Pt will need a new order. Order pended. pls advise, thanks in advance.

## 2022-04-29 NOTE — TELEPHONE ENCOUNTER
Patient called requesting a new size for compression stockings to be sent to [place], [place]. Patient requesting size  XXL the next one lighter to his current prescription for stockings. He provided a name and phone number to speak with compression stocking specialist if needed.      [facility] # [phone]

## 2022-04-30 NOTE — TELEPHONE ENCOUNTER
Order faxed at [facility] [phone] today (fax number from pharmacy website)     Informed patient that his compression stocking order was faxed today to [facility],

## 2022-05-19 ENCOUNTER — MED REC SCAN ONLY (OUTPATIENT)
Dept: INTERNAL MEDICINE CLINIC | Facility: CLINIC | Age: 72
End: 2022-05-19

## 2022-05-19 ENCOUNTER — TELEPHONE (OUTPATIENT)
Dept: INTERNAL MEDICINE CLINIC | Facility: CLINIC | Age: 72
End: 2022-05-19

## 2022-05-19 NOTE — TELEPHONE ENCOUNTER
Medical clearance for surgery from [place] [address] [facility], Dr Julien Wu, received for cataract surgery on 2022 with MAC anesthesia.  Spoke with pt, verified , informed pt of above information, pt scheduled on 2022 at 2:30 pm with Dr Lata Moses.

## 2022-05-25 ENCOUNTER — OFFICE VISIT (OUTPATIENT)
Dept: INTERNAL MEDICINE CLINIC | Facility: CLINIC | Age: 72
End: 2022-05-25
Payer: MEDICARE

## 2022-05-25 ENCOUNTER — LAB ENCOUNTER (OUTPATIENT)
Dept: LAB | Age: 72
End: 2022-05-25
Attending: INTERNAL MEDICINE
Payer: MEDICARE

## 2022-05-25 VITALS
HEIGHT: 65 IN | BODY MASS INDEX: 35.87 KG/M2 | HEART RATE: 68 BPM | WEIGHT: 215.31 LBS | SYSTOLIC BLOOD PRESSURE: 104 MMHG | DIASTOLIC BLOOD PRESSURE: 66 MMHG | TEMPERATURE: 98 F

## 2022-05-25 DIAGNOSIS — H25.9 SENILE CATARACT OF RIGHT EYE, UNSPECIFIED AGE-RELATED CATARACT TYPE: ICD-10-CM

## 2022-05-25 DIAGNOSIS — Z86.718 HISTORY OF DVT (DEEP VEIN THROMBOSIS): ICD-10-CM

## 2022-05-25 DIAGNOSIS — N40.0 BENIGN PROSTATIC HYPERPLASIA WITHOUT LOWER URINARY TRACT SYMPTOMS: ICD-10-CM

## 2022-05-25 DIAGNOSIS — Z53.20 COLON CANCER SCREENING DECLINED: ICD-10-CM

## 2022-05-25 DIAGNOSIS — I89.0 LYMPHEDEMA OF BOTH LOWER EXTREMITIES: ICD-10-CM

## 2022-05-25 DIAGNOSIS — E78.00 PURE HYPERCHOLESTEROLEMIA: ICD-10-CM

## 2022-05-25 DIAGNOSIS — I82.401 DEEP VEIN THROMBOSIS (DVT) OF RIGHT LOWER EXTREMITY, UNSPECIFIED CHRONICITY, UNSPECIFIED VEIN (HCC): ICD-10-CM

## 2022-05-25 DIAGNOSIS — I10 ESSENTIAL HYPERTENSION: ICD-10-CM

## 2022-05-25 DIAGNOSIS — E11.9 CONTROLLED TYPE 2 DIABETES MELLITUS WITHOUT COMPLICATION, WITHOUT LONG-TERM CURRENT USE OF INSULIN (HCC): ICD-10-CM

## 2022-05-25 DIAGNOSIS — Z01.818 PREOP GENERAL PHYSICAL EXAM: Primary | ICD-10-CM

## 2022-05-25 LAB
CREAT UR-SCNC: 77.5 MG/DL
MICROALBUMIN UR-MCNC: 1.02 MG/DL
MICROALBUMIN/CREAT 24H UR-RTO: 13.2 UG/MG (ref ?–30)

## 2022-05-25 PROCEDURE — 99214 OFFICE O/P EST MOD 30 MIN: CPT | Performed by: INTERNAL MEDICINE

## 2022-05-25 PROCEDURE — 82043 UR ALBUMIN QUANTITATIVE: CPT

## 2022-05-25 PROCEDURE — 82570 ASSAY OF URINE CREATININE: CPT

## 2022-06-24 RX ORDER — FINASTERIDE 5 MG/1
5 TABLET, FILM COATED ORAL DAILY
Qty: 90 TABLET | Refills: 1 | Status: SHIPPED | OUTPATIENT
Start: 2022-06-24

## 2022-06-24 RX ORDER — FUROSEMIDE 40 MG/1
40 TABLET ORAL DAILY
Qty: 90 TABLET | Refills: 1 | Status: SHIPPED | OUTPATIENT
Start: 2022-06-24

## 2022-06-24 RX ORDER — FAMOTIDINE 20 MG/1
20 TABLET, FILM COATED ORAL 2 TIMES DAILY
Qty: 180 TABLET | Refills: 1 | Status: SHIPPED | OUTPATIENT
Start: 2022-06-24

## 2022-06-24 RX ORDER — AMLODIPINE BESYLATE 5 MG/1
5 TABLET ORAL DAILY
Qty: 90 TABLET | Refills: 1 | Status: SHIPPED | OUTPATIENT
Start: 2022-06-24

## 2022-06-24 RX ORDER — METOPROLOL TARTRATE 50 MG/1
50 TABLET, FILM COATED ORAL 2 TIMES DAILY
Qty: 180 TABLET | Refills: 1 | Status: SHIPPED | OUTPATIENT
Start: 2022-06-24

## 2022-09-08 ENCOUNTER — TELEPHONE (OUTPATIENT)
Dept: INTERNAL MEDICINE CLINIC | Facility: CLINIC | Age: 72
End: 2022-09-08

## 2022-09-08 NOTE — TELEPHONE ENCOUNTER
Spoke to patient (name and  of patient verified). He explains that he is taking his medicine as prescribed and is interested in adding a vitamin to see if it helps balance him out so he can sleep through the night. He explained that he goes to bed around 11pm and sleeps until 10:30am. He reports he used to work for 20 hours a day, but he recently stopped working at the end of last year. Patient reports he does not sleep through the night and he gets up to walk around every few hours and it takes him some time to fall back asleep. Dr. Abeba Paulino, please advise which vitamins or supplements patient should take daily that may help improve his sleep. Thank you.

## 2022-09-09 ENCOUNTER — TELEPHONE (OUTPATIENT)
Dept: INTERNAL MEDICINE CLINIC | Facility: CLINIC | Age: 72
End: 2022-09-09

## 2022-09-09 DIAGNOSIS — R53.81 PHYSICAL DECONDITIONING: Primary | ICD-10-CM

## 2022-09-09 NOTE — TELEPHONE ENCOUNTER
Patient is requesting an order for physical therapy to help him walk. He was given a referral but wasn't able to attend due to cataract surgery. He has an appointment scheduled for the end of September to Samina Robles in [facility]     4/14/22: Lymphedema of both lower extremities  Plan  Chronic, well controlled on current medications, denies adverse effects, continue with compression stockings, would benefit from PT to improve endurance and muscle mass.        Referral has been pended please approve if appropriate

## 2022-09-18 DIAGNOSIS — J45.41 MODERATE PERSISTENT ASTHMA WITH ACUTE EXACERBATION: ICD-10-CM

## 2022-09-19 RX ORDER — ALBUTEROL SULFATE 90 UG/1
2 AEROSOL, METERED RESPIRATORY (INHALATION) EVERY 6 HOURS PRN
Qty: 3 EACH | Refills: 1 | Status: SHIPPED | OUTPATIENT
Start: 2022-09-19

## 2022-09-19 NOTE — TELEPHONE ENCOUNTER
Refill passed per [address] [facility] protocol.   Requested Prescriptions   Pending Prescriptions Disp Refills    ALBUTEROL 108 (90 Base) MCG/ACT Inhalation Aero Soln [Pharmacy Med Name: ALBUTEROL HFA INH (200 PUFFS)8.5GM] 25.5 g 0     Sig: INHALE 2 PUFFS INTO THE LUNGS EVERY 6 HOURS AS NEEDED FOR WHEEZING OR SHORTNESS OF BREATH        Asthma & COPD Medication Protocol Passed - 9/18/2022  3:46 AM        Passed - In person appointment or virtual visit in the past 6 mos or appointment in next 3 mos       Recent Outpatient Visits              3 months ago Preop general physical exam    [address], Christiano Wood MD    Office Visit    5 months ago Mixed hyperlipidemia    [address], Promise Concepcion MD    Office Visit    8 months ago Pain in toes of both feet    [facility], [address], [address] [place] [address]    Office Visit    9 months ago Lymphedema of both lower extremities    Vascular - Jane Cruz MD    Office Visit    1 year ago Moderate persistent asthma with acute exacerbation    Promise Hernandez MD    Virtual Phone E/M                       Recent Outpatient Visits              3 months ago Preop general physical exam    Christiano Hernandez MD    Office Visit    5 months ago Mixed hyperlipidemia    [address], Promise Concepcion MD    Office Visit    8 months ago Pain in toes of both feet    [facility], [address], [place], [place]    Office Visit    9 months ago Lymphedema of both lower extremities    Donna - Jane Cruz MD    Office Visit    1 year ago Moderate persistent asthma with acute exacerbation    Promise Hernandez MD    Virtual Phone E/M

## 2022-09-22 RX ORDER — ATORVASTATIN CALCIUM 20 MG/1
20 TABLET, FILM COATED ORAL NIGHTLY
Qty: 90 TABLET | Refills: 1 | Status: SHIPPED | OUTPATIENT
Start: 2022-09-22

## 2022-09-22 NOTE — TELEPHONE ENCOUNTER
[id number] - [place], [place] - [address] AT \[facility]", [phone], [phone]     Associated Reports   View Encounter   Priority and Order Details      Disp Refills Start End    famotidine 20 MG Oral Tab 180 tablet 1 6/24/2022     Sig - Route:  Take 1 tablet (20 mg total) by mouth 2 (two) times daily. - Oral    Sent to pharmacy as: Famotidine 20 MG Oral Tablet (Pepcid)    E-Prescribing Status: Receipt confirmed by pharmacy (6/24/2022 10:52 AM CDT)

## 2022-10-04 ENCOUNTER — TELEPHONE (OUTPATIENT)
Dept: INTERNAL MEDICINE CLINIC | Facility: CLINIC | Age: 72
End: 2022-10-04

## 2022-10-04 NOTE — TELEPHONE ENCOUNTER
Patient states he will be seeing a [address] [place] [address] Dr certified physician patient is trying to fly again, states he needs his medication list, labs and test results faxed over to physicians office including information or tests regarding his mini stroke.     Please call to Dr. Nahomi Frey # [phone]

## 2022-10-05 NOTE — TELEPHONE ENCOUNTER
Ok to give his medlist, recent labs as per request His stroke was back in Sept 2018;   They may actually need a copy of his chart for them to review

## 2022-10-31 ENCOUNTER — TELEPHONE (OUTPATIENT)
Dept: INTERNAL MEDICINE CLINIC | Facility: CLINIC | Age: 72
End: 2022-10-31

## 2022-10-31 NOTE — TELEPHONE ENCOUNTER
[employer] Initial Eval DOS 10/27/2022 placed on Dr. Foster Easley desk for signature and review.

## 2022-11-03 ENCOUNTER — MED REC SCAN ONLY (OUTPATIENT)
Dept: INTERNAL MEDICINE CLINIC | Facility: CLINIC | Age: 72
End: 2022-11-03

## 2022-11-15 ENCOUNTER — TELEPHONE (OUTPATIENT)
Dept: HEMATOLOGY/ONCOLOGY | Facility: HOSPITAL | Age: 72
End: 2022-11-15

## 2022-11-15 NOTE — TELEPHONE ENCOUNTER
LVM to call back and schedule follow up with Dr. Nathan Waddell due to needing refill on Elquis, per Nahun Espinosa.  Called 11-15-22

## 2022-11-16 ENCOUNTER — APPOINTMENT (OUTPATIENT)
Dept: HEMATOLOGY/ONCOLOGY | Facility: HOSPITAL | Age: 72
End: 2022-11-16
Attending: INTERNAL MEDICINE
Payer: MEDICARE

## 2022-11-18 ENCOUNTER — TELEPHONE (OUTPATIENT)
Dept: HEMATOLOGY/ONCOLOGY | Facility: HOSPITAL | Age: 72
End: 2022-11-18

## 2022-11-18 NOTE — TELEPHONE ENCOUNTER
**LVM to schedule a missed F/U with Dr. Faheem Zelaya, [facility] 11/18/22**

## 2022-11-21 RX ORDER — FAMOTIDINE 20 MG/1
20 TABLET, FILM COATED ORAL 2 TIMES DAILY
Qty: 180 TABLET | Refills: 1 | Status: SHIPPED | OUTPATIENT
Start: 2022-11-21

## 2022-11-21 NOTE — TELEPHONE ENCOUNTER
Refill passed per [facility] protocol. Requested Prescriptions   Pending Prescriptions Disp Refills    famotidine 20 MG Oral Tab 180 tablet 1     Sig: Take 1 tablet (20 mg total) by mouth 2 (two) times daily.        Gastrointestional Medication Protocol Passed - 11/21/2022  1:55 PM        Passed - In person appointment or virtual visit in the past 12 mos or appointment in next 3 mos     Recent Outpatient Visits              6 months ago Preop general physical exam    [facility], Alex Malik MD    Office Visit    7 months ago Mixed hyperlipidemia    [facility], [address] [place], Candice Cole MD    Office Visit    10 months ago Pain in toes of both feet    [facility], [address], [address] [place] [address]    Office Visit    11 months ago Lymphedema of both lower extremities    Thad Mary MD    Office Visit    1 year ago Moderate persistent asthma with acute exacerbation    [address] Cand[address] Abreu MD    Virtual Phone E/M                                Recent Outpatient Visits              6 months ago Preop general physical exam    [facility], [address] [place], Alex Resendez MD    Office Visit    7 months ago Mixed hyperlipidemia    [facility], [address] [place], Candice Cole MD    Office Visit    10 months ago Pain in toes of both feet    [facility], [address], [address] [place] [address]    Office Visit    11 months ago Lymphedema of both lower extremities    Thad Mary MD    Office Visit    1 year ago Moderate persistent asthma with acute exacerbation    [address] Abreu MD    Virtual Phone E/M

## 2022-11-23 ENCOUNTER — TELEPHONE (OUTPATIENT)
Dept: INTERNAL MEDICINE CLINIC | Facility: CLINIC | Age: 72
End: 2022-11-23

## 2022-11-23 NOTE — TELEPHONE ENCOUNTER
Medicare progress notes dated 11/18/2022 received from Samina Robles, placed on Dr Kimberlyn crowell for review and signature.

## 2022-11-25 NOTE — TELEPHONE ENCOUNTER
Medicare progress notes dated 11/18/2022 from [facility] [place] faxed to [phone] confirmation received.

## 2022-12-13 ENCOUNTER — APPOINTMENT (OUTPATIENT)
Dept: HEMATOLOGY/ONCOLOGY | Facility: HOSPITAL | Age: 72
End: 2022-12-13
Attending: INTERNAL MEDICINE
Payer: MEDICARE

## 2022-12-20 ENCOUNTER — OFFICE VISIT (OUTPATIENT)
Dept: HEMATOLOGY/ONCOLOGY | Facility: HOSPITAL | Age: 72
End: 2022-12-20
Attending: INTERNAL MEDICINE
Payer: MEDICARE

## 2022-12-20 VITALS
HEIGHT: 65 IN | SYSTOLIC BLOOD PRESSURE: 152 MMHG | WEIGHT: 217 LBS | BODY MASS INDEX: 36.15 KG/M2 | RESPIRATION RATE: 18 BRPM | OXYGEN SATURATION: 92 % | TEMPERATURE: 99 F | DIASTOLIC BLOOD PRESSURE: 81 MMHG | HEART RATE: 76 BPM

## 2022-12-20 DIAGNOSIS — Z79.01 ANTICOAGULATION MANAGEMENT ENCOUNTER: ICD-10-CM

## 2022-12-20 DIAGNOSIS — I82.401 DEEP VEIN THROMBOSIS (DVT) OF RIGHT LOWER EXTREMITY, UNSPECIFIED CHRONICITY, UNSPECIFIED VEIN (HCC): Primary | ICD-10-CM

## 2022-12-20 DIAGNOSIS — Z51.81 ANTICOAGULATION MANAGEMENT ENCOUNTER: ICD-10-CM

## 2022-12-20 PROCEDURE — 99214 OFFICE O/P EST MOD 30 MIN: CPT | Performed by: INTERNAL MEDICINE

## 2022-12-23 RX ORDER — AMLODIPINE BESYLATE 5 MG/1
5 TABLET ORAL DAILY
Qty: 90 TABLET | Refills: 0 | Status: SHIPPED | OUTPATIENT
Start: 2022-12-23

## 2022-12-23 NOTE — TELEPHONE ENCOUNTER
Please review. Protocol Failed or has No Protocol. Requested Prescriptions   Pending Prescriptions Disp Refills    amLODIPine 5 MG Oral Tab 90 tablet 1     Sig: Take 1 tablet (5 mg total) by mouth daily. Hypertensive Medications Protocol Failed - 12/23/2022  9:13 AM        Failed - Last BP reading less than 140/90     BP Readings from Last 1 Encounters:  12/20/22 : 152/81              Failed - CMP or BMP in past 6 months     No results found for this or any previous visit (from the past 4392 hour(s)).             Failed - In person appointment or virtual visit in the past 6 months     Recent Outpatient Visits              3 days ago Deep vein thrombosis (DVT) of right lower extremity, unspecified chronicity, unspecified vein [facility])    [facility] Hematology Oncology Reida Bence, MD    Office Visit    7 months ago Preop general physical exam    [facility], [facility], [address], Ana Armstrong MD    Office Visit    8 months ago Mixed hyperlipidemia    [facility], [facility], [address], Dorrie Hodgkin, MD    Office Visit    11 months ago Pain in toes of both feet    [facility], [address], [address] [place] [address]    Office Visit    1 year ago Lymphedema of both lower extremities    [facility] - Eliza Hameed, Keanu Snell MD    Office Visit          Future Appointments         Provider Department Appt Notes    In 11 months Nafisa Patricio, [address] Hematology Oncology annual f/u caf               Passed - In person appointment in the past 12 or next 3 months     Recent Outpatient Visits              3 days ago Deep vein thrombosis (DVT) of right lower extremity, unspecified chronicity, unspecified vein [facility])    [facility] Hematology Oncology Reida Bence, MD    Office Visit    7 months ago Preop general physical exam    Panda Dodge MD    Office Visit    8 months ago Mixed hyperlipidemia    [facility], [address], Rodney Gonsalez MD    Office Visit    11 months ago Pain in toes of both [facility], [address], [place], [place]    Office Visit    1 year ago Lymphedema of both lower extremities    Vascular - [address] Mal[address] Alonzo MD    Office Visit          Future Appointments         Provider Department Appt Notes    In 11 months Mary Patricio [address] Hematology Oncology annual f/u caf               Passed - [facility] or [facility] > 50     GFR Evaluation  GFRNAA: 58 , resulted on 4/14/2022               Future Appointments         Provider Department Appt Notes    In 11 months Mary Patricio [address] Hematology Oncology annual f/u caf            Recent Outpatient Visits              3 days ago Deep vein thrombosis (DVT) of right lower extremity, unspecified chronicity, unspecified vein [facility] Hemat[facility] Oncology Leonard Cade MD    Office Visit    7 months ago Preop general physical exam    [facility], [employer], [address], Moses Camargo MD    Office Visit    8 months ago Mixed hyperlipidemia    [facility], [employer], [address] [place], Rodney Gonsalez MD    Office Visit    11 months ago Pain in toes of both feet    [facility], [address], [address] [place] [address]    Office Visit    1 year ago Lymphedema of both lower extremities    Vascular - Malathi Myers MD    Office Visit

## 2022-12-25 RX ORDER — METOPROLOL TARTRATE 50 MG/1
50 TABLET, FILM COATED ORAL 2 TIMES DAILY
Qty: 180 TABLET | Refills: 1 | Status: SHIPPED | OUTPATIENT
Start: 2022-12-25

## 2022-12-27 ENCOUNTER — TELEPHONE (OUTPATIENT)
Dept: INTERNAL MEDICINE CLINIC | Facility: CLINIC | Age: 72
End: 2022-12-27

## 2022-12-27 ENCOUNTER — MED REC SCAN ONLY (OUTPATIENT)
Dept: INTERNAL MEDICINE CLINIC | Facility: CLINIC | Age: 72
End: 2022-12-27

## 2022-12-27 NOTE — TELEPHONE ENCOUNTER
Medicare progress dated 12/23/2022 received from Samina Robles, placed on Dr Geovanna crowell for review and signature.

## 2022-12-29 NOTE — TELEPHONE ENCOUNTER
Medicare progress notes dated 12/23/2022 signed and faxed to Samina Robles at [phone], confirmation received.

## 2023-02-09 ENCOUNTER — NURSE TRIAGE (OUTPATIENT)
Dept: INTERNAL MEDICINE CLINIC | Facility: CLINIC | Age: 73
End: 2023-02-09

## 2023-02-16 ENCOUNTER — OFFICE VISIT (OUTPATIENT)
Dept: INTERNAL MEDICINE CLINIC | Facility: CLINIC | Age: 73
End: 2023-02-16

## 2023-02-16 VITALS — HEIGHT: 65 IN | WEIGHT: 217 LBS | BODY MASS INDEX: 36.15 KG/M2

## 2023-02-16 DIAGNOSIS — R21 RASH: ICD-10-CM

## 2023-02-16 DIAGNOSIS — J45.41 MODERATE PERSISTENT ASTHMA WITH ACUTE EXACERBATION: Primary | ICD-10-CM

## 2023-02-16 PROCEDURE — 99214 OFFICE O/P EST MOD 30 MIN: CPT | Performed by: INTERNAL MEDICINE

## 2023-02-16 RX ORDER — PREDNISONE 20 MG/1
20 TABLET ORAL 2 TIMES DAILY WITH MEALS
Qty: 14 TABLET | Refills: 0 | Status: SHIPPED | OUTPATIENT
Start: 2023-02-16 | End: 2023-02-23

## 2023-02-16 RX ORDER — ALBUTEROL SULFATE 90 UG/1
AEROSOL, METERED RESPIRATORY (INHALATION)
Qty: 3 EACH | Refills: 9 | Status: SHIPPED | OUTPATIENT
Start: 2023-02-16

## 2023-02-16 RX ORDER — ALBUTEROL SULFATE 2.5 MG/3ML
2.5 SOLUTION RESPIRATORY (INHALATION) EVERY 6 HOURS PRN
Qty: 30 EACH | Refills: 1 | Status: SHIPPED | OUTPATIENT
Start: 2023-02-16

## 2023-02-24 ENCOUNTER — TELEPHONE (OUTPATIENT)
Dept: FAMILY MEDICINE CLINIC | Facility: CLINIC | Age: 73
End: 2023-02-24

## 2023-02-24 NOTE — TELEPHONE ENCOUNTER
Patient called and states he completed course of prednisone and is feeling better. He wanted to know if he should get a refill. He was made aware Prednisone is not typically given as a maintenance medication. I asked if he is taking fluticasone-salmeterol 115-21 MCG/ACT Inhalation Aerosol. He handed phone over to Vanesa/friend --> she reports patient has not taken, he only has taken Albuterol HFA. I provided education related to the differences of the 2 inhalers [rinsing mouth post inhalation for ICS], he has not picked up fluticasone-salmeterol inhaler from the pharmacy. He would also benefit from a spacer, she was made aware Rx request for a spacer will be sent to PCP. She states she will explain to patient and verbalized understanding.     Rx Pended, authorize if appropriate

## 2023-03-08 ENCOUNTER — TELEPHONE (OUTPATIENT)
Dept: INTERNAL MEDICINE CLINIC | Facility: CLINIC | Age: 73
End: 2023-03-08

## 2023-03-08 NOTE — TELEPHONE ENCOUNTER
Verified name and . Patient was seen during office visit on 23 during which Prednisone was prescription for moderate asthma exacerbation. Patient is asking if another round of Prednisone is appropriate. He states that his symptoms have improved since last office visit but not 100%. Patient coughing during phone call but states that the amount of coughing is actually better than baseline. He denies any difficulty breathing at this time. Patient states he has been taking Mucinex and states that he feels this is helping. Appointment scheduled:  Future Appointments   Date Time Provider Guido Celaya   3/13/2023  3:00 PM Jaimee Marie MD [facility] AD   2023  1:00 PM [place] Oma Patricio  [facility] ONC EMO       He was advised to call back with any new or worsening symptoms. Patient verbalizes understanding and agrees with plan.

## 2023-03-14 ENCOUNTER — OFFICE VISIT (OUTPATIENT)
Dept: INTERNAL MEDICINE CLINIC | Facility: CLINIC | Age: 73
End: 2023-03-14

## 2023-03-14 VITALS
HEART RATE: 83 BPM | WEIGHT: 211 LBS | BODY MASS INDEX: 35.16 KG/M2 | HEIGHT: 65 IN | SYSTOLIC BLOOD PRESSURE: 130 MMHG | DIASTOLIC BLOOD PRESSURE: 72 MMHG | OXYGEN SATURATION: 91 %

## 2023-03-14 DIAGNOSIS — J44.1 CHRONIC OBSTRUCTIVE PULMONARY DISEASE WITH ACUTE EXACERBATION (HCC): Primary | ICD-10-CM

## 2023-03-14 PROCEDURE — 99214 OFFICE O/P EST MOD 30 MIN: CPT | Performed by: NURSE PRACTITIONER

## 2023-03-14 RX ORDER — AZITHROMYCIN 250 MG/1
TABLET, FILM COATED ORAL
Qty: 6 TABLET | Refills: 0 | Status: SHIPPED | OUTPATIENT
Start: 2023-03-14 | End: 2023-03-19

## 2023-03-14 RX ORDER — METHYLPREDNISOLONE 4 MG/1
TABLET ORAL
Qty: 21 EACH | Refills: 0 | Status: SHIPPED | OUTPATIENT
Start: 2023-03-14

## 2023-03-17 ENCOUNTER — TELEPHONE (OUTPATIENT)
Dept: INTERNAL MEDICINE CLINIC | Facility: CLINIC | Age: 73
End: 2023-03-17

## 2023-03-17 NOTE — TELEPHONE ENCOUNTER
Patient called. He is asking if he needs more medication since he is not 100% better. Feeling better and but not completely better. No longer \"hacking\". Not coughing on the phone. He did not have sob on the phone. Sounds clear and strong voice. Was seen 3/14/23; treated with zpak, medrol nurys. Advised patient to allow the medication few more days to help; finish both medications. Can call back Monday if he feels symptoms are not improving or feels worse. Patient verbalized understanding.

## 2023-03-29 RX ORDER — ATORVASTATIN CALCIUM 20 MG/1
20 TABLET, FILM COATED ORAL NIGHTLY
Qty: 90 TABLET | Refills: 3 | Status: SHIPPED | OUTPATIENT
Start: 2023-03-29

## 2023-03-30 RX ORDER — AMLODIPINE BESYLATE 5 MG/1
5 TABLET ORAL DAILY
Qty: 90 TABLET | Refills: 1 | Status: SHIPPED | OUTPATIENT
Start: 2023-03-30

## 2023-03-30 NOTE — TELEPHONE ENCOUNTER
Future Appointments   Date Time Provider Guido Celaya   12/7/2023  1:00 PM [place] Patricio MD [facility] HEM ONC EMO         Please review; protocol failed/no protocol. Requested Prescriptions   Pending Prescriptions Disp Refills    amLODIPine 5 MG Oral Tab 90 tablet 3     Sig: Take 1 tablet (5 mg total) by mouth daily. Hypertensive Medications Protocol Failed - 3/29/2023  1:07 PM        Failed - CMP or BMP in past 6 months     No results found for this or any previous visit (from the past 4392 hour(s)).             Passed - In person appointment in the past 12 or next 3 months     Recent Outpatient Visits              2 weeks ago Chronic obstructive pulmonary disease with acute exacerbation ([facility] [address]    [address] [place], [place] [address]Ezequiel APRN    Office Visit    1 month ago Moderate persistent asthma with acute exacerbation    [address] [place], [place] [address]Yovanny MD    Office Visit    3 months ago Deep vein thrombosis (DVT) of right lower extremity, unspecified chronicity, unspecified vein [facility] Hematology Oncology Alberto Lynn MD    Office Visit    10 months ago Preop general physical exam    [address] [place], [place] [address]Darleen MD    Office Visit    11 months ago Mixed hyperlipidemia    [address] [place], [place] [address]Yovanny MD    Office Visit          Future Appointments         Provider Department Appt Notes    In 8 months [place] Patricio, [address] Hematology Oncology annual f/u caf               Passed - Last BP reading less than 140/90     BP Readings from Last 1 Encounters:  03/14/23 : 130/72              Passed - In person appointment or virtual visit in the past 6 months     Recent Outpatient Visits              2 weeks ago Chronic obstructive pulmonary disease with acute exacerbation ([facility] [address]    Ezequiel Forrest LIAM Ortega    Office Visit    1 month ago Moderate persistent asthma with acute exacerbation    Gunjan Brady MD    Office Visit    3 months ago Deep vein thrombosis (DVT) of right lower extremity, unspecified chronicity, unspecified vein [facility])    [facility] Hematology Oncology Carlos Lugo MD    Office Visit    10 months ago Preop general physical exam    Rony Brady MD    Office Visit    11 months ago Mixed hyperlipidemia    Gunjan Brady MD    Office Visit          Future Appointments         Provider Department Appt Notes    In 8 months Hakeem Patricio, [address] Hematology Oncology annual f/u caf               Passed [facility] or [facility] > 50     GFR Evaluation  GFRNAA: 58 , resulted on 4/14/2022           Signed Prescriptions Disp Refills    atorvastatin 20 MG Oral Tab 90 tablet 3     Sig: Take 1 tablet (20 mg total) by mouth nightly.        Cholesterol Medication Protocol Passed - 3/29/2023  1:07 PM        Passed - ALT in past 12 months        Passed - LDL in past 12 months        Passed - Last ALT < 80     Lab Results   Component Value Date    ALT 19 04/14/2022             Passed - Last LDL < 130     Lab Results   Component Value Date    LDL 83 04/14/2022             Passed - In person appointment or virtual visit in the past 12 mos or appointment in next 3 mos     Recent Outpatient Visits              2 weeks ago Chronic obstructive pulmonary disease with acute exacerbation ([address]    Ezequiel Brady APRN    Office Visit    1 month ago Moderate persistent asthma with acute exacerbation    Gunjan Brady MD    Office Visit    3 months ago Deep vein thrombosis (DVT) of right lower extremity, unspecified chronicity, unspecified vein [facility])    [facility] Hematology Oncology Jalene Closs, MD    Office Visit    10 months ago Preop general physical exam    [address], [address], Braden Muller MD    Office Visit    11 months ago Mixed hyperlipidemia    [address], [address], Michelle Short MD    Office Visit          Future Appointments         Provider Department Appt Notes    In 8 months Erica Patricio, [address] Hematology Oncology annual f/u caf                     Recent Outpatient Visits              2 weeks ago Chronic obstructive pulmonary disease with acute exacerbation [facility])    [address], [address], LIAM Otoole    Office Visit    1 month ago Moderate persistent asthma with acute exacerbation    [facility], [address], Michelle Short MD    Office Visit    3 months ago Deep vein thrombosis (DVT) of right lower extremity, unspecified chronicity, unspecified vein [facility])    [facility] Hematology Oncology Jalene Closs, MD    Office Visit    10 months ago Preop general physical exam    [address], [address], Braden Muller MD    Office Visit    11 months ago Mixed hyperlipidemia    [address] [facility], Michelle Short MD    Office Visit             Future Appointments         Provider Department Appt Notes    In 8 months Jalene Closs, [address] Hematology Oncology annual f/u caf

## 2023-03-30 NOTE — TELEPHONE ENCOUNTER
Refill passed per [facility] protocol. Requested Prescriptions   Pending Prescriptions Disp Refills    amLODIPine 5 MG Oral Tab 90 tablet 3     Sig: Take 1 tablet (5 mg total) by mouth daily. Hypertensive Medications Protocol Failed - 3/29/2023  1:07 PM        Failed - CMP or BMP in past 6 months     No results found for this or any previous visit (from the past 4392 hour(s)).             Passed - In person appointment in the past 12 or next 3 months     Recent Outpatient Visits              2 weeks ago Chronic obstructive pulmonary disease with acute exacerbation ([address]    [facility], [address], LIAM Marrero    Office Visit    1 month ago Moderate persistent asthma with acute exacerbation    [facility], [address], Marichuy Rosario MD    Office Visit    3 months ago Deep vein thrombosis (DVT) of right lower extremity, unspecified chronicity, unspecified vein [facility])    [facility] Hematology Oncology Sena Dai MD    Office Visit    10 months ago Preop general physical exam    Lloyd Baeza MD    Office Visit    11 months ago Mixed hyperlipidemia    [facility], [address] [place], Marichuy Rosario MD    Office Visit          Future Appointments         Provider Department Appt Notes    In 8 months Niko Patricio, [address] Hematology Oncology annual f/u caf               Passed - Last BP reading less than 140/90     BP Readings from Last 1 Encounters:  03/14/23 : 130/72              Passed - In person appointment or virtual visit in the past 6 months     Recent Outpatient Visits              2 weeks ago Chronic obstructive pulmonary disease with acute exacerbation [facility])    Yanira Palafox, LIAM Marrero    Office Visit    1 month ago Moderate persistent asthma with acute exacerbation    [facility]    Office Visit    3 months ago Deep vein thrombosis (DVT) of right lower extremity, unspecified chronicity, unspecified vein [facility])    [facility] Hematology Oncology Tracy Monge MD    Office Visit    10 months ago Preop general physical exam    Ray Lind MD    Office Visit    11 months ago Mixed hyperlipidemia    Ernestina Lind MD    Office Visit          Future Appointments         Provider Department Appt Notes    In 8 months Amy Patricio [address] Hematology Oncology annual f/u caf               Passed [facility] or [facility] > 50     GFR Evaluation  GFRNAA: 58 , resulted on 4/14/2022            atorvastatin 20 MG Oral Tab 90 tablet 3     Sig: Take 1 tablet (20 mg total) by mouth nightly.        Cholesterol Medication Protocol Passed - 3/29/2023  1:07 PM        Passed - ALT in past 12 months        Passed - LDL in past 12 months        Passed - Last ALT < 80     Lab Results   Component Value Date    ALT 19 04/14/2022             Passed - Last LDL < 130     Lab Results   Component Value Date    LDL 83 04/14/2022             Passed - In person appointment or virtual visit in the past 12 mos or appointment in next 3 mos     Recent Outpatient Visits              2 weeks ago Chronic obstructive pulmonary disease with acute exacerbation ([address]    Ezequiel Lind APRN    Office Visit    1 month ago Moderate persistent asthma with acute exacerbation    Ernestina Lind MD    Office Visit    3 months ago Deep vein thrombosis (DVT) of right lower extremity, unspecified chronicity, unspecified vein [facility])    [facility] Hematology Oncology Tracy Monge MD    Office Visit    10 months ago Preop general physical exam    [facility] [address], Ezequiel Baez MD    Office Visit    11 months ago Mixed hyperlipidemia    [address] [facility], Michelle Short MD    Office Visit          Future Appointments         Provider Department Appt Notes    In 8 months Erica Patricio, [address] Hematology Oncology annual f/u caf                     Future Appointments         Provider Department Appt Notes    In 8 months Erica Patricio, [address] Hematology Oncology annual f/u caf             Recent Outpatient Visits              2 weeks ago Chronic obstructive pulmonary disease with acute exacerbation [facility])    [address], [facility], LIAM Otoole    Office Visit    1 month ago Moderate persistent asthma with acute exacerbation    [address] [facility], Michelle Short MD    Office Visit    3 months ago Deep vein thrombosis (DVT) of right lower extremity, unspecified chronicity, unspecified vein [facility])    [facility] Hematology Oncology Jalene Closs, MD    Office Visit    10 months ago Preop general physical exam    [address] [facility], Braden Muller MD    Office Visit    11 months ago Mixed hyperlipidemia    [address] [facility], Michelle Short MD    Office Visit

## 2023-04-04 ENCOUNTER — NURSE TRIAGE (OUTPATIENT)
Dept: INTERNAL MEDICINE CLINIC | Facility: CLINIC | Age: 73
End: 2023-04-04

## 2023-04-04 NOTE — TELEPHONE ENCOUNTER
He needs go to at least to [address] [place] [address] today; he may have cellulitis; he also had history of DVT before so will need to consider this too.

## 2023-04-04 NOTE — TELEPHONE ENCOUNTER
CSS transfer the call as pt is having trouble with his legs and wanted to make a appt.  Not able to hear pt.     lmtcb

## 2023-04-04 NOTE — TELEPHONE ENCOUNTER
Pt was called back and he stated that his left leg feels like heat. Then he said no not like heat like my muscles are working and when he wears the compression sock it helps and when he elevates them. Pt denied having leg pain, redness. Pt stated that he is able to walk. Informed pt he needs to be seen to know what is going on as he was having a had time describing it. Then while on the phone he was coughing and I would hear a wheeze. Then it calmed down and pt was talking ok but advised him he should also be seen for his cough and wheezing. Pt denied chest pain or sob. Pt only wants to be seen at the [facility] location and only wants to be seen by Dr. Irasema Kern or Dr. Wicho Pal. Inform pt no appt available for this week but he needs to be seen today or tomorrow. Pt only wants to see Dr. Luis Felipe Diggs and Dr. Luis Felipe Diggs next appt is April 18, 2022. Dr. Teagan Fraga please advise if you can add pt.

## 2023-04-04 NOTE — TELEPHONE ENCOUNTER
Pt was called and informed of Dr. Abeba Paulino message below and he stated that he can not go to I/C today but he will try to go tomorrow or Thursday. Informed pt that its important he go today. Pt stated that he can not go today as he has people over he will try tomorrow.   RN f/u 4/5 in the evening

## 2023-04-05 NOTE — TELEPHONE ENCOUNTER
Pt was called as there is still no I/C appt. Pt stated that he is washing today and had to do something else so he has not gone to I/C. He might go tomorrow or Friday. Dr. Larissa Ramos Pt then mentioned that he will like to make a appt when ever your  available to review his medications. Your next available appt is April 21, 23 Reserved fast pass. Ok to give him this appt?

## 2023-04-08 ENCOUNTER — APPOINTMENT (OUTPATIENT)
Dept: GENERAL RADIOLOGY | Age: 73
End: 2023-04-08
Attending: NURSE PRACTITIONER
Payer: MEDICARE

## 2023-04-08 ENCOUNTER — HOSPITAL ENCOUNTER (OUTPATIENT)
Age: 73
Discharge: HOME | End: 2023-04-08
Payer: MEDICARE

## 2023-04-08 VITALS
OXYGEN SATURATION: 95 % | HEART RATE: 81 BPM | RESPIRATION RATE: 22 BRPM | TEMPERATURE: 97 F | SYSTOLIC BLOOD PRESSURE: 131 MMHG | DIASTOLIC BLOOD PRESSURE: 72 MMHG

## 2023-04-08 DIAGNOSIS — Z20.822 ENCOUNTER FOR LABORATORY TESTING FOR COVID-19 VIRUS: Primary | ICD-10-CM

## 2023-04-08 DIAGNOSIS — J44.1 COPD EXACERBATION (HCC): ICD-10-CM

## 2023-04-08 DIAGNOSIS — R05.9 COUGH, UNSPECIFIED TYPE: ICD-10-CM

## 2023-04-08 LAB — SARS-COV-2 RNA RESP QL NAA+PROBE: NOT DETECTED

## 2023-04-08 PROCEDURE — 99213 OFFICE O/P EST LOW 20 MIN: CPT | Performed by: NURSE PRACTITIONER

## 2023-04-08 PROCEDURE — U0002 COVID-19 LAB TEST NON-CDC: HCPCS | Performed by: NURSE PRACTITIONER

## 2023-04-08 PROCEDURE — 71046 X-RAY EXAM CHEST 2 VIEWS: CPT | Performed by: NURSE PRACTITIONER

## 2023-04-08 RX ORDER — BENZONATATE 100 MG/1
100 CAPSULE ORAL 3 TIMES DAILY PRN
Qty: 30 CAPSULE | Refills: 0 | Status: SHIPPED | OUTPATIENT
Start: 2023-04-08 | End: 2023-05-08

## 2023-04-08 RX ORDER — PREDNISONE 20 MG/1
40 TABLET ORAL DAILY
Qty: 14 TABLET | Refills: 0 | Status: SHIPPED | OUTPATIENT
Start: 2023-04-08 | End: 2023-04-15

## 2023-04-08 NOTE — ED INITIAL ASSESSMENT (HPI)
Patient was seen on 3/14 and prescribed methylprednisolone and azithromycin, which symptoms improved. Pt c/o cough, requesting cough medicine. States the meds worked very well and \"it wants to come back\". Requesting 2 more inhalers because he is out.  Pt cough, short of breath during exam. History of COPD

## 2023-04-11 ENCOUNTER — NURSE TRIAGE (OUTPATIENT)
Dept: INTERNAL MEDICINE CLINIC | Facility: CLINIC | Age: 73
End: 2023-04-11

## 2023-04-11 ENCOUNTER — TELEPHONE (OUTPATIENT)
Dept: INTERNAL MEDICINE CLINIC | Facility: CLINIC | Age: 73
End: 2023-04-11

## 2023-04-11 NOTE — TELEPHONE ENCOUNTER
Called patient (name and  verified) and discussed Dr. Bryant Savage recommendations. Patient verbalized understanding. Patient will call back to make a yearly Medicare physical. Asking if Dr. Lorenzo Rodriguez also provides [address] ([employer]'s physical exams. Please advise.

## 2023-04-11 NOTE — TELEPHONE ENCOUNTER
pt was seen at the u/c and he was given Prednisone. Pt was informed that according to the script below it says to take 2 tablets by mouth daily for seven days. Pt feels its to strong and he has been taking 1 tablet in the morning and 1 tablet in the evening if this is ok ?or should he take them together. Dr. Sigifredo Lazar please advise. Pt is feeling better with the medication that was given to him at I/C. Pt was inform to call I/C per pt he did and was disconnected so he thought to call his PCP. predniSONE 20 MG Oral Tab 14 tablet 0 4/8/2023 4/15/2023    Sig - Route:  Take 2 tablets (40 mg total) by mouth daily for 7 days. - Oral    Sent to pharmacy as: predniSONE 20 MG Oral Tablet (Deltasone)    E-Prescribing Status: Receipt confirmed by pharmacy (4/8/2023 12:08 PM CDT)

## 2023-04-24 DIAGNOSIS — R21 RASH: ICD-10-CM

## 2023-04-25 NOTE — TELEPHONE ENCOUNTER
LOV:02/16/23  Last refill:not on active med list  Left message to callback- not on active med list, wondering why pt was requesting?

## 2023-05-10 ENCOUNTER — NURSE TRIAGE (OUTPATIENT)
Dept: INTERNAL MEDICINE CLINIC | Facility: CLINIC | Age: 73
End: 2023-05-10

## 2023-05-10 NOTE — TELEPHONE ENCOUNTER
Advised patient of Dr. Mary Garza note. Patient verbalized understanding and stated that he has an appointment tomorrow at 2pm in [facility] with the leg doctor, so declined both appointments. Available on Friday or Monday. Please advise.

## 2023-05-10 NOTE — TELEPHONE ENCOUNTER
Patient requesting to be seen next Tuesday 5/16 around 3 pm with Dr. Lorenzo Rodriguez if he can fit him into his schedule.

## 2023-05-10 NOTE — TELEPHONE ENCOUNTER
Advised patient of Dr. Caba Bone note. Patient verbalized understanding. Appointment made.   Future Appointments   Date Time Provider Guido Belia   5/12/2023  1:30 PM MD ROYA WrightO

## 2023-05-10 NOTE — TELEPHONE ENCOUNTER
Patient was seen in urgent care on 4/8/2023 for a cough and prescribed prednisone 20mg and benzonatate. Patient still has the cough with brown phlegm. Wheezing. Denies being short of breath. Sounds winded on the phone. No fevers. Cough and phlegm is keeping him up at night. Rarely using the albuterol inhaler. Not using the advair. Patient was not using the prednisone as directed. Patient still has 3 pills of the prednisone 20mg left and still has benzonatate left as well. Wanted to know if can take prednisone pills or if needs another medication? Advised patient that should be reevaluated in the office, but declined. Stated that would rather take the prednisone and see how he feels in a few days. Please advise.

## 2023-05-12 ENCOUNTER — OFFICE VISIT (OUTPATIENT)
Dept: INTERNAL MEDICINE CLINIC | Facility: CLINIC | Age: 73
End: 2023-05-12

## 2023-05-12 VITALS
DIASTOLIC BLOOD PRESSURE: 70 MMHG | HEART RATE: 94 BPM | OXYGEN SATURATION: 99 % | WEIGHT: 218.5 LBS | HEIGHT: 66 IN | BODY MASS INDEX: 35.12 KG/M2 | SYSTOLIC BLOOD PRESSURE: 128 MMHG | TEMPERATURE: 97 F

## 2023-05-12 DIAGNOSIS — I82.401 DEEP VEIN THROMBOSIS (DVT) OF RIGHT LOWER EXTREMITY, UNSPECIFIED CHRONICITY, UNSPECIFIED VEIN (HCC): ICD-10-CM

## 2023-05-12 DIAGNOSIS — N40.0 BENIGN PROSTATIC HYPERPLASIA WITHOUT LOWER URINARY TRACT SYMPTOMS: ICD-10-CM

## 2023-05-12 DIAGNOSIS — J45.51 SEVERE PERSISTENT ASTHMA WITH ACUTE EXACERBATION: Primary | ICD-10-CM

## 2023-05-12 DIAGNOSIS — E11.9 DIABETIC EYE EXAM (HCC): ICD-10-CM

## 2023-05-12 DIAGNOSIS — E11.9 CONTROLLED TYPE 2 DIABETES MELLITUS WITHOUT COMPLICATION, WITHOUT LONG-TERM CURRENT USE OF INSULIN (HCC): ICD-10-CM

## 2023-05-12 DIAGNOSIS — I87.2 CHRONIC VENOUS INSUFFICIENCY: ICD-10-CM

## 2023-05-12 DIAGNOSIS — I10 ESSENTIAL HYPERTENSION: ICD-10-CM

## 2023-05-12 DIAGNOSIS — Z01.00 DIABETIC EYE EXAM (HCC): ICD-10-CM

## 2023-05-12 DIAGNOSIS — E78.00 PURE HYPERCHOLESTEROLEMIA: ICD-10-CM

## 2023-05-12 LAB
CARTRIDGE LOT#: ABNORMAL NUMERIC
HEMOGLOBIN A1C: 6 % (ref 4.3–5.6)

## 2023-05-12 RX ORDER — FLUTICASONE PROPIONATE AND SALMETEROL 500; 50 UG/1; UG/1
1 POWDER RESPIRATORY (INHALATION) 2 TIMES DAILY
Qty: 1 EACH | Refills: 2 | Status: SHIPPED | OUTPATIENT
Start: 2023-05-12 | End: 2023-06-11

## 2023-05-12 RX ORDER — PREDNISONE 10 MG/1
TABLET ORAL
Qty: 20 TABLET | Refills: 0 | Status: SHIPPED | OUTPATIENT
Start: 2023-05-12

## 2023-05-12 RX ORDER — DOXYCYCLINE 100 MG/1
100 TABLET ORAL 2 TIMES DAILY
Qty: 14 TABLET | Refills: 0 | Status: SHIPPED | OUTPATIENT
Start: 2023-05-12

## 2023-05-25 ENCOUNTER — TELEPHONE (OUTPATIENT)
Dept: INTERNAL MEDICINE CLINIC | Facility: CLINIC | Age: 73
End: 2023-05-25

## 2023-06-03 NOTE — PROGRESS NOTES
HPI:    Patient ID: Chirag Lomax is a 80year old male. Hypertension   This is a chronic problem. The current episode started more than 1 year ago. The problem has been gradually worsening since onset. The problem is uncontrolled.  Pertinent negatives Normal range of motion. No thyromegaly present. Cardiovascular: Normal rate, regular rhythm and normal heart sounds. Exam reveals no gallop. No murmur heard. Pulmonary/Chest: Effort normal and breath sounds normal. No respiratory distress.  He has no IV discontinued, cath removed intact

## 2023-08-17 ENCOUNTER — LAB ENCOUNTER (OUTPATIENT)
Dept: LAB | Age: 73
End: 2023-08-17
Attending: INTERNAL MEDICINE
Payer: MEDICARE

## 2023-08-17 DIAGNOSIS — E78.00 PURE HYPERCHOLESTEROLEMIA: ICD-10-CM

## 2023-08-17 DIAGNOSIS — I10 ESSENTIAL HYPERTENSION: ICD-10-CM

## 2023-08-17 DIAGNOSIS — E11.9 CONTROLLED TYPE 2 DIABETES MELLITUS WITHOUT COMPLICATION, WITHOUT LONG-TERM CURRENT USE OF INSULIN (HCC): ICD-10-CM

## 2023-08-17 DIAGNOSIS — N40.0 BENIGN PROSTATIC HYPERPLASIA WITHOUT LOWER URINARY TRACT SYMPTOMS: ICD-10-CM

## 2023-08-17 LAB
BASOPHILS # BLD AUTO: 0.03 X10(3) UL (ref 0–0.2)
BASOPHILS NFR BLD AUTO: 0.5 %
EOSINOPHIL # BLD AUTO: 0.58 X10(3) UL (ref 0–0.7)
EOSINOPHIL NFR BLD AUTO: 9.5 %
ERYTHROCYTE [DISTWIDTH] IN BLOOD BY AUTOMATED COUNT: 13.9 %
HCT VFR BLD AUTO: 39.1 %
HGB BLD-MCNC: 12.3 G/DL
IMM GRANULOCYTES # BLD AUTO: 0.02 X10(3) UL (ref 0–1)
IMM GRANULOCYTES NFR BLD: 0.3 %
LYMPHOCYTES # BLD AUTO: 1.04 X10(3) UL (ref 1–4)
LYMPHOCYTES NFR BLD AUTO: 17 %
MCH RBC QN AUTO: 28.8 PG (ref 26–34)
MCHC RBC AUTO-ENTMCNC: 31.5 G/DL (ref 31–37)
MCV RBC AUTO: 91.6 FL
MONOCYTES # BLD AUTO: 0.55 X10(3) UL (ref 0.1–1)
MONOCYTES NFR BLD AUTO: 9 %
NEUTROPHILS # BLD AUTO: 3.9 X10 (3) UL (ref 1.5–7.7)
NEUTROPHILS # BLD AUTO: 3.9 X10(3) UL (ref 1.5–7.7)
NEUTROPHILS NFR BLD AUTO: 63.7 %
PLATELET # BLD AUTO: 208 10(3)UL (ref 150–450)
RBC # BLD AUTO: 4.27 X10(6)UL
WBC # BLD AUTO: 6.1 X10(3) UL (ref 4–11)

## 2023-08-17 PROCEDURE — 82570 ASSAY OF URINE CREATININE: CPT

## 2023-08-17 PROCEDURE — 80053 COMPREHEN METABOLIC PANEL: CPT

## 2023-08-17 PROCEDURE — 82043 UR ALBUMIN QUANTITATIVE: CPT

## 2023-08-17 PROCEDURE — 84153 ASSAY OF PSA TOTAL: CPT

## 2023-08-17 PROCEDURE — 85025 COMPLETE CBC W/AUTO DIFF WBC: CPT

## 2023-08-17 PROCEDURE — 80061 LIPID PANEL: CPT

## 2023-08-17 PROCEDURE — 36415 COLL VENOUS BLD VENIPUNCTURE: CPT

## 2023-08-18 LAB
ALBUMIN SERPL-MCNC: 3 G/DL (ref 3.4–5)
ALBUMIN/GLOB SERPL: 0.6 {RATIO} (ref 1–2)
ALP LIVER SERPL-CCNC: 95 U/L
ALT SERPL-CCNC: 15 U/L
ANION GAP SERPL CALC-SCNC: 4 MMOL/L (ref 0–18)
AST SERPL-CCNC: 13 U/L (ref 15–37)
BILIRUB SERPL-MCNC: 0.7 MG/DL (ref 0.1–2)
BUN BLD-MCNC: 19 MG/DL (ref 7–18)
CALCIUM BLD-MCNC: 8.4 MG/DL (ref 8.5–10.1)
CHLORIDE SERPL-SCNC: 103 MMOL/L (ref 98–112)
CHOLEST SERPL-MCNC: 126 MG/DL (ref ?–200)
CO2 SERPL-SCNC: 28 MMOL/L (ref 21–32)
CREAT BLD-MCNC: 1.64 MG/DL
CREAT UR-SCNC: 43.7 MG/DL
EGFRCR SERPLBLD CKD-EPI 2021: 44 ML/MIN/1.73M2 (ref 60–?)
FASTING PATIENT LIPID ANSWER: NO
FASTING STATUS PATIENT QL REPORTED: NO
GLOBULIN PLAS-MCNC: 4.9 G/DL (ref 2.8–4.4)
GLUCOSE BLD-MCNC: 98 MG/DL (ref 70–99)
HDLC SERPL-MCNC: 45 MG/DL (ref 40–59)
LDLC SERPL CALC-MCNC: 64 MG/DL (ref ?–100)
MICROALBUMIN UR-MCNC: 1.96 MG/DL
MICROALBUMIN/CREAT 24H UR-RTO: 44.9 UG/MG (ref ?–30)
NONHDLC SERPL-MCNC: 81 MG/DL (ref ?–130)
OSMOLALITY SERPL CALC.SUM OF ELEC: 282 MOSM/KG (ref 275–295)
POTASSIUM SERPL-SCNC: 4.4 MMOL/L (ref 3.5–5.1)
PROT SERPL-MCNC: 7.9 G/DL (ref 6.4–8.2)
PSA SERPL-MCNC: 0.3 NG/ML (ref ?–4)
SODIUM SERPL-SCNC: 135 MMOL/L (ref 136–145)
TRIGL SERPL-MCNC: 87 MG/DL (ref 30–149)
VLDLC SERPL CALC-MCNC: 13 MG/DL (ref 0–30)

## 2023-08-20 ENCOUNTER — TELEPHONE (OUTPATIENT)
Dept: INTERNAL MEDICINE CLINIC | Facility: CLINIC | Age: 73
End: 2023-08-20

## 2023-08-20 DIAGNOSIS — D64.9 NORMOCYTIC ANEMIA: Primary | ICD-10-CM

## 2023-08-20 DIAGNOSIS — R77.1 HYPERGLOBULINEMIA: ICD-10-CM

## 2023-08-30 ENCOUNTER — LAB ENCOUNTER (OUTPATIENT)
Dept: LAB | Age: 73
End: 2023-08-30
Attending: INTERNAL MEDICINE
Payer: MEDICARE

## 2023-08-30 DIAGNOSIS — R77.1 HYPERGLOBULINEMIA: ICD-10-CM

## 2023-08-30 DIAGNOSIS — D64.9 NORMOCYTIC ANEMIA: ICD-10-CM

## 2023-08-30 LAB
DEPRECATED HBV CORE AB SER IA-ACNC: 76.9 NG/ML
ERYTHROCYTE [DISTWIDTH] IN BLOOD BY AUTOMATED COUNT: 13.9 %
FOLATE SERPL-MCNC: 8.7 NG/ML (ref 8.7–?)
HCT VFR BLD AUTO: 39.2 %
HGB BLD-MCNC: 12.6 G/DL
IRON SATN MFR SERPL: 17 %
IRON SERPL-MCNC: 54 UG/DL
MCH RBC QN AUTO: 28.9 PG (ref 26–34)
MCHC RBC AUTO-ENTMCNC: 32.1 G/DL (ref 31–37)
MCV RBC AUTO: 89.9 FL
PLATELET # BLD AUTO: 192 10(3)UL (ref 150–450)
RBC # BLD AUTO: 4.36 X10(6)UL
TIBC SERPL-MCNC: 319 UG/DL (ref 240–450)
TRANSFERRIN SERPL-MCNC: 214 MG/DL (ref 200–360)
VIT B12 SERPL-MCNC: 329 PG/ML (ref 193–986)
WBC # BLD AUTO: 6 X10(3) UL (ref 4–11)

## 2023-08-30 PROCEDURE — 82607 VITAMIN B-12: CPT | Performed by: INTERNAL MEDICINE

## 2023-08-30 PROCEDURE — 84165 PROTEIN E-PHORESIS SERUM: CPT

## 2023-08-30 PROCEDURE — 82746 ASSAY OF FOLIC ACID SERUM: CPT | Performed by: INTERNAL MEDICINE

## 2023-08-30 PROCEDURE — 85027 COMPLETE CBC AUTOMATED: CPT

## 2023-08-30 PROCEDURE — 86334 IMMUNOFIX E-PHORESIS SERUM: CPT

## 2023-08-30 PROCEDURE — 82728 ASSAY OF FERRITIN: CPT

## 2023-08-30 PROCEDURE — 83540 ASSAY OF IRON: CPT

## 2023-08-30 PROCEDURE — 83550 IRON BINDING TEST: CPT

## 2023-08-30 PROCEDURE — 36415 COLL VENOUS BLD VENIPUNCTURE: CPT

## 2023-08-31 ENCOUNTER — NURSE TRIAGE (OUTPATIENT)
Dept: INTERNAL MEDICINE CLINIC | Facility: CLINIC | Age: 73
End: 2023-08-31

## 2023-08-31 NOTE — TELEPHONE ENCOUNTER
Action Requested: Summary for Provider     []  Critical Lab, Recommendations Needed  [x] Need Additional Advice  []   FYI    []   Need Orders  [] Need Medications Sent to Pharmacy  []  Other     Dr Elva Ellington, please advise    Can you add the patient for an appt Friday or Saturday for new cough symptoms? Patient was asking for a refill for predisone. Explained to patient that it is recommended for the patient to be evaluated first.  [facility] office appts at other locations. SUMMARY: Per Protocol disposition advised to be seen for new cough symptoms. Patient was asking for a refill for predisone. Explained to patient that it is recommended for the patient to be evaluated first.  [facility] office appts at other locations. Message sent to PCP. Reason for call: Cough  Onset: 3 days    Patient (name and  verified) c/o cough that started a few days ago. Patient states that he has a hx of asthma. Patient denies any shortness of breath or chest pain. Patient has not been using his inhaler, denies wheezing.      Reason for Disposition   Patient wants to be seen    Protocols used: Cough-A-OH

## 2023-09-01 ENCOUNTER — HOSPITAL ENCOUNTER (OUTPATIENT)
Dept: GENERAL RADIOLOGY | Age: 73
Discharge: HOME | End: 2023-09-01
Attending: INTERNAL MEDICINE
Payer: MEDICARE

## 2023-09-01 ENCOUNTER — OFFICE VISIT (OUTPATIENT)
Dept: INTERNAL MEDICINE CLINIC | Facility: CLINIC | Age: 73
End: 2023-09-01

## 2023-09-01 VITALS
OXYGEN SATURATION: 95 % | WEIGHT: 217.19 LBS | SYSTOLIC BLOOD PRESSURE: 135 MMHG | DIASTOLIC BLOOD PRESSURE: 77 MMHG | HEART RATE: 67 BPM | TEMPERATURE: 97 F | HEIGHT: 65 IN | BODY MASS INDEX: 36.19 KG/M2

## 2023-09-01 DIAGNOSIS — R05.1 ACUTE COUGH: Primary | ICD-10-CM

## 2023-09-01 DIAGNOSIS — D50.9 IRON DEFICIENCY ANEMIA, UNSPECIFIED IRON DEFICIENCY ANEMIA TYPE: ICD-10-CM

## 2023-09-01 DIAGNOSIS — R05.1 ACUTE COUGH: ICD-10-CM

## 2023-09-01 LAB
ALBUMIN SERPL ELPH-MCNC: 3.38 G/DL (ref 3.75–5.21)
ALBUMIN/GLOB SERPL: 0.78 {RATIO} (ref 1–2)
ALPHA1 GLOB SERPL ELPH-MCNC: 0.43 G/DL (ref 0.19–0.46)
ALPHA2 GLOB SERPL ELPH-MCNC: 0.81 G/DL (ref 0.48–1.05)
B-GLOBULIN SERPL ELPH-MCNC: 1.12 G/DL (ref 0.68–1.23)
GAMMA GLOB SERPL ELPH-MCNC: 1.96 G/DL (ref 0.62–1.7)
PROT SERPL-MCNC: 7.7 G/DL (ref 6.4–8.2)

## 2023-09-01 PROCEDURE — 1126F AMNT PAIN NOTED NONE PRSNT: CPT | Performed by: INTERNAL MEDICINE

## 2023-09-01 PROCEDURE — 71046 X-RAY EXAM CHEST 2 VIEWS: CPT | Performed by: INTERNAL MEDICINE

## 2023-09-01 PROCEDURE — 99214 OFFICE O/P EST MOD 30 MIN: CPT | Performed by: INTERNAL MEDICINE

## 2023-09-01 RX ORDER — BENZONATATE 100 MG/1
100 CAPSULE ORAL 3 TIMES DAILY PRN
Qty: 30 CAPSULE | Refills: 0 | Status: SHIPPED | OUTPATIENT
Start: 2023-09-01

## 2023-09-01 RX ORDER — AMOXICILLIN AND CLAVULANATE POTASSIUM 875; 125 MG/1; MG/1
1 TABLET, FILM COATED ORAL 2 TIMES DAILY
Qty: 14 TABLET | Refills: 0 | Status: SHIPPED | OUTPATIENT
Start: 2023-09-01

## 2023-09-01 RX ORDER — DOXYCYCLINE 100 MG/1
100 TABLET ORAL 2 TIMES DAILY
Qty: 14 TABLET | Refills: 0 | Status: SHIPPED | OUTPATIENT
Start: 2023-09-01

## 2023-09-02 ENCOUNTER — TELEPHONE (OUTPATIENT)
Dept: INTERNAL MEDICINE CLINIC | Facility: CLINIC | Age: 73
End: 2023-09-02

## 2023-09-02 NOTE — TELEPHONE ENCOUNTER
Prior Authorization      benzonatate (TESSALON PERLES) 100 MG Oral Cap, Take 1 capsule (100 mg total) by mouth 3 (three) times daily as needed for cough. , Disp: 30 capsule, Rfl: 0    [id number]

## 2023-09-05 ENCOUNTER — HOSPITAL ENCOUNTER (OUTPATIENT)
Dept: GENERAL RADIOLOGY | Age: 73
Discharge: HOME | End: 2023-09-05
Attending: INTERNAL MEDICINE
Payer: MEDICARE

## 2023-09-05 ENCOUNTER — OFFICE VISIT (OUTPATIENT)
Dept: INTERNAL MEDICINE CLINIC | Facility: CLINIC | Age: 73
End: 2023-09-05

## 2023-09-05 VITALS
BODY MASS INDEX: 36 KG/M2 | HEART RATE: 81 BPM | TEMPERATURE: 98 F | DIASTOLIC BLOOD PRESSURE: 82 MMHG | WEIGHT: 215 LBS | SYSTOLIC BLOOD PRESSURE: 147 MMHG

## 2023-09-05 DIAGNOSIS — J90 PLEURAL EFFUSION, LEFT: ICD-10-CM

## 2023-09-05 DIAGNOSIS — J18.9 COMMUNITY ACQUIRED PNEUMONIA, UNSPECIFIED LATERALITY: ICD-10-CM

## 2023-09-05 DIAGNOSIS — J18.9 COMMUNITY ACQUIRED PNEUMONIA, UNSPECIFIED LATERALITY: Primary | ICD-10-CM

## 2023-09-05 PROCEDURE — 1126F AMNT PAIN NOTED NONE PRSNT: CPT | Performed by: INTERNAL MEDICINE

## 2023-09-05 PROCEDURE — 71046 X-RAY EXAM CHEST 2 VIEWS: CPT | Performed by: INTERNAL MEDICINE

## 2023-09-05 PROCEDURE — 99213 OFFICE O/P EST LOW 20 MIN: CPT | Performed by: INTERNAL MEDICINE

## 2023-09-05 RX ORDER — CODEINE PHOSPHATE AND GUAIFENESIN 10; 100 MG/5ML; MG/5ML
5 SOLUTION ORAL EVERY 6 HOURS PRN
Qty: 120 ML | Refills: 0 | Status: SHIPPED | OUTPATIENT
Start: 2023-09-05

## 2023-09-06 NOTE — PROGRESS NOTES
Subjective:     Patient ID: Jesus Villa is a 68year old male. Pt presents today for ffup. I had seen him last week with complaints of coughing, wheezing and some SOB. Cxr done showed bibasilar infiltrates, possible pneumonia or atelectasis. I gave pt augmentin and doxycline to cover for comm acquired pneumona. He states he is feeling better today. We had cxr done least week with possible bibasilar pneumonia. Pt was given dual abx coverage. Continue with his  inhalers. History/Other:   Review of Systems   Constitutional: Negative. Respiratory:  Positive for cough. Negative for shortness of breath and wheezing. Cardiovascular: Negative. Current Outpatient Medications   Medication Sig Dispense Refill    guaiFENesin-codeine (CHERATUSSIN AC) 100-10 MG/5ML Oral Solution Take 5 mL by mouth every 6 (six) hours as needed for cough. 120 mL 0    benzonatate (TESSALON PERLES) 100 MG Oral Cap Take 1 capsule (100 mg total) by mouth 3 (three) times daily as needed for cough. 30 capsule 0    amoxicillin clavulanate 875-125 MG Oral Tab Take 1 tablet by mouth 2 (two) times daily. 14 tablet 0    Doxycycline Monohydrate 100 MG Oral Tab Take 100 mg by mouth 2 (two) times daily. 14 tablet 0    apixaban (ELIQUIS) 2.5 MG Oral Tab Take 1 tablet (2.5 mg total) by mouth 2 (two) times daily. 60 tablet 5    metoprolol tartrate 50 MG Oral Tab Take 1 tablet (50 mg total) by mouth 2 (two) times daily. 180 tablet 1    finasteride 5 MG Oral Tab Take 1 tablet (5 mg total) by mouth daily. 90 tablet 3    predniSONE 10 MG Oral Tab Take 4tabs po x2d then  3 tabs po x2d,then 2 tabs x2d,then 1tab x2d 20 tablet 0    amLODIPine 5 MG Oral Tab Take 1 tablet (5 mg total) by mouth daily. 90 tablet 1    famotidine 20 MG Oral Tab Take 1 tablet (20 mg total) by mouth 2 (two) times daily. 180 tablet 3    furosemide 40 MG Oral Tab Take 1 tablet (40 mg total) by mouth daily.  90 tablet 1    atorvastatin 20 MG Oral Tab Take 1 tablet (20 mg total) by mouth nightly. 90 tablet 3    fluticasone-salmeterol 115-21 MCG/ACT Inhalation Aerosol Inhale 2 puffs into the lungs 2 (two) times daily. FOR ASTHMA 3 each 3    albuterol (2.5 MG/3ML) 0.083% Inhalation Nebu Soln Take 3 mL (2.5 mg total) by nebulization every 6 (six) hours as needed for Wheezing or Shortness of Breath. 30 each 1    albuterol 108 (90 Base) MCG/ACT Inhalation Aero Soln Inhale 2 puffs into the lungs every 6 (six) hours as needed for Wheezing or Shortness of Breath. 3 each 1    sennosides (GOODSENSE SENNA LAXATIVE) 8.6 MG Oral Tab Take 1 tablet (8.6 mg total) by mouth 2 (two) times daily. 180 tablet 0    Sennosides 15 MG Oral Tab Take 1 tablet (15 mg total) by mouth daily as needed. Take by mouth. 90 tablet 0    tamsulosin HCl 0.4 MG Oral Cap Take 2 capsules (0.8 mg total) by mouth daily. 180 capsule 3    MULTIPLE VITAMINS-MINERALS OR 1 daily      Ciclopirox 8 % External Solution Apply 1 Application topically nightly. To nails 100 mL 5    methylPREDNISolone (MEDROL) 4 MG Oral Tablet Therapy Pack As directed. (Patient not taking: Reported on 9/1/2023) 21 each 0    Spacer/Aero-Holding Chambers Does not apply Device Use with inhalers as directed 1 each 3    Spacer/Aero-Holding Chambers (AEROCHAMBER MINI CHAMBER) Does not apply Device Use with inhalers 1 Device 0     Allergies:   Ace Inhibitors          Coughing    Past Medical History:   Diagnosis Date    Acute, but ill-defined, cerebrovascular disease     BPH (benign prostatic hyperplasia)     Diabetes ([address]     Encounter for follow-up of acute deep vein thrombosis (DVT) of right lower extremity     Hip fracture, right ([address]     2019    Hyperlipidemia     Moderate persistent asthma with acute exacerbation 8/23/2021    Osteoarthritis     Scoliosis     Unspecified essential hypertension       Past Surgical History:   Procedure Laterality Date    ELECTROCARDIOGRAM, COMPLETE  11/19/2013    SCANNED TO MEDIA TAB - 11/19/2013    OTHER SURGICAL HISTORY right hip pinning for hip fracture    TONSILLECTOMY        Family History   Problem Relation Age of Onset    Heart Disease Father     Cancer Father     Hypertension Father     Dementia Mother         Alzheimer's Disease    Other (Scoliosis) Sister     Other (Scoliosis) Brother     Diabetes Paternal Grandfather       Social History:   Social History     Socioeconomic History    Marital status:    Tobacco Use    Smoking status: Never     Passive exposure: Never    Smokeless tobacco: Never   Vaping Use    Vaping Use: Never used   Substance and Sexual Activity    Alcohol use: Yes     Alcohol/week: 0.0 standard drinks of alcohol     Comment: occ     Drug use: No   Other Topics Concern    Caffeine Concern Yes     Comment: tea, 2 cups per day     Exercise Yes     Comment: walking   Social History Narrative    The patient does not use an assistive device. .      The patient does not live in a home with stairs. Objective:   Physical Exam  Constitutional:       General: He is not in acute distress. Appearance: He is obese. He is not ill-appearing, toxic-appearing or diaphoretic. HENT:      Right Ear: External ear normal.      Left Ear: External ear normal.   Eyes:      General:         Right eye: No discharge. Left eye: No discharge. Cardiovascular:      Rate and Rhythm: Normal rate and regular rhythm. Heart sounds: Normal heart sounds. No murmur heard. Pulmonary:      Effort: Pulmonary effort is normal. No respiratory distress. Breath sounds: Normal breath sounds. No stridor. No wheezing or rales. Musculoskeletal:      Cervical back: Normal range of motion and neck supple. No rigidity or tenderness. Right lower leg: Edema present. Left lower leg: Edema present. Comments: Chronic bilateral lymphedema both legs   Lymphadenopathy:      Cervical: No cervical adenopathy.          Assessment & Plan:   (J18.9) Community acquired pneumonia, unspecified laterality  (primary encounter diagnosis)  Plan: XR CHEST PA + LAT CHEST (CPT=71046)        Pt feeling better with less coughing; had started abx as prescribed but not using his steroid inhaler as directed so told him to take 2puffs bid of his steroid inhaler, we will recheck his cxr     (J90) Pleural effusion, left  Plan: XR CHEST PA + LAT CHEST (RZR=79973)         Had small amout of left pleural effusionm on cxr last week; will recheck cxr. No orders of the defined types were placed in this encounter. Meds This Visit:  Requested Prescriptions     Signed Prescriptions Disp Refills    guaiFENesin-codeine (CHERATUSSIN AC) 100-10 MG/5ML Oral Solution 120 mL 0     Sig: Take 5 mL by mouth every 6 (six) hours as needed for cough.        Imaging & Referrals:  None

## 2023-09-20 NOTE — TELEPHONE ENCOUNTER
Await MD approval on Finasteride, pls advise, thanks in advance. Review pended refill request as it does not fall under a protocol.   Requested Prescriptions     Pending Prescriptions Disp Refills   • AMLODIPINE BESYLATE 5 MG Oral Tab [Pharmacy Med Name
Call pt; he is due for labs for his over due for his psa. Will also need to do his other labs. Order in epic.
Refill passed per [facility], [facility] protocol.     Hypertensive Medications  Protocol Criteria:  · Appointment scheduled in the past 6 months or in the next 3 months  · BMP or CMP in the past 12 months  · Creatinine result < 2  Recent Outpatient Visits
Spoke with patient to inform of lab orders placed. Patient verbalized understanding.
Condition:: Insect sting
Please Describe Your Condition:: Patient reports she was stung by a wasp on Sunday by a wasp on the back of her right arm. Lesion is swollen, red and painful. She is using TAC 0.1% and Benadryl Itch Gel. She did take oral Benadryl to help with the itching. She feels that the itching has improved as of today but lesion is getting worse.

## 2023-09-25 NOTE — TELEPHONE ENCOUNTER
Please review. Protocol failed / Has no protocol.      Requested Prescriptions   Pending Prescriptions Disp Refills    FUROSEMIDE 40 MG Oral Tab [Pharmacy Med Name: FUROSEMIDE 40MG TABLETS] 90 tablet 1     Sig: TAKE 1 TABLET(40 MG) BY MOUTH DAILY       Hypertensive Medications Protocol Failed - 9/24/2023  7:10 AM        Failed - Last BP reading less than 140/90     BP Readings from Last 1 Encounters:  09/05/23 : 147/82              Failed - EGFRCR or GFRNAA > 50     GFR Evaluation  EGFRCR: 44 , resulted on 8/17/2023          Passed - In person appointment in the past 12 or next 3 months     Recent Outpatient Visits              2 weeks ago Community acquired pneumonia, unspecified laterality    [facility], [address], Sukhi Calloway MD    Office Visit    3 weeks ago Acute cough    [address], [address], Sukhi Calloway MD    Office Visit    4 months ago Severe persistent asthma with acute exacerbation    [facility], [address], Ezequiel Zarate MD    Office Visit    6 months ago Chronic obstructive pulmonary disease with acute exacerbation [facility])    [address], [address], LIAM Weiss    Office Visit    7 months ago Moderate persistent asthma with acute exacerbation    [address], [address], Chastity Leslie MD    Office Visit          Future Appointments         Provider Department Appt Notes    In 2 weeks Sofía Zarate MD [address] [facility], Ezequiel CHUA    In 2 months [address] [place] [address], [facility] 19 Hematology Oncology annual f/u caf                    Passed - CMP or BMP in past 6 months     Recent Results (from the past 4392 hour(s))   Comp Metabolic Panel (14)    Collection Time: 08/17/23  1:19 PM   Result Value Ref Range    Glucose 98 70 - 99 mg/dL    Sodium 135 (L) 136 - 145 mmol/L    Potassium 4.4 3.5 - 5.1 mmol/L    Chloride 103 98 - 112 mmol/L    CO2 28.0 21.0 - 32.0 mmol/L    Anion Gap 4 0 - 18 mmol/L    BUN 19 (H) 7 - 18 mg/dL    Creatinine 1.64 (H) 0.70 - 1.30 mg/dL    Calcium, Total 8.4 (L) 8.5 - 10.1 mg/dL    Calculated Osmolality 282 275 - 295 mOsm/kg    eGFR-Cr 44 (L) >=60 mL/min/1.73m2    AST 13 (L) 15 - 37 U/L    ALT 15 (L) 16 - 61 U/L    Alkaline Phosphatase 95 45 - 117 U/L    Bilirubin, Total 0.7 0.1 - 2.0 mg/dL    Total Protein 7.9 6.4 - 8.2 g/dL    Albumin 3.0 (L) 3.4 - 5.0 g/dL    Globulin  4.9 (H) 2.8 - 4.4 g/dL    A/G Ratio 0.6 (L) 1.0 - 2.0    Patient Fasting for CMP? No      *Note: Due to a large number of results and/or encounters for the requested time period, some results have not been displayed. A complete set of results can be found in Results Review.                Passed - In person appointment or virtual visit in the past 6 months     Recent Outpatient Visits              2 weeks ago Community acquired pneumonia, unspecified laterality    [facility], [address], Pilar Fay MD    Office Visit    3 weeks ago Acute cough    Pilar Vines MD    Office Visit    4 months ago Severe persistent asthma with acute exacerbation    [facility], [address], Ezequiel Pelayo MD    Office Visit    6 months ago Chronic obstructive pulmonary disease with acute exacerbation [facility])    [address], [address], LIAM Sanchez    Office Visit    7 months ago Moderate persistent asthma with acute exacerbation    [address], [address], Lissette Piedra MD    Office Visit          Future Appointments         Provider Department Appt Notes    In 2 weeks MD Yanique Escamilla, Ez[place] CHUA    In 2 months Taran Eastman 19 Hematology Oncology annual f/u caf AMLODIPINE 5 MG Oral Tab [Pharmacy Med Name: AMLODIPINE BESYLATE 5MG TABLETS] 90 tablet 1     Sig: TAKE 1 TABLET(5 MG) BY MOUTH DAILY       Hypertensive Medications Protocol Failed - 2023  7:10 AM        Failed - Last BP reading less than 140/90     BP Readings from Last 1 Encounters:  23 : 147/82              Failed - EGFRCR or GFRNAA > 50     GFR Evaluation  EGFRCR: 44 , resulted on 2023          Passed - In person appointment in the past 12 or next 3 months     Recent Outpatient Visits              2 weeks ago Community acquired pneumonia, unspecified laterality    [facility], [address], Anthony Riojas MD    Office Visit    3 weeks ago Acute cough    [address] [facility], Anthony Riojas MD    Office Visit    4 months ago Severe persistent asthma with acute exacerbation    [facility], [address], Ezequiel Thrasher MD    Office Visit    6 months ago Chronic obstructive pulmonary disease with acute exacerbation [facility])    [address], [facility] Ezequiel Harrison, LIAM    Office Visit    7 months ago Moderate persistent asthma with acute exacerbation    [facility] Jace Escalona MD    Office Visit          Future Appointments         Provider Department Appt Notes    In 2 weeks Maria Elena Thrasher MD [address] [facility], Ezequiel CHUA    In 2 months [address] [place] [address], [facility], [facility] 19 Hematology Oncology annual f/u caf                    Passed - CMP or BMP in past 6 months     Recent Results (from the past 4392 hour(s))   Comp Metabolic Panel (14)    Collection Time: 23  1:19 PM   Result Value Ref Range    Glucose 98 70 - 99 mg/dL    Sodium 135 (L) 136 - 145 mmol/L    Potassium 4.4 3.5 - 5.1 mmol/L    Chloride 103 98 - 112 mmol/L    CO2 28.0 21.0 - 32.0 mmol/L    Anion Gap 4 0 - 18 mmol/L    BUN 19 (H) 7 - 18 mg/dL    Creatinine 1.64 (H) 0.70 - 1.30 mg/dL    Calcium, Total 8.4 (L) 8.5 - 10.1 mg/dL    Calculated Osmolality 282 275 - 295 mOsm/kg    eGFR-Cr 44 (L) >=60 mL/min/1.73m2    AST 13 (L) 15 - 37 U/L    ALT 15 (L) 16 - 61 U/L    Alkaline Phosphatase 95 45 - 117 U/L    Bilirubin, Total 0.7 0.1 - 2.0 mg/dL    Total Protein 7.9 6.4 - 8.2 g/dL    Albumin 3.0 (L) 3.4 - 5.0 g/dL    Globulin  4.9 (H) 2.8 - 4.4 g/dL    A/G Ratio 0.6 (L) 1.0 - 2.0    Patient Fasting for CMP? No      *Note: Due to a large number of results and/or encounters for the requested time period, some results have not been displayed. A complete set of results can be found in Results Review.                Passed - In person appointment or virtual visit in the past 6 months     Recent Outpatient Visits              2 weeks ago Community acquired pneumonia, unspecified laterality    [facility], [address], Ray Laurent MD    Office Visit    3 weeks ago Acute cough    [address] [facility], Ray Laurent MD    Office Visit    4 months ago Severe persistent asthma with acute exacerbation    [facility], [address], Ezequiel Augustine MD    Office Visit    6 months ago Chronic obstructive pulmonary disease with acute exacerbation [facility])    [address], [address], LIAM Rodriguez    Office Visit    7 months ago Moderate persistent asthma with acute exacerbation    [address], [address], Ernestina Carpio MD    Office Visit          Future Appointments         Provider Department Appt Notes    In 2 weeks Km Augustine MD [address] [facility], Ezequiel CHUA    In 2 months Taran Tamezador 19 Hematology Oncology annual f/u caf                       Future Appointments         Provider Department Appt Notes    In 2 weeks Km Augustine MD [address] [facility], Ezequiel MAPX    In 2 months Rachel Patricio MD [facility] Hematology Oncology annual f/u caf           Recent Outpatient Visits              2 weeks ago Community acquired pneumonia, unspecified laterality    [address] [facility], Munir Hughes MD    Office Visit    3 weeks ago Acute cough    [address], [address], Munir Hughes MD    Office Visit    4 months ago Severe persistent asthma with acute exacerbation    [address], [address], Ezequiel Hinton MD    Office Visit    6 months ago Chronic obstructive pulmonary disease with acute exacerbation [facility])    [address], [address], LIAM Carlos    Office Visit    7 months ago Moderate persistent asthma with acute exacerbation    [address] [facility], Kortney Smith MD    Office Visit

## 2023-09-26 RX ORDER — FUROSEMIDE 40 MG/1
40 TABLET ORAL DAILY
Qty: 90 TABLET | Refills: 1 | Status: SHIPPED | OUTPATIENT
Start: 2023-09-26

## 2023-09-26 RX ORDER — AMLODIPINE BESYLATE 5 MG/1
5 TABLET ORAL DAILY
Qty: 90 TABLET | Refills: 1 | Status: SHIPPED | OUTPATIENT
Start: 2023-09-26

## 2023-12-24 RX ORDER — PREDNISONE 10 MG/1
TABLET ORAL
Qty: 20 TABLET | Refills: 0 | OUTPATIENT
Start: 2023-12-24

## 2023-12-26 NOTE — TELEPHONE ENCOUNTER
Pharmacy requesting refill     metoprolol tartrate 50 MG Oral Tab Take 1 tablet (50 mg total) by mouth 2 (two) times daily.  180 tablet 1

## 2023-12-28 RX ORDER — METOPROLOL TARTRATE 50 MG/1
50 TABLET, FILM COATED ORAL 2 TIMES DAILY
Qty: 180 TABLET | Refills: 1 | Status: SHIPPED | OUTPATIENT
Start: 2023-12-28

## 2023-12-28 NOTE — TELEPHONE ENCOUNTER
Please review. Protocol Failed or has No Protocol. Requested Prescriptions   Pending Prescriptions Disp Refills    metoprolol tartrate 50 MG Oral Tab 180 tablet 3     Sig: Take 1 tablet (50 mg total) by mouth 2 (two) times daily.        Hypertensive Medications Protocol Failed - 12/26/2023  4:50 PM        Failed - Last BP reading less than 140/90     BP Readings from Last 1 Encounters:   09/05/23 147/82               Failed - EGFRCR or GFRNAA > 50     GFR Evaluation  EGFRCR: 44 , resulted on 8/17/2023          Passed - In person appointment in the past 12 or next 3 months     Recent Outpatient Visits              3 months ago Community acquired pneumonia, unspecified laterality    [address], [facility], Paul Corado MD    Office Visit    3 months ago Acute cough    [address] [facility], Paul Corado MD    Office Visit    7 months ago Severe persistent asthma with acute exacerbation    [facility], Paul Corado MD    Office Visit    9 months ago Chronic obstructive pulmonary disease with acute exacerbation [facility])    [address], [facility], LIAM Walker    Office Visit    10 months ago Moderate persistent asthma with acute exacerbation    [address] [facility], Lou Enriquez MD    Office Visit          Future Appointments         Provider Department Appt Notes    In 6 months Joon Hylton MD [address], [address], [place]                Passed - CMP or BMP in past 6 months     Recent Results (from the past 4392 hour(s))   Comp Metabolic Panel (14)    Collection Time: 08/17/23  1:19 PM   Result Value Ref Range    Glucose 98 70 - 99 mg/dL    Sodium 135 (L) 136 - 145 mmol/L    Potassium 4.4 3.5 - 5.1 mmol/L    Chloride 103 98 - 112 mmol/L    CO2 28.0 21.0 - 32.0 mmol/L    Anion Gap 4 0 - 18 mmol/L    BUN 19 (H) 7 - 18 mg/dL    Creatinine 1.64 (H) 0.70 - 1.30 mg/dL    Calcium, Total 8.4 (L) 8.5 - 10.1 mg/dL    Calculated Osmolality 282 275 - 295 mOsm/kg    eGFR-Cr 44 (L) >=60 mL/min/1.73m2    AST 13 (L) 15 - 37 U/L    ALT 15 (L) 16 - 61 U/L    Alkaline Phosphatase 95 45 - 117 U/L    Bilirubin, Total 0.7 0.1 - 2.0 mg/dL    Total Protein 7.9 6.4 - 8.2 g/dL    Albumin 3.0 (L) 3.4 - 5.0 g/dL    Globulin  4.9 (H) 2.8 - 4.4 g/dL    A/G Ratio 0.6 (L) 1.0 - 2.0    Patient Fasting for CMP? No      *Note: Due to a large number of results and/or encounters for the requested time period, some results have not been displayed. A complete set of results can be found in Results Review.                Passed - In person appointment or virtual visit in the past 6 months     Recent Outpatient Visits              3 months ago Community acquired pneumonia, unspecified laterality    [facility], [address], Braden Muller MD    Office Visit    3 months ago Acute cough    [facility], Braden Muller MD    Office Visit    7 months ago Severe persistent asthma with acute exacerbation    [facility], [address], Ezequiel Baez MD    Office Visit    9 months ago Chronic obstructive pulmonary disease with acute exacerbation [facility])    [address], [address], LIMA Otoole    Office Visit    10 months ago Moderate persistent asthma with acute exacerbation    [facility]    [facility]          Future Appointments         Provider Department Appt Notes    In 6 months Armando Basilio MD [address], [address] [facility],[address] [facility], [place]                     Future Appointments         Provider Department Appt Notes    In 6 months Armando Basilio MD [address], [address] [facility],[address], [place]             Recent Outpatient Visits 3 months ago Community acquired pneumonia, unspecified laterality    Ana Jose MD    Office Visit    3 months ago Acute cough    [address], [address], Ana Armstrong MD    Office Visit    7 months ago Severe persistent asthma with acute exacerbation    [address], [address], Ana Armstrong MD    Office Visit    9 months ago Chronic obstructive pulmonary disease with acute exacerbation [facility], [address], LIAM Shaikh    Office Visit    10 months ago Moderate persistent asthma with acute exacerbation    [facility], Dorrie Hodgkin, MD    Office Visit

## 2024-01-08 ENCOUNTER — NURSE TRIAGE (OUTPATIENT)
Dept: INTERNAL MEDICINE CLINIC | Facility: CLINIC | Age: 74
End: 2024-01-08

## 2024-01-08 NOTE — TELEPHONE ENCOUNTER
Patient's friend Vanesa (not on MARIO) calling in behalf of patient for a refill request on an antibiotic.   Advised friend we would have to speak to patient due to her not being on MARIO.   Vanesa verbalized understanding and stated she will have patient call to speak to a nurse.

## 2024-01-08 NOTE — TELEPHONE ENCOUNTER
Action Requested: Summary for Provider     []  Critical Lab, Recommendations Needed  [x] Need Additional Advice  []   FYI    []   Need Orders  [x] Need Medications Sent to Pharmacy  []  Other     SUMMARY: Verified name and .    Patient reports cough- he was last seen in office on 23 for pneumonia. Patient denies any difficulty breathing or chest pain at this this- no audible dyspnea heard on call- patient able to speak full sentences with no issues. He state that he does not believe that pneumonia has completely resolved.    He is requesting that Dr. Clifford prescribed amoxicillin-clavulanate as he did at last office visit for symptoms.     Patient was advised that office visit is required for evaluation. Patient declines office visit and is requesting that message be sent to Dr. Clifford to request antibiotic.    Reason for call: Acute  Onset: Data Unavailable        Reason for Disposition   Cough has been present for > 3 weeks    Protocols used: Cough-A-OH

## 2024-01-08 NOTE — TELEPHONE ENCOUNTER
He needs to be seen in the clnic for evaluation since his last visit had been 3mos ago for cough.

## 2024-01-08 NOTE — TELEPHONE ENCOUNTER
Spoke to patient and relayed Dr. Clifford's message below. Patient verbalized understanding. He asked what he could take that was \"just as good as what he got before.\" RN relayed that if he has an infection, antibiotics would be the best medication but he needs to be evaluated to determine if they are needed. Relayed there is symptom management medication and home care measures but they are for symptoms and don't cure the underlying illness.     He said, \"ok, I need to get better first- and disconnected call.\"

## 2024-01-12 ENCOUNTER — HOSPITAL ENCOUNTER (OUTPATIENT)
Age: 74
Discharge: HOME | End: 2024-01-12
Payer: MEDICARE

## 2024-01-12 ENCOUNTER — APPOINTMENT (OUTPATIENT)
Dept: GENERAL RADIOLOGY | Age: 74
End: 2024-01-12
Attending: PHYSICIAN ASSISTANT
Payer: MEDICARE

## 2024-01-12 VITALS
SYSTOLIC BLOOD PRESSURE: 119 MMHG | HEART RATE: 65 BPM | TEMPERATURE: 97 F | RESPIRATION RATE: 20 BRPM | OXYGEN SATURATION: 92 % | DIASTOLIC BLOOD PRESSURE: 74 MMHG

## 2024-01-12 DIAGNOSIS — R05.1 ACUTE COUGH: Primary | ICD-10-CM

## 2024-01-12 DIAGNOSIS — J01.90 ACUTE NON-RECURRENT SINUSITIS, UNSPECIFIED LOCATION: ICD-10-CM

## 2024-01-12 PROCEDURE — 71046 X-RAY EXAM CHEST 2 VIEWS: CPT | Performed by: PHYSICIAN ASSISTANT

## 2024-01-12 PROCEDURE — 99204 OFFICE O/P NEW MOD 45 MIN: CPT | Performed by: PHYSICIAN ASSISTANT

## 2024-01-12 RX ORDER — AMOXICILLIN AND CLAVULANATE POTASSIUM 875; 125 MG/1; MG/1
1 TABLET, FILM COATED ORAL 2 TIMES DAILY
Qty: 14 TABLET | Refills: 0 | Status: SHIPPED | OUTPATIENT
Start: 2024-01-12 | End: 2024-01-19

## 2024-01-12 RX ORDER — ALBUTEROL SULFATE 90 UG/1
2 AEROSOL, METERED RESPIRATORY (INHALATION) EVERY 4 HOURS PRN
Qty: 1 EACH | Refills: 0 | Status: SHIPPED | OUTPATIENT
Start: 2024-01-12 | End: 2024-02-11

## 2024-01-12 RX ORDER — CODEINE PHOSPHATE AND GUAIFENESIN 10; 100 MG/5ML; MG/5ML
5 SOLUTION ORAL EVERY 6 HOURS PRN
Qty: 50 ML | Refills: 0 | Status: SHIPPED | OUTPATIENT
Start: 2024-01-12

## 2024-01-12 RX ORDER — BENZONATATE 100 MG/1
100 CAPSULE ORAL 3 TIMES DAILY PRN
Qty: 30 CAPSULE | Refills: 0 | Status: SHIPPED | OUTPATIENT
Start: 2024-01-12 | End: 2024-02-11

## 2024-01-12 NOTE — ED PROVIDER NOTES
Patient Seen in: [facility]    History     Chief Complaint   Patient presents with    Cough     Stated Complaint: Perscription refill, no appetite    HPI    Edwin Crum is a 74 year old male presents with chief complaint of cough.  Onset 1 week ago.  Patient reports associated nasal congestion.  Patient denies fever, chills, earache, sore throat, abdominal pain, nausea, vomiting, diarrhea, constipation, dysuria, hematuria, flank pain, rash, neck pain, neck swelling, restricted neck movement, dyspnea, wheeze, hemoptysis.      Past Medical History:   Diagnosis Date    Acute, but ill-defined, cerebrovascular disease     BPH (benign prostatic hyperplasia)     Diabetes ([facility])     Encounter for follow-up of acute deep vein thrombosis (DVT) of right lower extremity     Hip fracture, right (HCC)     2019    Hyperlipidemia     Moderate persistent asthma with acute exacerbation 8/23/2021    Osteoarthritis     Scoliosis     Unspecified essential hypertension        Past Surgical History:   Procedure Laterality Date    ELECTROCARDIOGRAM, COMPLETE  11/19/2013    SCANNED TO MEDIA TAB - 11/19/2013    OTHER SURGICAL HISTORY      right hip pinning for hip fracture    TONSILLECTOMY              Family History   Problem Relation Age of Onset    Heart Disease Father     Cancer Father     Hypertension Father     Dementia Mother         Alzheimer's Disease    Other (Scoliosis) Sister     Other (Scoliosis) Brother     Diabetes Paternal Grandfather        Social History     Socioeconomic History    Marital status:    Tobacco Use    Smoking status: Never     Passive exposure: Never    Smokeless tobacco: Never   Vaping Use    Vaping Use: Never used   Substance and Sexual Activity    Alcohol use: Yes     Alcohol/week: 0.0 standard drinks of alcohol     Comment: occ     Drug use: No   Other Topics Concern    Caffeine Concern Yes     Comment: tea, 2 cups per day     Exercise Yes     Comment: walking   Social History  Narrative    The patient does not use an assistive device..      The patient does not live in a home with stairs.                                          Review of Systems    Positive for stated complaint: Perscription refill, no appetite  Other systems are as noted in HPI.  Constitutional and vital signs reviewed.      All other systems reviewed and negative except as noted above.    PSFH elements reviewed from today and agreed except as otherwise stated in HPI.    Physical Exam     ED Triage Vitals [01/12/24 1412]   /74   Pulse 65   Resp 20   Temp 97.3 °F (36.3 °C)   Temp src Temporal   SpO2 92 %   O2 Device None (Room air)       Current:/74   Pulse 65   Temp 97.3 °F (36.3 °C) (Temporal)   Resp 20   SpO2 92%     PULSE OX decreased at 92% on room air as interpreted by this provider.     Constitutional: The patient is cooperative. Appears well-developed and well-nourished.  No acute distress.  Psychological: Alert, No abnormalities of mood, affect.  Head: Normocephalic/atraumatic.    Eyes: Pupils are equal round reactive to light.  Conjunctiva are within normal limits.  ENT: Pharynx noninjected.  Tonsils within normal size limits bilaterally.  No tonsillar exudates.  TMs within normal limits bilaterally.  Mucous membranes moist.  Neck: The neck is supple.  No meningeal signs.  Chest: There is no tenderness to the chest wall.  No CVA tenderness bilaterally.  Respiratory: Respiratory effort was normal.  Positive rhonchi bilaterally.  There is no stridor.  Air entry is equal.  Cardiovascular: Regular rate and rhythm.  Capillary refill is brisk.  Genitourinary: Not examined.  Lymphatic: No gross lymphadenopathy noted.  Musculoskeletal: Musculoskeletal system is grossly intact.  There is no obvious deformity.  Neurological: No facial asymmetry.  Normal gait.  Normal sensory exam.  Patient exhibits normal speech.  Strength and range of motion symmetrical of all extremities x4.  Skin: Skin is normal to  inspection.  Warm and dry.  No obvious bruising.  No obvious rash.        ED Course   Labs Reviewed - No data to display    MDM     SpO2 91% on 3/14/2023.  SpO2 92% on 12/20/2022.  This oxygenation level appears to be patient's baseline.    Creatinine clearance-55.3     Radiology findings: XR CHEST PA + LAT CHEST (CPT=71046)    Result Date: 1/12/2024  CONCLUSION:  1. Unchanged chest.  No acute airspace disease.  Moderate elevation of left hemidiaphragm stable.  Mild bibasilar scarring/atelectasis.  Moderately severe rotary thoracolumbar scoliosis.    Dictated by (CST): Elias Rodriguez MD on 1/12/2024 at 2:57 PM     Finalized by (CST): Elias Rodriguez MD on 1/12/2024 at 2:58 PM           Chest x-ray images independently reviewed by this provider-no pneumonia.    Physical exam remained stable over serial reexaminations as previously documented.  Results reviewed with patient.    I have given the patient instructions regarding their diagnoses, expectations, follow up, and ER precautions. I explained to the patient that emergent conditions may arise and to go to the ER for new, worsening or any persistent conditions. I've explained the importance of following up with their doctor as instructed. The patient verbalized understanding of the discharge instructions and plan.      Disposition and Plan     Clinical Impression:  1. Acute cough    2. Acute non-recurrent sinusitis, unspecified location        Disposition:  Discharge    Follow-up:  Car Clifford MD  [address] [place] [address]  [phone]    Call in 1 day  For follow-up      Medications Prescribed:  Current Discharge Medication List        START taking these medications    Details   !! amoxicillin clavulanate 875-125 MG Oral Tab Take 1 tablet by mouth 2 (two) times daily for 7 days.  Qty: 14 tablet, Refills: 0      !! guaiFENesin-codeine (CHERATUSSIN AC) 100-10 MG/5ML Oral Solution Take 5 mL by mouth every 6 (six) hours as needed for  cough.  Qty: 50 mL, Refills: 0    Comments: MD Michael Morrison MD  Associated Diagnoses: Acute cough      !! Spacer/Aero-Holding Chambers Does not apply Device Use with albuterol inhaler  Qty: 1 each, Refills: 0      !! benzonatate 100 MG Oral Cap Take 1 capsule (100 mg total) by mouth 3 (three) times daily as needed for cough.  Qty: 30 capsule, Refills: 0      !! albuterol 108 (90 Base) MCG/ACT Inhalation Aero Soln Inhale 2 puffs into the lungs every 4 (four) hours as needed for Wheezing.  Qty: 1 each, Refills: 0       !! - Potential duplicate medications found. Please discuss with provider.

## 2024-01-12 NOTE — ED INITIAL ASSESSMENT (HPI)
Pt with intermittent cough over a week. + light brown sputum. Patient states he is unsure if O2 sats are always around 92% RA. Denies pain

## 2024-02-05 DIAGNOSIS — R05.1 ACUTE COUGH: ICD-10-CM

## 2024-02-05 NOTE — TELEPHONE ENCOUNTER
Rx pended    Patient gave verbal  authorization friend Vanesa to communicate for him  Requessting refills for: Cough medicine with codein     Tessalon Perles too expensive

## 2024-02-06 NOTE — TELEPHONE ENCOUNTER
[employer] message sent.    Please start taking miralax daily  Please continue your increase of water   Please schedule an appointment with the general surgeom for evaluation of your hemorrhoids    [email or web address]

## 2024-02-09 ENCOUNTER — TELEPHONE (OUTPATIENT)
Dept: INTERNAL MEDICINE CLINIC | Facility: CLINIC | Age: 74
End: 2024-02-09

## 2024-02-09 RX ORDER — CODEINE PHOSPHATE AND GUAIFENESIN 10; 100 MG/5ML; MG/5ML
5 SOLUTION ORAL EVERY 6 HOURS PRN
Qty: 50 ML | Refills: 0 | Status: SHIPPED | OUTPATIENT
Start: 2024-02-09

## 2024-02-09 NOTE — TELEPHONE ENCOUNTER
Spoke with patient,  verified.   Still has some cough since [facility] Care visit.  States cough syrup helps.  Also asking if he can take 2 of the benzonatate 100 mg?  Audible dry cough noted over the phone.  Dr Clifford, please advise on refill of Cheratussin and if he can take benzonatate 200 mg?    Future Appointments   Date Time Provider Department Center   2024  3:30 PM Car Clifford MD [facility]   2024  3:00 PM Marco Lopez MD [facility]

## 2024-02-09 NOTE — TELEPHONE ENCOUNTER
Pt has not logged in to [employer] since 02/16/2023.  Please call patient to see if still having symptoms.  [employer] was sent.

## 2024-02-09 NOTE — TELEPHONE ENCOUNTER
Patient contacts clinic reporting he was recently seen in  and prescribed a wixela inhaler.  He inquires how often he may use it.  Chart reviewed, albuterol was prescribed at , but maintenance inhaler was prescribed by PCP previously.  Patient read inhaler label and described it a s a gray \"hockey puck\"  RN asked patient to locate label and read her the directions.  He confirmed 1 puff twice daily.  He inquires what will happen if he uses it more often.  RN advised against this as it is a maintenance inhaler and should NOT be used more often than prescribed.  He currently states he feels well although some coughing can be heard over the phone.  He denies chest pain or shortness of breath.  Directions again reviewed with patient who verbalizes understanding and compliance.

## 2024-02-12 ENCOUNTER — OFFICE VISIT (OUTPATIENT)
Dept: INTERNAL MEDICINE CLINIC | Facility: CLINIC | Age: 74
End: 2024-02-12

## 2024-02-12 VITALS
OXYGEN SATURATION: 95 % | WEIGHT: 217.88 LBS | SYSTOLIC BLOOD PRESSURE: 115 MMHG | TEMPERATURE: 98 F | DIASTOLIC BLOOD PRESSURE: 70 MMHG | BODY MASS INDEX: 36.3 KG/M2 | HEIGHT: 65 IN | HEART RATE: 74 BPM

## 2024-02-12 DIAGNOSIS — J44.9 COPD, SEVERE (HCC): Primary | ICD-10-CM

## 2024-02-12 DIAGNOSIS — E11.9 CONTROLLED TYPE 2 DIABETES MELLITUS WITHOUT COMPLICATION, WITHOUT LONG-TERM CURRENT USE OF INSULIN (HCC): ICD-10-CM

## 2024-02-12 LAB
CARTRIDGE LOT#: ABNORMAL NUMERIC
HEMOGLOBIN A1C: 6.9 % (ref 4.3–5.6)

## 2024-02-12 PROCEDURE — 99214 OFFICE O/P EST MOD 30 MIN: CPT | Performed by: INTERNAL MEDICINE

## 2024-02-12 PROCEDURE — 83036 HEMOGLOBIN GLYCOSYLATED A1C: CPT | Performed by: INTERNAL MEDICINE

## 2024-02-12 RX ORDER — ALBUTEROL SULFATE 90 UG/1
2 AEROSOL, METERED RESPIRATORY (INHALATION) EVERY 6 HOURS PRN
Qty: 3 EACH | Refills: 1 | Status: SHIPPED | OUTPATIENT
Start: 2024-02-12

## 2024-02-12 RX ORDER — ATORVASTATIN CALCIUM 20 MG/1
20 TABLET, FILM COATED ORAL NIGHTLY
Qty: 90 TABLET | Refills: 1 | Status: SHIPPED | OUTPATIENT
Start: 2024-02-12

## 2024-02-12 RX ORDER — FLUTICASONE FUROATE, UMECLIDINIUM BROMIDE AND VILANTEROL TRIFENATATE 200; 62.5; 25 UG/1; UG/1; UG/1
1 POWDER RESPIRATORY (INHALATION) DAILY
Qty: 1 EACH | Refills: 2 | Status: SHIPPED | OUTPATIENT
Start: 2024-02-12

## 2024-02-12 NOTE — PROGRESS NOTES
Subjective:     Patient ID: Edwin Crum is a 74 year old male.    Cough  This is a new problem. The current episode started more than 1 month ago (5 weeks). The problem has been gradually improving. The problem occurs every few hours. The cough is Non-productive. Associated symptoms include shortness of breath and wheezing. Pertinent negatives include no fever, hemoptysis or weight loss. He has tried a beta-agonist inhaler, steroid inhaler and OTC cough suppressant for the symptoms. His past medical history is significant for COPD and emphysema.       History/Other:   Review of Systems   Constitutional: Negative.  Negative for fever and weight loss.   Respiratory:  Positive for cough, shortness of breath and wheezing. Negative for hemoptysis.         No hemotysis   Cardiovascular: Negative.      Current Outpatient Medications   Medication Sig Dispense Refill    metoprolol tartrate 50 MG Oral Tab Take 1 tablet (50 mg total) by mouth 2 (two) times daily. 180 tablet 1    apixaban (ELIQUIS) 2.5 MG Oral Tab Take 1 tablet (2.5 mg total) by mouth 2 (two) times daily. 60 tablet 5    furosemide 40 MG Oral Tab Take 1 tablet (40 mg total) by mouth daily. 90 tablet 1    amLODIPine 5 MG Oral Tab Take 1 tablet (5 mg total) by mouth daily. 90 tablet 1    benzonatate (TESSALON PERLES) 100 MG Oral Cap Take 1 capsule (100 mg total) by mouth 3 (three) times daily as needed for cough. 30 capsule 0    amoxicillin clavulanate 875-125 MG Oral Tab Take 1 tablet by mouth 2 (two) times daily. 14 tablet 0    finasteride 5 MG Oral Tab Take 1 tablet (5 mg total) by mouth daily. 90 tablet 3    famotidine 20 MG Oral Tab Take 1 tablet (20 mg total) by mouth 2 (two) times daily. 180 tablet 3    atorvastatin 20 MG Oral Tab Take 1 tablet (20 mg total) by mouth nightly. 90 tablet 3    fluticasone-salmeterol 115-21 MCG/ACT Inhalation Aerosol Inhale 2 puffs into the lungs 2 (two) times daily. FOR ASTHMA 3 each 3    albuterol (2.5 MG/3ML) 0.083%  Inhalation Nebu Soln Take 3 mL (2.5 mg total) by nebulization every 6 (six) hours as needed for Wheezing or Shortness of Breath. 30 each 1    albuterol 108 (90 Base) MCG/ACT Inhalation Aero Soln Inhale 2 puffs into the lungs every 6 (six) hours as needed for Wheezing or Shortness of Breath. 3 each 1    tamsulosin HCl 0.4 MG Oral Cap Take 2 capsules (0.8 mg total) by mouth daily. 180 capsule 3    guaiFENesin-codeine (CHERATUSSIN AC) 100-10 MG/5ML Oral Solution Take 5 mL by mouth every 6 (six) hours as needed for cough. (Patient not taking: Reported on 2/12/2024) 50 mL 0    Spacer/Aero-Holding Chambers Does not apply Device Use with albuterol inhaler 1 each 0    guaiFENesin-codeine (CHERATUSSIN AC) 100-10 MG/5ML Oral Solution Take 5 mL by mouth every 6 (six) hours as needed for cough. (Patient not taking: Reported on 2/12/2024) 120 mL 0    Doxycycline Monohydrate 100 MG Oral Tab Take 100 mg by mouth 2 (two) times daily. 14 tablet 0    predniSONE 10 MG Oral Tab Take 4tabs po x2d then  3 tabs po x2d,then 2 tabs x2d,then 1tab x2d (Patient not taking: Reported on 2/12/2024) 20 tablet 0    methylPREDNISolone (MEDROL) 4 MG Oral Tablet Therapy Pack As directed. (Patient not taking: Reported on 9/1/2023) 21 each 0    Spacer/Aero-Holding Chambers Does not apply Device Use with inhalers as directed 1 each 3    sennosides (GOODSENSE SENNA LAXATIVE) 8.6 MG Oral Tab Take 1 tablet (8.6 mg total) by mouth 2 (two) times daily. 180 tablet 0    Spacer/Aero-Holding Chambers (AEROCHAMBER MINI CHAMBER) Does not apply Device Use with inhalers 1 Device 0    Sennosides 15 MG Oral Tab Take 1 tablet (15 mg total) by mouth daily as needed. Take by mouth. 90 tablet 0    MULTIPLE VITAMINS-MINERALS OR 1 daily      Ciclopirox 8 % External Solution Apply 1 Application topically nightly. To nails 100 mL 5     Allergies:  Allergies   Allergen Reactions    Ace Inhibitors Coughing       Past Medical History:   Diagnosis Date    Acute, but ill-defined,  cerebrovascular disease     BPH (benign prostatic hyperplasia)     Diabetes (HCC)     Encounter for follow-up of acute deep vein thrombosis (DVT) of right lower extremity     Hip fracture, right (HCC)     2019    Hyperlipidemia     Moderate persistent asthma with acute exacerbation 8/23/2021    Osteoarthritis     Scoliosis     Unspecified essential hypertension       Past Surgical History:   Procedure Laterality Date    ELECTROCARDIOGRAM, COMPLETE  11/19/2013    SCANNED TO MEDIA TAB - 11/19/2013    OTHER SURGICAL HISTORY      right hip pinning for hip fracture    TONSILLECTOMY        Family History   Problem Relation Age of Onset    Heart Disease Father     Cancer Father     Hypertension Father     Dementia Mother         Alzheimer's Disease    Other (Scoliosis) Sister     Other (Scoliosis) Brother     Diabetes Paternal Grandfather       Social History:   Social History     Socioeconomic History    Marital status:    Tobacco Use    Smoking status: Never     Passive exposure: Never    Smokeless tobacco: Never   Vaping Use    Vaping Use: Never used   Substance and Sexual Activity    Alcohol use: Yes     Alcohol/week: 0.0 standard drinks of alcohol     Comment: occ     Drug use: No   Other Topics Concern    Caffeine Concern Yes     Comment: tea, 2 cups per day     Exercise Yes     Comment: walking   Social History Narrative    The patient does not use an assistive device..      The patient does not live in a home with stairs.                                           Objective:   Physical Exam  Constitutional:       General: He is not in acute distress.     Appearance: He is obese. He is not ill-appearing or toxic-appearing.   Cardiovascular:      Rate and Rhythm: Normal rate and regular rhythm.      Heart sounds: Normal heart sounds. No murmur heard.  Pulmonary:      Effort: Pulmonary effort is normal. No respiratory distress.      Breath sounds: Normal breath sounds. No wheezing or rales.   Abdominal:       General: Bowel sounds are normal. There is no distension.      Palpations: Abdomen is soft. There is no mass.      Tenderness: There is no abdominal tenderness. There is no guarding.   Musculoskeletal:      Cervical back: Normal range of motion and neck supple. No rigidity or tenderness.      Right lower leg: Edema present.      Left lower leg: Edema present.   Lymphadenopathy:      Cervical: No cervical adenopathy.   Neurological:      Mental Status: He is alert.         Assessment & Plan:   (J44.9) COPD, severe (HCC)  (primary encounter diagnosis)  Plan: Pulmonary Referral - In Network        Reviewed pt's PFT done in 2020 and showed then already severe obstructive defect with severely decreased diffusing capacity, CT chest in 2018 also showed emphysema so told pt that his intermittent episodes of wheezing and coughing, is due to severe copd. Pt told to see pulmo and referral given. I had switched his wixela inhaler to trelegy inhaler and he will continue with albuterol inhaler prn.   Pt also advised flu shot, rsv vaccine and latest covid booster which had declined before but should consider getting it     (E11.9) Controlled type 2 diabetes mellitus without complication, without long-term current use of insulin ([facility])  Plan: POC Glycohemoglobin [29379]        We did his A1c and was 6.9 so dm still controlled.  Pt to continue diet.     Pt also told to come back for his medical wellness exam in 4 to 6 weeks.       No orders of the defined types were placed in this encounter.      Meds This Visit:  Requested Prescriptions     Pending Prescriptions Disp Refills    atorvastatin 20 MG Oral Tab 90 tablet 3     Sig: Take 1 tablet (20 mg total) by mouth nightly.       Imaging & Referrals:  None

## 2024-02-14 RX ORDER — ATORVASTATIN CALCIUM 20 MG/1
20 TABLET, FILM COATED ORAL NIGHTLY
Qty: 90 TABLET | Refills: 3 | OUTPATIENT
Start: 2024-02-14

## 2024-02-16 DIAGNOSIS — R05.1 ACUTE COUGH: ICD-10-CM

## 2024-02-16 NOTE — TELEPHONE ENCOUNTER
Patient called, verified Name and . States the cough medicine with codeine he was prescribed was the size of his thumb and will not last; he is almost out of it already. Medication is helping along with Tessalon Perles for cough. He is requesting for a bigger bottle that will at least last him for 2 weeks.     Dr. Clifford please advise. Original script pended with quantity 50 ml.

## 2024-02-19 RX ORDER — CODEINE PHOSPHATE AND GUAIFENESIN 10; 100 MG/5ML; MG/5ML
5 SOLUTION ORAL EVERY 6 HOURS PRN
Qty: 50 ML | Refills: 0 | Status: SHIPPED | OUTPATIENT
Start: 2024-02-19

## 2024-02-19 NOTE — TELEPHONE ENCOUNTER
Verified name and date of birth.   Informed with understanding.     I transferred the call to schedule a pulmonology appointment.

## 2024-02-19 NOTE — TELEPHONE ENCOUNTER
He needs to see pulmonologist as advised for his severe copd as d/w pt on last visit. He was referred to DR Barger but may also see PA so he can be seen sooner. I had changed his inhaler to trelegy inhaler and also should use albuterol inhaler prn as directed. I will give him refill for cough med but should used only prn.

## 2024-03-12 ENCOUNTER — TELEPHONE (OUTPATIENT)
Dept: INTERNAL MEDICINE CLINIC | Facility: CLINIC | Age: 74
End: 2024-03-12

## 2024-03-12 NOTE — TELEPHONE ENCOUNTER
Pt son Edwin on [place]  called wanting to go over pt medication list. Reviewed with son pt medications. No further action is needed.

## 2024-03-22 NOTE — TELEPHONE ENCOUNTER
Please review. Rx failed/no protocol.    Requested Prescriptions   Pending Prescriptions Disp Refills    AMLODIPINE 5 MG Oral Tab [Pharmacy Med Name: AMLODIPINE BESYLATE 5MG TABLETS] 90 tablet 1     Sig: TAKE 1 TABLET(5 MG) BY MOUTH DAILY       Hypertension Medications Protocol Failed - 3/22/2024  5:48 AM        Failed - EGFRCR or GFRNAA > 50     GFR Evaluation  EGFRCR: 44 , resulted on 8/17/2023          Passed - CMP or BMP in past 12 months        Passed - Last BP reading less than 140/90     BP Readings from Last 1 Encounters:   02/12/24 115/70               Passed - In person appointment or virtual visit in the past 12 mos or appointment in next 3 mos     Recent Outpatient Visits              1 month ago COPD, severe ([facility])    [facility] Car Clifford MD    Office Visit    6 months ago Community acquired pneumonia, unspecified laterality    [facility] Car Clifford MD    Office Visit    6 months ago Acute cough    [facility] Car Clifford MD    Office Visit    10 months ago Severe persistent asthma with acute exacerbation ([facility])    [facility] Car Clifford MD    Office Visit    1 year ago Chronic obstructive pulmonary disease with acute exacerbation ([facility])    [facility] Akilah Mac APRN    Office Visit          Future Appointments         Provider Department Appt Notes    In 2 months Brenda Candelaria DO [facility] COPD, severe, mask policy advised    In 3 months Marco Lopez MD [facility]                     Future Appointments         Provider Department Appt Notes    In 2 months Brenda Candelaria DO [facility] COPD, severe, mask policy  advised    In 3 months Marco Lopez MD [facility]           Recent Outpatient Visits              1 month ago COPD, severe ([facility])    [facility] Car Clifford MD    Office Visit    6 months ago Community acquired pneumonia, unspecified laterality    [facility] Car Clifford MD    Office Visit    6 months ago Acute cough    [facility] Car Cardona MD    Office Visit    10 months ago Severe persistent asthma with acute exacerbation ([facility])    [facility] Car Clifford MD    Office Visit    1 year ago Chronic obstructive pulmonary disease with acute exacerbation ([facility])    [facility] Akilah Mac APRN    Office Visit

## 2024-03-23 RX ORDER — AMLODIPINE BESYLATE 5 MG/1
5 TABLET ORAL DAILY
Qty: 90 TABLET | Refills: 1 | Status: SHIPPED | OUTPATIENT
Start: 2024-03-23

## 2024-03-29 DIAGNOSIS — R05.1 ACUTE COUGH: ICD-10-CM

## 2024-03-29 RX ORDER — CODEINE PHOSPHATE AND GUAIFENESIN 10; 100 MG/5ML; MG/5ML
5 SOLUTION ORAL EVERY 6 HOURS PRN
Qty: 50 ML | Refills: 0 | OUTPATIENT
Start: 2024-03-29

## 2024-03-29 NOTE — TELEPHONE ENCOUNTER
Patient with friend Vanesa calling to request refill of     GUAIFENESIN-CODEINE 100-10 MG/5ML Oral Solution     To be sent to [facility] in [place].     Patient has enough left for a half dose at this time.     Please call Vanesa back at [phone]

## 2024-03-29 NOTE — TELEPHONE ENCOUNTER
Pt requesting a refill on guaifenesin-codiene 100-10mg. Requesting medication dispense in a big bottle. Previously dispensed in 2 small bottles.

## 2024-04-02 NOTE — TELEPHONE ENCOUNTER
Please review. Protocol failed/No protocol      Requested Prescriptions   Pending Prescriptions Disp Refills    guaiFENesin-codeine 100-10 MG/5ML Oral Solution 50 mL 0     Sig: Take 5 mL by mouth every 6 (six) hours as needed for cough.       Controlled Substance Medication Failed - 3/29/2024  3:59 PM        Failed - This medication is a controlled substance - forward to provider to refill             Recent Outpatient Visits              1 month ago COPD, severe ([facility]    7 months ago Community acquired pneumonia, unspecified laterality    [facility] Car Clifford MD    Office Visit    7 months ago Acute cough    [facility]    [facility] Visit    10 months ago Severe persistent asthma with acute exacerbation ([facility]    Office Visit    1 year ago Chronic obstructive pulmonary disease with acute exacerbation ([facility])    [facility] Akilah Mac APRN    Office Visit            Future Appointments         Provider Department Appt Notes    In 1 month Brenda Candelaria DO [facility] COPD, severe, mask policy advised    In 3 months Marco Lopez MD [facility]

## 2024-04-05 NOTE — TELEPHONE ENCOUNTER
Patient calling ( identified name and  ) states he has COPD and has  presently has  an intermittent cough , Patient states this cough syrup really helps with the cough     Patient requesting for a   \" larger bottle \" if possible      Allergies reviewed and pharmacy confirmed    Medication has no protocol, med pended for your review/ approval for 50ml size       Please advise and thank you.

## 2024-04-08 ENCOUNTER — TELEPHONE (OUTPATIENT)
Dept: INTERNAL MEDICINE CLINIC | Facility: CLINIC | Age: 74
End: 2024-04-08

## 2024-04-08 DIAGNOSIS — J44.9 COPD, SEVERE (HCC): Primary | ICD-10-CM

## 2024-04-08 RX ORDER — CODEINE PHOSPHATE AND GUAIFENESIN 10; 100 MG/5ML; MG/5ML
5 SOLUTION ORAL EVERY 6 HOURS PRN
Qty: 120 ML | Refills: 0 | Status: SHIPPED | OUTPATIENT
Start: 2024-04-08

## 2024-04-08 NOTE — TELEPHONE ENCOUNTER
Patient advised of 's notes and verbalized understanding.    He has an appointment with the pulmonologist 5/30/24.    Yes, He has been using the Trellegy inhaler.     Patient thanked  for filling the cough medication.

## 2024-04-08 NOTE — TELEPHONE ENCOUNTER
Patient called (identified name and ),   Asking for refill of cough medicine with codeine.  Last filled 2024.  States uses it for cough and mild wheezing at bedtime.  He has scripts for Trelegy and albuterol.  Not forthcoming with details of his symptoms.  States \"why are you so stingy with cough medication?\"  Advised it is controlled substance.  He declines office visit.  Wanted message sent to Dr Clifford (\"tell him I'm his pain in the ass.  I want a big bottle.\")  Dr Clifford, please advise?  There is a pended order in the system for him under a refill encounter.

## 2024-04-11 RX ORDER — CODEINE PHOSPHATE AND GUAIFENESIN 10; 100 MG/5ML; MG/5ML
5 SOLUTION ORAL EVERY 6 HOURS PRN
Qty: 50 ML | Refills: 0 | OUTPATIENT
Start: 2024-04-11

## 2024-04-15 ENCOUNTER — NURSE TRIAGE (OUTPATIENT)
Dept: INTERNAL MEDICINE CLINIC | Facility: CLINIC | Age: 74
End: 2024-04-15

## 2024-04-29 NOTE — TELEPHONE ENCOUNTER
Pharmacy requesting refill of       famotidine 20 MG Oral Tab, Take 1 tablet (20 mg total) by mouth 2 (two) times daily., Disp: 180 tablet, Rfl: 3

## 2024-04-30 RX ORDER — FAMOTIDINE 20 MG/1
20 TABLET, FILM COATED ORAL 2 TIMES DAILY
Qty: 180 TABLET | Refills: 3 | Status: SHIPPED | OUTPATIENT
Start: 2024-04-30

## 2024-04-30 NOTE — TELEPHONE ENCOUNTER
Refill passed per [facility] protocols.    Requested Prescriptions   Pending Prescriptions Disp Refills    famotidine 20 MG Oral Tab 180 tablet 3     Sig: Take 1 tablet (20 mg total) by mouth 2 (two) times daily.       Gastrointestional Medication Protocol Passed - 4/30/2024  8:34 AM        Passed - In person appointment or virtual visit in the past 12 mos or appointment in next 3 mos     Recent Outpatient Visits              2 months ago COPD, severe (HCC)    [facility] Car Clifford MD    Office Visit    7 months ago Community acquired pneumonia, unspecified laterality    [facility] Car Clifford MD    Office Visit    8 months ago Acute cough    [facility] Car Clifford MD    Office Visit    11 months ago Severe persistent asthma with acute exacerbation ([facility]    [facility] Visit    1 year ago Chronic obstructive pulmonary disease with acute exacerbation ([facility])    [facility] Akilah Mac APRN    Office Visit          Future Appointments         Provider Department Appt Notes    In 1 month Brenda Candelaria DO [facility] COPD, severe, mask policy advised    In 2 months Marco Lopez MD [facility]

## 2024-05-18 ENCOUNTER — TELEPHONE (OUTPATIENT)
Dept: INTERNAL MEDICINE CLINIC | Facility: CLINIC | Age: 74
End: 2024-05-18

## 2024-05-18 NOTE — TELEPHONE ENCOUNTER
Pharmacy requesting alternative medication    Current Outpatient Medications   Medication Sig Dispense Refill    WIXELA INHUB DISKUS 500/50MCG 60S Inhale 1 puff into the lungs twice daily 60      Drug not covered by pt plan. Preferred alternative is ADVAIRHFA, BREOELLIPTA, DULERA

## 2024-05-20 NOTE — TELEPHONE ENCOUNTER
Dr. Linchangco - Wixela not covered by insurance plan    Are any of the below appropriate alternatives for patient?    Drug not covered by pt plan. Preferred alternative is ADVAIRHFA, BREOELLIPTA, DULERA

## 2024-05-21 NOTE — TELEPHONE ENCOUNTER
Called [facility] pharmacy, spoke with Edwina.  Verified Patient's name and date of birth.  Verified that Patient is on Trelegy only, and was recently dispensed.  No further action needed.

## 2024-06-07 ENCOUNTER — TELEPHONE (OUTPATIENT)
Dept: INTERNAL MEDICINE CLINIC | Facility: CLINIC | Age: 74
End: 2024-06-07

## 2024-06-07 RX ORDER — FINASTERIDE 5 MG/1
5 TABLET, FILM COATED ORAL DAILY
Qty: 90 TABLET | Refills: 3 | Status: SHIPPED | OUTPATIENT
Start: 2024-06-07

## 2024-06-07 RX ORDER — METOPROLOL TARTRATE 50 MG/1
50 TABLET, FILM COATED ORAL 2 TIMES DAILY
Qty: 180 TABLET | Refills: 1 | Status: SHIPPED | OUTPATIENT
Start: 2024-06-07

## 2024-06-07 RX ORDER — FUROSEMIDE 40 MG/1
40 TABLET ORAL DAILY
Qty: 90 TABLET | Refills: 1 | Status: SHIPPED | OUTPATIENT
Start: 2024-06-07

## 2024-06-07 NOTE — TELEPHONE ENCOUNTER
Patient called (identified name and ),   Went to [facility].  Was told to see a doctor for Lymphedema in both of his legs.  Gave him number to call Lymphedema clinic at [phone] for options.  Advised him to let us know if he needs a referral.  He stated understanding.

## 2024-06-07 NOTE — TELEPHONE ENCOUNTER
Please review. Protocol Failed; No Protocol    Requested Prescriptions   Pending Prescriptions Disp Refills    FUROSEMIDE 40 MG Oral Tab [Pharmacy Med Name: FUROSEMIDE 40MG TABLETS] 90 tablet 1     Sig: TAKE 1 TABLET(40 MG) BY MOUTH DAILY       Hypertension Medications Protocol Failed - 6/4/2024  5:48 AM        Failed - EGFRCR or GFRNAA > 50     GFR Evaluation  EGFRCR: 44 , resulted on 8/17/2023          Passed - CMP or BMP in past 12 months        Passed - Last BP reading less than 140/90     BP Readings from Last 1 Encounters:   02/12/24 115/70               Passed - In person appointment or virtual visit in the past 12 mos or appointment in next 3 mos     Recent Outpatient Visits              3 months ago COPD, severe ([facility])    [facility] Car Clifford MD    Office Visit    9 months ago Community acquired pneumonia, unspecified laterality    [facility] Car Clifford MD    Office Visit    9 months ago Acute cough    [facility] Car Clifford MD    Office Visit    1 year ago Severe persistent asthma with acute exacerbation ([facility])    [facility] Car Clifford MD    Office Visit    1 year ago Chronic obstructive pulmonary disease with acute exacerbation ([facility])    [facility] Akilah Mac APRN    Office Visit          Future Appointments         Provider Department Appt Notes    In 1 month Marco Lopez MD [facility]     In 3 months Brenda Candelaria,  [facility] COPD, severe, mask policy advised                      METOPROLOL TARTRATE 50 MG Oral Tab [Pharmacy Med Name: METOPROLOL TARTRATE 50MG TABLETS] 180 tablet 1     Sig: TAKE 1 TABLET(50 MG) BY MOUTH TWICE DAILY       Hypertension  Medications Protocol Failed - 6/4/2024  5:48 AM        Failed - EGFRCR or GFRNAA > 50     GFR Evaluation  EGFRCR: 44 , resulted on 8/17/2023          Passed - CMP or BMP in past 12 months        Passed - Last BP reading less than 140/90     BP Readings from Last 1 Encounters:   02/12/24 115/70               Passed - In person appointment or virtual visit in the past 12 mos or appointment in next 3 mos     Recent Outpatient Visits              3 months ago COPD, severe ([facility])    [facility] Car Clifford MD    Office Visit    9 months ago Community acquired pneumonia, unspecified laterality    [facility] Car Clifford MD    Office Visit    9 months ago [facility] cough    [facility]    1 year ago Severe persistent asthma with acute exacerbation ([facility])    [facility] Car Clifford MD    Office Visit    1 year ago Chronic obstructive pulmonary disease with acute exacerbation ([facility]    [facility]          Future Appointments         Provider Department Appt Notes    In 1 month Marco Lpoez MD [facility]     In 3 months Brenda Candelaria,  [facility] COPD, severe, mask policy advised                     Signed Prescriptions Disp Refills    FINASTERIDE 5 MG Oral Tab 90 tablet 3     Sig: TAKE 1 TABLET(5 MG) BY MOUTH DAILY       Genitourinary Medications Passed - 6/4/2024  5:48 AM        Passed - Patient does not have pulmonary hypertension on problem list        Passed - In person appointment or virtual visit in the past 12 mos or appointment in next 3 mos     Recent Outpatient Visits              3 months ago COPD, severe (HCC)     [facility], [facility] Car Cardona MD    Office Visit    9 months ago Community acquired pneumonia, unspecified laterality    [facility] Car Cardona MD    Office Visit    9 months ago Acute cough    [facility], Car Cardona MD    Office Visit    1 year ago Severe persistent asthma with acute exacerbation (HCC)    [facility] Car Cardona MD    Office Visit    1 year ago Chronic obstructive pulmonary disease with acute exacerbation (HCC)    [facility], Akilah Bryan APRN    Office Visit          Future Appointments         Provider Department Appt Notes    In 1 month Marco Lopez MD [facility]     In 3 months Brenda Candelaria DO [facility], [facility], [place] COPD, severe, mask policy advised                           Future Appointments         Provider Department Appt Notes    In 1 month Marco Lopez MD [facility]     In 3 months Brenda Candelaria DO [facility], [facility], [place] COPD, severe, mask policy advised          Recent Outpatient Visits              3 months ago COPD, severe (HCC)    [facility], Car Cardona MD    Office Visit    9 months ago Community acquired pneumonia, unspecified laterality    [facility] Car Cardona MD    Office Visit    9 months ago Acute cough    [facility] Car Cardona MD    Office Visit    1 year ago Severe persistent asthma with acute exacerbation (HCC)    [facility] Car Cardona MD    Office Visit    1 year  ago Chronic obstructive pulmonary disease with acute exacerbation (HCC)    [facility], [facility], Akilah Bryan, [facility]    Office Visit

## 2024-06-10 NOTE — TELEPHONE ENCOUNTER
Verified name and  of patient.    Patient and friend of patient on the phone.    Patient states they called the Lymphedema clinic and that they were of no assistance without order or referral.    He states he was seen in the vein clinic in the past and that \"they were not helpful\".    He states that he has had ongoing right lower extremity swelling for over a year- from right foot up to his thigh. He states that skin is red and flaky.    They are requesting that message be sent to Dr. Clifford [facility].

## 2024-06-10 NOTE — TELEPHONE ENCOUNTER
Patient calling with other person on line, Vanesa who is with him. Patient advised I could talk with Vanesa.  Patient is wanting to know who to go to for Lymphedema - patient saw Vein Clinic and they made recommendation to see lymphedema clinic provider. I advised Vanesa and patient that they should call vein clinic back to get recommendations on providers to see -    Advised no \"referral\" needed and patient needs to check to see if provider recommendation he gets, is in network for him and they take medicare.     Vanesa states understanding, she is helping patient with this. She will assist patient in calling Vein clinic to get provider recommendations of who to follow with.     No other questions or concerns at this time.

## 2024-06-11 ENCOUNTER — MED REC SCAN ONLY (OUTPATIENT)
Dept: INTERNAL MEDICINE CLINIC | Facility: CLINIC | Age: 74
End: 2024-06-11

## 2024-07-02 ENCOUNTER — NURSE TRIAGE (OUTPATIENT)
Dept: INTERNAL MEDICINE CLINIC | Facility: CLINIC | Age: 74
End: 2024-07-02

## 2024-07-02 DIAGNOSIS — K59.00 CONSTIPATION, UNSPECIFIED CONSTIPATION TYPE: ICD-10-CM

## 2024-07-02 DIAGNOSIS — Z12.11 ENCOUNTER FOR SCREENING COLONOSCOPY FOR NON-HIGH-RISK PATIENT: Primary | ICD-10-CM

## 2024-07-02 NOTE — TELEPHONE ENCOUNTER
Relayed Dr message to patient -he wrote down medication name but want to call back tomorrow for GI referral info

## 2024-07-02 NOTE — TELEPHONE ENCOUNTER
He can take otc colace 100mg on tab daily to bid for consttpation;   I dont think he had colonoscopy before so should see gastro for colonscopy; we can refer to Dr Hinton and partners

## 2024-07-02 NOTE — TELEPHONE ENCOUNTER
Action Requested: Summary for Provider     []  Critical Lab, Recommendations Needed  [x] Need Additional Advice  []   FYI    []   Need Orders  [] Need Medications Sent to Pharmacy  []  Other     SUMMARY: Patient stated that he had not had a bowel movement for the past 2 days. No abdominal pain. No bloating or abdominal distention. No fevers. No other symptoms. Advised patient to make sure drinking plenty of fluids, increase fiber in diet, walking. Patient indicated that he has Senna at home that is  2023. Advised patient that can contact pharmacist to verify effectiveness of medication since . Otherwise can try taking over the counter laxative or miralax daily. Patient agreed, but also wanted message sent to doctor to see if he could prescribe a stronger laxative.    Reason for call: Constipation  Onset: 2 days    Reason for Disposition   MILD constipation    Protocols used: Constipation-A-[place]

## 2024-07-03 NOTE — TELEPHONE ENCOUNTER
Patient did not call back today - postponing for Friday. If no call back and if patient has not read [employer] Message,  please contact patient to provide Gastroenterology information.

## 2024-07-05 NOTE — TELEPHONE ENCOUNTER
Patient contacted and notified.  Nurse advised increased hydration and high fiber diet.  He was not able to take down GI referral information and requests a copy be mailed to him.  Routed to on site staff to print and mail referral.  Thank you.

## 2024-07-16 NOTE — TELEPHONE ENCOUNTER
Please review; protocol failed/ has no protocol    Requested Prescriptions   Pending Prescriptions Disp Refills    TRELEGY ELLIPTA 200-62.5-25 MCG/ACT Inhalation Aerosol Powder, Breath Activated [Pharmacy Med Name: TRELEGY ELLIPTA 200-62.5MCG INH 30P] 180 each 0     Sig: Inhale 1 puff into the lungs daily.       Asthma & COPD Medication Protocol Failed - 7/12/2024 12:45 PM        Failed - Asthma Action Score greater than or equal to 20        Failed - AAP/ACT given in last 12 months     No data recorded  No data recorded  No data recorded  No data recorded          Passed - Appointment in past 6 or next 3 months      Recent Outpatient Visits              5 months ago COPD, severe ([facility])    [facility] Car Clifford MD    Office Visit    10 months ago Community acquired pneumonia, unspecified laterality    [facility] Car Clifford MD    Office Visit    10 months ago Acute cough    [facility] Car Clifford MD    Office Visit    1 year ago Severe persistent asthma with acute exacerbation ([facility])    [facility] Car Clifford MD    Office Visit    1 year ago Chronic obstructive pulmonary disease with acute exacerbation ([facility])    [facility] Akilah Mac APRN    Office Visit          Future Appointments         Provider Department Appt Notes    In 1 month Car Clifford MD [facility]     In 2 months Brenda Candelaria,  [facility], [facility] COPD, severe, mask policy advised                       Recent Outpatient Visits              5 months ago COPD, severe ([facility])    [facility] Car Clifford MD    Office Visit    10 months ago Community acquired pneumonia,  unspecified laterality    [facility] Car Clifford MD    Office Visit    10 months ago Acute cough    [facility] Car Clifford MD    Office Visit    1 year ago Severe persistent asthma with acute exacerbation (HCC)    [facility] Car Clifford MD    Office Visit    1 year ago Chronic obstructive pulmonary disease with acute exacerbation (HCC)    [facility] Akilah Mac APRN    Office Visit          Future Appointments         Provider Department Appt Notes    In 1 month Car Clifford MD [facility]     In 2 months Brenda Candelaria,  [facility], [facility] COPD, severe, mask policy advised

## 2024-07-17 RX ORDER — FLUTICASONE FUROATE, UMECLIDINIUM BROMIDE AND VILANTEROL TRIFENATATE 200; 62.5; 25 UG/1; UG/1; UG/1
1 POWDER RESPIRATORY (INHALATION) DAILY
Qty: 180 EACH | Refills: 1 | Status: SHIPPED | OUTPATIENT
Start: 2024-07-17

## 2024-07-23 NOTE — TELEPHONE ENCOUNTER
Please review; protocol failed/ has no protocol    Requested Prescriptions   Pending Prescriptions Disp Refills    ALBUTEROL 108 (90 Base) MCG/ACT Inhalation Aero Soln [Pharmacy Med Name: ALBUTEROL HFA INH (200 PUFFS) 8.5GM] 25.5 g 0     Sig: INHALE 2 PUFFS INTO THE LUNGS EVERY 6 HOURS AS NEEDED FOR WHEEZING OR SHORTNESS OF BREATH       Asthma & COPD Medication Protocol Failed - 7/19/2024  2:20 PM        Failed - Asthma Action Score greater than or equal to 20        Failed - AAP/ACT given in last 12 months     No data recorded  No data recorded  No data recorded  No data recorded          Passed - Appointment in past 6 or next 3 months      Recent Outpatient Visits              5 months ago COPD, severe ([facility])    [facility] Car Clifford MD    Office Visit    10 months ago Community acquired pneumonia, unspecified laterality    [facility] Car Clifford MD    Office Visit    10 months ago Acute cough    [facility] Car Clifford MD    Office Visit    1 year ago Severe persistent asthma with acute exacerbation ([facility])    [facility] Car Clifford MD    Office Visit    1 year ago Chronic obstructive pulmonary disease with acute exacerbation ([facility])    [facility] Akilah Mac APRN    Office Visit          Future Appointments         Provider Department Appt Notes    In 3 weeks Car Clifford MD [facility]     In 2 months Brenda Candelaria,  [place], [facility] COPD, severe, mask policy advised                       Recent Outpatient Visits              5 months ago COPD, severe (HCC)    [facility] Car Clifford MD    Office Visit    10 months ago Community  acquired pneumonia, unspecified laterality    [facility] Car Clifford MD    Office Visit    10 months ago Acute cough    [facility] Car Clifford MD    Office Visit    1 year ago Severe persistent asthma with acute exacerbation (HCC)    [facility] Car Clifford MD    Office Visit    1 year ago Chronic obstructive pulmonary disease with acute exacerbation ([facility])    [facility] Akilah Mac APRN    Office Visit          Future Appointments         Provider Department Appt Notes    In 3 weeks Car Clifford MD [facility]     In 2 months Brenda Candelaria,  [facility], [facility] COPD, severe, mask policy advised

## 2024-07-24 RX ORDER — ALBUTEROL SULFATE 90 UG/1
2 AEROSOL, METERED RESPIRATORY (INHALATION) EVERY 6 HOURS PRN
Qty: 25.5 G | Refills: 0 | Status: SHIPPED | OUTPATIENT
Start: 2024-07-24

## 2024-08-23 ENCOUNTER — TELEPHONE (OUTPATIENT)
Dept: INTERNAL MEDICINE CLINIC | Facility: CLINIC | Age: 74
End: 2024-08-23

## 2024-08-23 DIAGNOSIS — I89.0 LYMPHEDEMA OF BOTH LOWER EXTREMITIES: Primary | ICD-10-CM

## 2024-08-23 DIAGNOSIS — S81.809A MULTIPLE OPENS WOUND OF LOWER EXTREMITY, UNSPECIFIED LATERALITY, INITIAL ENCOUNTER: ICD-10-CM

## 2024-08-23 NOTE — TELEPHONE ENCOUNTER
Patient sister called and said that patient  was suppose to have an infectious disease referral but I saw no referral on file.     Patient is requesting referral.     Name of specialist and specialty department : Infectious Disease   Reason for visit with the specialist: Legs have sore   Address of the specialist office: n/a  Appointment date: n/a         CSS informed patient the turnaround time for referral is 5-7 business days.  Patient was informed to check their [employer] account for referral status.

## 2024-08-26 ENCOUNTER — TELEPHONE (OUTPATIENT)
Dept: PHYSICAL THERAPY | Facility: HOSPITAL | Age: 74
End: 2024-08-26

## 2024-08-26 NOTE — TELEPHONE ENCOUNTER
Wound care referral pended.  Please review, edit & sign if appropriate.    Patient has lymphedema of bilateral legs, not sure if needs wound care or infectious disease referral     Called patient's sister Lula to clarify confirmed has bad wound on his bilateral legs due to lymphedema.  Informed referral to Lymphedema clinic in chart from 6/2024, provided number to schedule:[phone].     Informed I would request a wound care referral incase needed.

## 2024-08-29 RX ORDER — APIXABAN 2.5 MG/1
2.5 TABLET, FILM COATED ORAL 2 TIMES DAILY
Qty: 180 TABLET | Refills: 0 | OUTPATIENT
Start: 2024-08-29

## 2024-08-30 ENCOUNTER — TELEPHONE (OUTPATIENT)
Dept: HEMATOLOGY/ONCOLOGY | Facility: HOSPITAL | Age: 74
End: 2024-08-30

## 2024-08-30 NOTE — TELEPHONE ENCOUNTER
I called the pt to schedule him an appt with Dr. Mendoza or Dr. Paz bc he hasn't been seen since 2022.    Pt said he will have to call back and asked about the refill of Eliquis    I advised the pt in order for the doctor to refill his prescription they would like for him to schedule an office visit     Pt said okay he will call back

## 2024-08-30 NOTE — TELEPHONE ENCOUNTER
Patient and patient's friend called to schedule appointment. Patient is scheduled with Dr. Paz for 9/6/24. Called 8/30/24 KM

## 2024-09-04 RX ORDER — AMLODIPINE BESYLATE 5 MG/1
5 TABLET ORAL DAILY
Qty: 90 TABLET | Refills: 1 | Status: SHIPPED | OUTPATIENT
Start: 2024-09-04

## 2024-09-04 NOTE — TELEPHONE ENCOUNTER
Please review.  Protocol failed / Has no protocol.     Requested Prescriptions   Pending Prescriptions Disp Refills    AMLODIPINE 5 MG Oral Tab [Pharmacy Med Name: AMLODIPINE BESYLATE 5MG TABLETS] 90 tablet 3     Sig: TAKE 1 TABLET(5 MG) BY MOUTH DAILY       Hypertension Medications Protocol Failed - 9/2/2024  5:50 AM        Failed - CMP or BMP in past 12 months        Failed - EGFRCR or GFRNAA > 50     GFR Evaluation            Passed - Last BP reading less than 140/90     BP Readings from Last 1 Encounters:   02/12/24 115/70               Passed - In person appointment or virtual visit in the past 12 mos or appointment in next 3 mos     Recent Outpatient Visits              6 months ago COPD, severe ([facility])    [facility] Car Clifford MD    Office Visit    1 year ago [facility], [facility]    [facility]    1 year ago Acute cough    [facility] Car Clifford MD    Office Visit    1 year ago Severe persistent asthma with acute exacerbation ([facility])    [facility] Car Clifford MD    Office Visit    1 year ago Chronic obstructive pulmonary disease with acute exacerbation ([facility])    [facility] Akilah Mac APRN    Office Visit          Future Appointments         Provider Department Appt Notes    In 2 weeks Thalia Paz MD [facility] Hematology Oncology Former Dr. Patricio pt    In 2 weeks Cr Diaz MD [facility] W[facility] Care Clinic patient not sure if he has wounds - referred from [facility] - seems confused    In 3 weeks Brenda Candelaria DO [facility] COPD, severe, mask policy advised                       Future Appointments         Provider  Department Appt Notes    In 2 weeks Thalia Paz MD [facility] Hematology Oncology Former Dr. Patricio pt    In 2 weeks Cr Diaz MD [facility] Wound Care Clinic patient not sure if he has wounds - referred from [facility] - seems confused    In 3 weeks Brenda Candelaria,  [facility] COPD, severe, mask policy advised          Recent Outpatient Visits              6 months ago COPD, severe (HCC)    [facility] Car Clifford MD    Office Visit    1 year ago Community acquired pneumonia, unspecified laterality    [facility] Car Clifford MD    Office Visit    1 year ago Acute cough    [facility] Car Cardona MD    Office Visit    1 year ago Severe persistent asthma with acute exacerbation ([facility]    [facility]    1 year ago Chronic obstructive pulmonary disease with acute exacerbation ([facility])    [facility] Akilah Mac APRN    Office Visit

## 2024-09-06 ENCOUNTER — APPOINTMENT (OUTPATIENT)
Dept: HEMATOLOGY/ONCOLOGY | Facility: HOSPITAL | Age: 74
End: 2024-09-06
Attending: STUDENT IN AN ORGANIZED HEALTH CARE EDUCATION/TRAINING PROGRAM
Payer: MEDICARE

## 2024-09-19 ENCOUNTER — TELEPHONE (OUTPATIENT)
Dept: HEMATOLOGY/ONCOLOGY | Facility: HOSPITAL | Age: 74
End: 2024-09-19

## 2024-09-19 NOTE — TELEPHONE ENCOUNTER
Patient called due in for follow up for Eliquis management.  He had to reschedule apt due to conflict with another apt.  Rescheduled to 9/27 at 100 pm.  He is currently taking Eliquis 2.5 mg.  No complaints.    Question is if any labs needed prior to apt?  I told him I would call him back with instructions.  Please advise.

## 2024-09-19 NOTE — TELEPHONE ENCOUNTER
Called patient with that no labs prior to apt are needed at this time.  He verbalizes understanding.

## 2024-09-20 ENCOUNTER — OFFICE VISIT (OUTPATIENT)
Dept: WOUND CARE | Facility: HOSPITAL | Age: 74
End: 2024-09-20
Attending: FAMILY MEDICINE
Payer: MEDICARE

## 2024-09-20 ENCOUNTER — APPOINTMENT (OUTPATIENT)
Dept: HEMATOLOGY/ONCOLOGY | Facility: HOSPITAL | Age: 74
End: 2024-09-20
Attending: STUDENT IN AN ORGANIZED HEALTH CARE EDUCATION/TRAINING PROGRAM
Payer: MEDICARE

## 2024-09-20 VITALS
DIASTOLIC BLOOD PRESSURE: 86 MMHG | TEMPERATURE: 98 F | SYSTOLIC BLOOD PRESSURE: 155 MMHG | HEART RATE: 74 BPM | HEIGHT: 66 IN | OXYGEN SATURATION: 94 % | BODY MASS INDEX: 32.14 KG/M2 | WEIGHT: 200 LBS | RESPIRATION RATE: 18 BRPM

## 2024-09-20 DIAGNOSIS — I89.0 LYMPHEDEMA OF BOTH LOWER EXTREMITIES: ICD-10-CM

## 2024-09-20 DIAGNOSIS — S81.809A MULTIPLE OPENS WOUND OF LOWER EXTREMITY, UNSPECIFIED LATERALITY, INITIAL ENCOUNTER: Primary | ICD-10-CM

## 2024-09-20 PROCEDURE — 99204 OFFICE O/P NEW MOD 45 MIN: CPT | Performed by: FAMILY MEDICINE

## 2024-09-20 NOTE — PROGRESS NOTES
Weekly Wound Education Note    Teaching Provided To: Patient;Family (wife and mother)  Training Topics: Edema control;Cleasing and general instructions;Compression;Signs and symptoms of infection;Skin care  Training Method: Demonstration;Explain/Verbal  Training Response: Patient responds and understands        Notes: Bilateral legs- no open wounds. Non bordered Aquacel foam used for fragile skin over left leg. Patient educated on compression therapy and assistive devices to help don compression stockings. Also recommendation for urea cream 40% for scaly skin.

## 2024-09-23 NOTE — PROGRESS NOTES
Chief Complaint   Patient presents with    Wound Care     Bilateral legs        HPI:     74-year-old new patient here for evaluation of both lower extremities where he had some open wounds due to spontaneous blisters ruptured and also has thick scaly skin.  He would like to get my advice on how he should move forward with the management of this.  Past medical history significant for type 2 diabetes, high blood pressure, obesity with BMI 32, lymphedema both lower extremities.    Lab Results   Component Value Date    BUN 19 (H) 08/17/2023    CREATSERUM 1.64 (H) 08/17/2023    ALB 3.38 (L) 08/30/2023    TP 7.7 08/30/2023    A1C 6.9 (A) 02/12/2024         Current Outpatient Medications:     amLODIPine 5 MG Oral Tab, Take 1 tablet (5 mg total) by mouth daily., Disp: 90 tablet, Rfl: 1    albuterol 108 (90 Base) MCG/ACT Inhalation Aero Soln, Inhale 2 puffs into the lungs every 6 (six) hours as needed for Wheezing or Shortness of Breath., Disp: 25.5 g, Rfl: 0    fluticasone-umeclidin-vilant (TRELEGY ELLIPTA) 200-62.5-25 MCG/ACT Inhalation Aerosol Powder, Breath Activated, Inhale 1 puff into the lungs daily., Disp: 180 each, Rfl: 1    furosemide 40 MG Oral Tab, Take 1 tablet (40 mg total) by mouth daily., Disp: 90 tablet, Rfl: 1    metoprolol tartrate 50 MG Oral Tab, Take 1 tablet (50 mg total) by mouth 2 (two) times daily., Disp: 180 tablet, Rfl: 1    FINASTERIDE 5 MG Oral Tab, TAKE 1 TABLET(5 MG) BY MOUTH DAILY, Disp: 90 tablet, Rfl: 3    famotidine 20 MG Oral Tab, Take 1 tablet (20 mg total) by mouth 2 (two) times daily., Disp: 180 tablet, Rfl: 3    guaiFENesin-codeine (CHERATUSSIN AC) 100-10 MG/5ML Oral Solution, Take 5 mL by mouth every 6 (six) hours as needed for cough., Disp: 120 mL, Rfl: 0    apixaban (ELIQUIS) 2.5 MG Oral Tab, Take 1 tablet (2.5 mg total) by mouth 2 (two) times daily., Disp: 60 tablet, Rfl: 0    GUAIFENESIN-CODEINE 100-10 MG/5ML Oral Solution, TAKE 5 ML BY MOUTH EVERY 6 HOURS AS NEEDED FOR COUGH,  Disp: 50 mL, Rfl: 0    atorvastatin 20 MG Oral Tab, Take 1 tablet (20 mg total) by mouth nightly., Disp: 90 tablet, Rfl: 1    Spacer/Aero-Holding Chambers Does not apply Device, Use with albuterol inhaler, Disp: 1 each, Rfl: 0    benzonatate (TESSALON PERLES) 100 MG Oral Cap, Take 1 capsule (100 mg total) by mouth 3 (three) times daily as needed for cough., Disp: 30 capsule, Rfl: 0    sennosides (GOODSENSE SENNA LAXATIVE) 8.6 MG Oral Tab, Take 1 tablet (8.6 mg total) by mouth 2 (two) times daily., Disp: 180 tablet, Rfl: 0    MULTIPLE VITAMINS-MINERALS OR, 1 daily, Disp: , Rfl:     Ciclopirox 8 % External Solution, Apply 1 Application topically nightly. To nails, Disp: 100 mL, Rfl: 5    amoxicillin clavulanate 875-125 MG Oral Tab, Take 1 tablet by mouth 2 (two) times daily. (Patient not taking: Reported on 9/20/2024), Disp: 14 tablet, Rfl: 0    Allergies   Allergen Reactions    Ace Inhibitors Coughing            REVIEW OF SYSTEMS:     Review of Systems (ROS)  This information was obtained from the patient, chart    A comprehensive 10 point review of systems was completed.  Pertinent positives and negatives noted in the the HPI.     Past medical, surgical, family and social history updated where appropriate.    PHYSICAL EXAM:   /86   Pulse 74   Temp 98.3 °F (36.8 °C)   Resp 18   Ht 66\"   Wt 200 lb   SpO2 94%   BMI 32.28 kg/m²    Estimated body mass index is 32.28 kg/m² as calculated from the following:    Height as of this encounter: 66\".    Weight as of this encounter: 200 lb.   Vital signs reviewed.Appears stated age, well groomed.      Awake, alert, in no acute distress  HENT:  normocephalic, atraumatic, external ear canals clear bilaterally, tympanic membranes appear opaque, non-bulging, non-erythematous, nasal turbinates are non-inflamed and not swollen, oropharynx without erythema, swelling, or exudate, gingiva and lips without any apparent lesions.   Cardiac:  S1 S2 regular rate and rhythm, no  murmur, gallop, or rub  Respiratory:  Bilaterally clear to auscultation, no chest tenderness to palpation, no wheezing, no rhonchi, no rales, air entry is adequate, no accessory respiratory muscle use, no chest asymmetry, normal excursion.  GI:  bowel sound positive in all four quadrants, abdomen is soft, non-tender, non-distended, no hepatosplenomegaly, no abnormal aortic pulsation.     Calf  Point of Measurement - Left Calf: 30  Point of Measurement - Right Calf: 30  Left Calf from:: Heel  Calf Left cm:: 38.2  Right Calf from:: Heel  Right Calf cm:: 43.4    Ankle  Point of Measurement - Left Ankle: 10  Point of Measurement - Right Ankle: 10  Left Ankle from:: Heel  Left Ankle cm:: 26.8     Right Ankle from:: Heel  Right Ankle cm:: 37.7       Foot  Point of Measurement - Left Foot: 10  Left Foot from:: Great toe  Left Foot cm:: 26.7  Left Heel to Knee: 39  Point of Measurement - Right Foot: 10  Right Foot from:: Great toe  Right Foot cm:: 38.7  Right Heel to Knee: 39      Wound 09/20/24 1 Leg Left;Lower (Active)   Date First Assessed/Time First Assessed: 09/20/24 1441    Wound Number (Wound Clinic Only): 1  Primary Wound Type: Lymphedema  Location: Leg  Wound Location Orientation: Left;Lower      Assessments 9/20/2024  2:51 PM   Wound Image       Site Assessment Dry;Edema;Epithelialization;Intact   Closure Approximated   Drainage Amount None   Treatments Cleansed;Compression Sleeve;Topical (Barrier/Moisturizer/Ointment)   Dressing Aquacel Foam (for fragile skin; medigrip F)   Dressing Changed New   Dressing Status Dry;Intact;Clean   Wound Length (cm) 0 cm   Wound Width (cm) 0 cm   Wound Surface Area (cm^2) 0 cm^2   Wound Depth (cm) 0 cm   Wound Volume (cm^3) 0 cm^3   Margins Flat and Intact   Non-staged Wound Description Not applicable   Aleksandra-wound Assessment Hemosiderin staining;Hyperpigmented;Edema;Dry   Wound Bed Epithelium (%) 100 %   State of Healing Epithelialized   Wound Odor None       No associated orders.        Wound 09/20/24 2 Leg Right;Lower (Active)   Date First Assessed/Time First Assessed: 09/20/24 1441    Wound Number (Wound Clinic Only): 2  Primary Wound Type: Lymphedema  Location: Leg  Wound Location Orientation: Right;Lower      Assessments 9/20/2024  2:51 PM   Wound Image       Site Assessment Dry;Intact;Epithelialization;Edema   Closure Approximated   Drainage Amount None   Treatments Cleansed;Compression Sleeve;Topical (Barrier/Moisturizer/Ointment)   Dressing Open to air (medigrip F)   Wound Length (cm) 0 cm   Wound Width (cm) 0 cm   Wound Surface Area (cm^2) 0 cm^2   Wound Depth (cm) 0 cm   Wound Volume (cm^3) 0 cm^3   Margins Flat and Intact   Non-staged Wound Description Not applicable   Aleksandra-wound Assessment Dry;Edema;Hemosiderin staining;Hyperpigmented   Wound Bed Granulation (%) 0 %   Wound Bed Epithelium (%) 100 %   Wound Bed Slough (%) 0 %   Wound Bed Eschar (%) 0 %   State of Healing Epithelialized   Wound Odor None       No associated orders.              ASSESSMENT AND PLAN:      1. Lymphedema of both lower extremities    2. Multiple opens wound of lower extremity, unspecified laterality, initial encounter    He does have some thick scaly skin areas on the legs and feet.  Will use urea 40% cream for this to help.  The wounds are essentially healed at this time.  No  wound dressings needed.  Will discharge from wound clinic at this time.  Good control leg swelling also discussed and compression recommended.  I had a long discussion with family members and patient regarding his lower extremities and further management.  He does have compression stocking 20 to 30 mmHg and should wear them on a regular basis on both legs.  He was wearing a 10-20 the left leg and a 20-30 on the right leg.  We did discuss using a Solomon as well which can be helpful for him.  Risks, benefits, and alternatives of current treatment plan discussed in detail.  Questions and concerns addressed. Red flags to RTC or ED  reviewed.  Patient (or parent) agrees to plan.      Return for Discharge from Wound Clinic.    Also refer to patient instructions.     I spent 50 minutes with the patient. This time included:  preparing to see the patient (eg, review notes and recent diagnostics), seeing the patient, performing a medically appropriate examination and/or evaluation, counseling and educating the patient, and documenting in the record.    I agree with staff documentation except for any changes made in my note.       Cr Diaz M.D., Wound Care Physician  [facility]  9/23/2024

## 2024-10-03 RX ORDER — FLUTICASONE PROPIONATE AND SALMETEROL 500; 50 UG/1; UG/1
1 POWDER RESPIRATORY (INHALATION) 2 TIMES DAILY
Qty: 60 EACH | Refills: 0 | OUTPATIENT
Start: 2024-10-03

## 2024-10-04 ENCOUNTER — APPOINTMENT (OUTPATIENT)
Dept: HEMATOLOGY/ONCOLOGY | Facility: HOSPITAL | Age: 74
End: 2024-10-04
Attending: STUDENT IN AN ORGANIZED HEALTH CARE EDUCATION/TRAINING PROGRAM
Payer: MEDICARE

## 2024-10-10 ENCOUNTER — TELEPHONE (OUTPATIENT)
Dept: HEMATOLOGY/ONCOLOGY | Facility: HOSPITAL | Age: 74
End: 2024-10-10

## 2024-10-10 NOTE — TELEPHONE ENCOUNTER
Edwin called and said he will be seeing Dr Mendoza next Monday at 2 pm. He wanted to know what labs he needs to have drawn before his visit. I explained that Dr Mendoza will order labs once he has seen him on 10/14/2024. He verbalized understanding. No other questions or concerns at this time.

## 2024-10-11 ENCOUNTER — APPOINTMENT (OUTPATIENT)
Dept: HEMATOLOGY/ONCOLOGY | Facility: HOSPITAL | Age: 74
End: 2024-10-11
Payer: MEDICARE

## 2024-10-14 ENCOUNTER — OFFICE VISIT (OUTPATIENT)
Dept: HEMATOLOGY/ONCOLOGY | Facility: HOSPITAL | Age: 74
End: 2024-10-14
Attending: INTERNAL MEDICINE
Payer: MEDICARE

## 2024-10-14 VITALS
HEIGHT: 66 IN | WEIGHT: 213.31 LBS | TEMPERATURE: 98 F | BODY MASS INDEX: 34.28 KG/M2 | RESPIRATION RATE: 18 BRPM | SYSTOLIC BLOOD PRESSURE: 178 MMHG | OXYGEN SATURATION: 93 % | HEART RATE: 75 BPM | DIASTOLIC BLOOD PRESSURE: 91 MMHG

## 2024-10-14 DIAGNOSIS — Z79.01 ANTICOAGULATION MANAGEMENT ENCOUNTER: ICD-10-CM

## 2024-10-14 DIAGNOSIS — Z51.81 ANTICOAGULATION MANAGEMENT ENCOUNTER: ICD-10-CM

## 2024-10-14 DIAGNOSIS — I82.401 DEEP VEIN THROMBOSIS (DVT) OF RIGHT LOWER EXTREMITY, UNSPECIFIED CHRONICITY, UNSPECIFIED VEIN (HCC): Primary | ICD-10-CM

## 2024-10-14 DIAGNOSIS — I87.2 STASIS DERMATITIS OF BOTH LEGS: ICD-10-CM

## 2024-10-14 DIAGNOSIS — I87.009 POST-THROMBOTIC SYNDROME: ICD-10-CM

## 2024-10-14 PROCEDURE — 99211 OFF/OP EST MAY X REQ PHY/QHP: CPT

## 2024-10-14 NOTE — PROGRESS NOTES
[facility] Progress Note     Patient Name: Edwin Crum   YOB: 1950   Medical Record Number: [id number]   Attending Physician: Gerardo Mendoza MD     Diagnosis  1. Deep vein thrombosis (DVT) of right lower extremity, unspecified chronicity, unspecified vein (HCC)    2. Anticoagulation management encounter    3. Post-thrombotic syndrome    4. Stasis dermatitis of both legs      Interval History  Patient returns for follow-up. Was previously under the care of Dr. Abdelrahman Patricio. Since the last visit he has been doing okay.  Has chronic stasis dermatitis and seeing wound care.  Has intermittent leg swelling.  Amlodipine was thought to be exacerbating this so he is off it and is due to discuss alternative antihypertensive therapy with his PCP.  Continues on Eliquis no bleeding issues are described. Patient denies any nausea, vomiting, fevers, chills or pain. Pt also denies any bleeding, specifically hematuria, hematemesis or hemoptysis.  Uses a walker to ambulate    History of Present Illness:  74 year old male being evaluated by Hematology for a right lower extremity DVT.  Per patient report, this occurred after a fall and hip fracture in the spring of 2019.  He has been on chronic anticoagulation with apixaban.  He also has issues with post thrombotic syndrome and chronic venous stasis.  He has seen Vascular Surgery.     Performance Status:  ECOG2  Past Medical History:  Past Medical History:    Acute, but ill-defined, cerebrovascular disease    BPH (benign prostatic hyperplasia)    Diabetes (HCC)    Encounter for follow-up of acute deep vein thrombosis (DVT) of right lower extremity    Hip fracture, right (HCC)    2019    Hyperlipidemia    Moderate persistent asthma with acute exacerbation (HCC)    Osteoarthritis    Scoliosis    Unspecified essential hypertension       Past Surgical History:  Past Surgical History:   Procedure Laterality Date    Electrocardiogram, complete  11/19/2013    SCANNED TO  MEDIA TAB - 11/19/2013    Other surgical history      right hip pinning for hip fracture    Tonsillectomy         Family History:  Family History   Problem Relation Age of Onset    Heart Disease Father     Cancer Father     Hypertension Father     Dementia Mother         Alzheimer's Disease    Other (Scoliosis) Sister     Other (Scoliosis) Brother     Diabetes Paternal Grandfather        Social History:  Social History     Socioeconomic History    Marital status:    Tobacco Use    Smoking status: Never     Passive exposure: Never    Smokeless tobacco: Never   Vaping Use    Vaping status: Never Used   Substance and Sexual Activity    Alcohol use: Yes     Alcohol/week: 0.0 standard drinks of alcohol     Comment: occ     Drug use: No   Other Topics Concern    Caffeine Concern Yes     Comment: tea, 2 cups per day     Exercise Yes     Comment: walking         Current Medications:    Current Outpatient Medications:     amLODIPine 5 MG Oral Tab, Take 1 tablet (5 mg total) by mouth daily., Disp: 90 tablet, Rfl: 1    albuterol 108 (90 Base) MCG/ACT Inhalation Aero Soln, Inhale 2 puffs into the lungs every 6 (six) hours as needed for Wheezing or Shortness of Breath., Disp: 25.5 g, Rfl: 0    fluticasone-umeclidin-vilant (TRELEGY ELLIPTA) 200-62.5-25 MCG/ACT Inhalation Aerosol Powder, Breath Activated, Inhale 1 puff into the lungs daily., Disp: 180 each, Rfl: 1    furosemide 40 MG Oral Tab, Take 1 tablet (40 mg total) by mouth daily., Disp: 90 tablet, Rfl: 1    metoprolol tartrate 50 MG Oral Tab, Take 1 tablet (50 mg total) by mouth 2 (two) times daily., Disp: 180 tablet, Rfl: 1    FINASTERIDE 5 MG Oral Tab, TAKE 1 TABLET(5 MG) BY MOUTH DAILY, Disp: 90 tablet, Rfl: 3    famotidine 20 MG Oral Tab, Take 1 tablet (20 mg total) by mouth 2 (two) times daily., Disp: 180 tablet, Rfl: 3    guaiFENesin-codeine (CHERATUSSIN AC) 100-10 MG/5ML Oral Solution, Take 5 mL by mouth every 6 (six) hours as needed for cough., Disp: 120 mL,  Rfl: 0    apixaban (ELIQUIS) 2.5 MG Oral Tab, Take 1 tablet (2.5 mg total) by mouth 2 (two) times daily., Disp: 60 tablet, Rfl: 0    GUAIFENESIN-CODEINE 100-10 MG/5ML Oral Solution, TAKE 5 ML BY MOUTH EVERY 6 HOURS AS NEEDED FOR COUGH, Disp: 50 mL, Rfl: 0    atorvastatin 20 MG Oral Tab, Take 1 tablet (20 mg total) by mouth nightly., Disp: 90 tablet, Rfl: 1    Spacer/Aero-Holding Chambers Does not apply Device, Use with albuterol inhaler, Disp: 1 each, Rfl: 0    benzonatate (TESSALON PERLES) 100 MG Oral Cap, Take 1 capsule (100 mg total) by mouth 3 (three) times daily as needed for cough., Disp: 30 capsule, Rfl: 0    amoxicillin clavulanate 875-125 MG Oral Tab, Take 1 tablet by mouth 2 (two) times daily. (Patient not taking: Reported on 9/20/2024), Disp: 14 tablet, Rfl: 0    sennosides (GOODSENSE SENNA LAXATIVE) 8.6 MG Oral Tab, Take 1 tablet (8.6 mg total) by mouth 2 (two) times daily., Disp: 180 tablet, Rfl: 0    MULTIPLE VITAMINS-MINERALS OR, 1 daily, Disp: , Rfl:     Ciclopirox 8 % External Solution, Apply 1 Application topically nightly. To nails, Disp: 100 mL, Rfl: 5    Current Outpatient Medications on File Prior to Visit   Medication Sig Dispense Refill    amLODIPine 5 MG Oral Tab Take 1 tablet (5 mg total) by mouth daily. 90 tablet 1    albuterol 108 (90 Base) MCG/ACT Inhalation Aero Soln Inhale 2 puffs into the lungs every 6 (six) hours as needed for Wheezing or Shortness of Breath. 25.5 g 0    fluticasone-umeclidin-vilant (TRELEGY ELLIPTA) 200-62.5-25 MCG/ACT Inhalation Aerosol Powder, Breath Activated Inhale 1 puff into the lungs daily. 180 each 1    furosemide 40 MG Oral Tab Take 1 tablet (40 mg total) by mouth daily. 90 tablet 1    metoprolol tartrate 50 MG Oral Tab Take 1 tablet (50 mg total) by mouth 2 (two) times daily. 180 tablet 1    FINASTERIDE 5 MG Oral Tab TAKE 1 TABLET(5 MG) BY MOUTH DAILY 90 tablet 3    famotidine 20 MG Oral Tab Take 1 tablet (20 mg total) by mouth 2 (two) times daily. 180  tablet 3    guaiFENesin-codeine (CHERATUSSIN AC) 100-10 MG/5ML Oral Solution Take 5 mL by mouth every 6 (six) hours as needed for cough. 120 mL 0    apixaban (ELIQUIS) 2.5 MG Oral Tab Take 1 tablet (2.5 mg total) by mouth 2 (two) times daily. 60 tablet 0    GUAIFENESIN-CODEINE 100-10 MG/5ML Oral Solution TAKE 5 ML BY MOUTH EVERY 6 HOURS AS NEEDED FOR COUGH 50 mL 0    atorvastatin 20 MG Oral Tab Take 1 tablet (20 mg total) by mouth nightly. 90 tablet 1    Spacer/Aero-Holding Chambers Does not apply Device Use with albuterol inhaler 1 each 0    benzonatate (TESSALON PERLES) 100 MG Oral Cap Take 1 capsule (100 mg total) by mouth 3 (three) times daily as needed for cough. 30 capsule 0    amoxicillin clavulanate 875-125 MG Oral Tab Take 1 tablet by mouth 2 (two) times daily. (Patient not taking: Reported on 9/20/2024) 14 tablet 0    sennosides (GOODSENSE SENNA LAXATIVE) 8.6 MG Oral Tab Take 1 tablet (8.6 mg total) by mouth 2 (two) times daily. 180 tablet 0    MULTIPLE VITAMINS-MINERALS OR 1 daily      Ciclopirox 8 % External Solution Apply 1 Application topically nightly. To nails 100 mL 5     No current facility-administered medications on file prior to visit.         Allergies:  Allergies   Allergen Reactions    Ace Inhibitors Coughing        Review of Systems:  All other systems reviewed and negative x12    Vital Signs:  There were no vitals taken for this visit.    Physical Examination:  General: Patient is alert and oriented x 3, not in acute distress.  Psych:  Mood and affect appropriate  HEENT: EOMs intact. PERRL. Oropharynx is clear.   Neck: No JVD. No palpable lymphadenopathy. Neck is supple.  Lymphatics: There is no palpable peripheral lymphadenopathy   Chest: Symmetric expansion, nonlabored breathing  Cardiovascular: Regular with palpable distal pulses   Abdomen: Soft, non tender.   Extremities: As edema secondary stasis dermatitis.  Uses a walker to ambulate  Neurological: 5/5 motor x4.      Laboratory:  WBC:  6, done on 8/30/2023.  HGB: 12.6, done on 8/30/2023.  PLT: 192, done on 8/30/2023.           Lab Results   Component Value Date    GLU 98 08/17/2023    BUN 19 (H) 08/17/2023    BUNCREA 11.2 04/14/2022    CREATSERUM 1.64 (H) 08/17/2023    ANIONGAP 4 08/17/2023    GFRNAA 58 (L) 04/14/2022    GFRAA 67 04/14/2022    CA 8.4 (L) 08/17/2023    OSMOCALC 282 08/17/2023    ALKPHO 95 08/17/2023    AST 13 (L) 08/17/2023    ALT 15 (L) 08/17/2023    BILT 0.7 08/17/2023    TP 7.7 08/30/2023    ALB 3.38 (L) 08/30/2023    GLOBULIN 4.9 (H) 08/17/2023     (L) 08/17/2023    K 4.4 08/17/2023     08/17/2023    CO2 28.0 08/17/2023       Radiology:    Impression and Plan:  74 year old Right lower extremity DVT as noted above diagnosed 2019.  He has post thrombotic syndrome chronic venous stasis.  - Given high risk for recurrent DVT we have recommended continuing a preventative dose of apixaban 2.5 mg twice daily.   - Return to clinic in 1 year  -Continue compression therapy and follow-up with wound care for his chronic venous insufficiency/post thrombotic syndrome and lower extremity swelling.        Thank you Dr Car Clifford MD for the opportunity to participate in the care of this interesting patient. Please do contact me if I may be of any further assistance    Gerardo Mendoza MD  [facility]

## 2024-10-15 NOTE — TELEPHONE ENCOUNTER
Please review; protocol failed/No Protocol    Last Office Visit: 02/12/2024    Requested Prescriptions   Pending Prescriptions Disp Refills    ALBUTEROL 108 (90 Base) MCG/ACT Inhalation Aero Soln [Pharmacy Med Name: ALBUTEROL HFA INH (200 PUFFS) 8.5GM] 25.5 g 0     Sig: INHALE 2 PUFFS INTO THE LUNGS EVERY 6 HOURS AS NEEDED FOR WHEEZING OR SHORTNESS OF BREATH       Asthma & COPD Medication Protocol Failed - 10/15/2024  3:14 PM        Failed - Asthma Action Score greater than or equal to 20        Failed - Appointment in past 6 or next 3 months      Recent Outpatient Visits              Yesterday Deep vein thrombosis (DVT) of right lower extremity, unspecified chronicity, unspecified vein (HCC)    [facility] Gerardo Mendoza MD    Office Visit    3 weeks ago Multiple opens wound of lower extremity, unspecified laterality, initial encounter    [facility] rC Diaz MD    Office Visit    8 months ago COPD, severe (HCC)    [facility] Car Cardona MD    Office Visit    1 year ago Community acquired pneumonia, unspecified laterality    [facility]    Office Visit    1 year ago Acute cough    [facility] Car Cardona MD    Office Visit                      Failed - AAP/ACT given in last 12 months     No data recorded  No data recorded  No data recorded  No data recorded               Recent Outpatient Visits              Yesterday Deep vein thrombosis (DVT) of right lower extremity, unspecified chronicity, unspecified vein (HCC)    [facility] Gerardo Mendoza MD    Office Visit    3 weeks ago Multiple opens wound of lower extremity, unspecified laterality, initial encounter    [facility] Cr Diaz MD    Office Visit    8 months ago COPD, severe (HCC)     [facility] [address], Car Cardona MD    Office Visit    1 year ago Community acquired pneumonia, unspecified laterality    [facility] [address], Car Cardona MD    Office Visit    1 year ago Acute cough    [facility], [address], Car Cardona MD    Office Visit

## 2024-10-16 RX ORDER — ALBUTEROL SULFATE 90 UG/1
2 INHALANT RESPIRATORY (INHALATION) EVERY 6 HOURS PRN
Qty: 25.5 G | Refills: 0 | Status: SHIPPED | OUTPATIENT
Start: 2024-10-16

## 2024-10-18 ENCOUNTER — TELEPHONE (OUTPATIENT)
Dept: INTERNAL MEDICINE CLINIC | Facility: CLINIC | Age: 74
End: 2024-10-18

## 2024-10-18 NOTE — TELEPHONE ENCOUNTER
Verified name and .    Patient states that he saw hematologist/oncologist on 10/14/24- was advised to discontinue Amlodipine due to lower extremity edema.  He was advised to reach out to PCP to ask for alternative medication to replace the Amlodipine.    Patient states he is taking Metoprolol 50 mg and Furosemide 40 mg as prescribed.    Please advise and thank you.    Patient was advised to monitor blood pressures at home and log blood pressure readings. Patient verbalizes understanding and agrees with plan.

## 2024-10-21 NOTE — TELEPHONE ENCOUNTER
Spoke with the patient,verified full name and     Stated he stop the Amlodipine already see message below    He will need to call us back to schedule appointment    Asking which other blood pressure medication is he on advised  Metoprolo.    No further questions

## 2024-10-26 DIAGNOSIS — J44.9 COPD, SEVERE (HCC): ICD-10-CM

## 2024-10-28 RX ORDER — CODEINE PHOSPHATE AND GUAIFENESIN 10; 100 MG/5ML; MG/5ML
5 SOLUTION ORAL EVERY 6 HOURS PRN
Qty: 120 ML | Refills: 0 | OUTPATIENT
Start: 2024-10-28

## 2024-10-28 NOTE — TELEPHONE ENCOUNTER
Verified name and .    Patient reports he has a cough- that he is taking Coricidin with no relief.    Patient able to speak full sentences with no issues- no audible dyspnea heard on call.    Patient states he is requesting refill for guaifenesin-codeine.    He was advised that appointment is required for evaluation prior to prescriptions being sent to pharmacy.    Patient states that he will back to schedule an appointment.

## 2024-11-08 ENCOUNTER — HOSPITAL ENCOUNTER (OUTPATIENT)
Age: 74
Discharge: HOME | End: 2024-11-08
Payer: MEDICARE

## 2024-11-08 ENCOUNTER — APPOINTMENT (OUTPATIENT)
Dept: GENERAL RADIOLOGY | Age: 74
End: 2024-11-08
Attending: PHYSICIAN ASSISTANT
Payer: MEDICARE

## 2024-11-08 VITALS
SYSTOLIC BLOOD PRESSURE: 178 MMHG | OXYGEN SATURATION: 95 % | WEIGHT: 200 LBS | DIASTOLIC BLOOD PRESSURE: 91 MMHG | HEIGHT: 66 IN | RESPIRATION RATE: 22 BRPM | HEART RATE: 76 BPM | TEMPERATURE: 98 F | BODY MASS INDEX: 32.14 KG/M2

## 2024-11-08 DIAGNOSIS — J06.9 VIRAL URI WITH COUGH: Primary | ICD-10-CM

## 2024-11-08 PROCEDURE — 99213 OFFICE O/P EST LOW 20 MIN: CPT | Performed by: PHYSICIAN ASSISTANT

## 2024-11-08 PROCEDURE — 71046 X-RAY EXAM CHEST 2 VIEWS: CPT | Performed by: PHYSICIAN ASSISTANT

## 2024-11-08 NOTE — ED PROVIDER NOTES
Patient Seen in: [facility]      History     Chief Complaint   Patient presents with    Cough/URI     Stated Complaint: chest pain; cough    Subjective:   HPI      74-year-old male with history as listed below presents to the immediate care for evaluation of cough, congestion.  Patient states symptoms have been ongoing for the last 7 to 10 days.  There is no associated fever/chills, chest pain or shortness of breath.  There is mild associated nasal congestion.  Patient states his cough is productive of brown phlegm.  He has been taking Mucinex for the last week which has given mild relief of his symptoms.    Objective:     Past Medical History:    Acute, but ill-defined, cerebrovascular disease    BPH (benign prostatic hyperplasia)    Diabetes ([facility])    Encounter for follow-up of acute deep vein thrombosis (DVT) of right lower extremity    Hip fracture, right ([facility])    2019    Hyperlipidemia    Moderate persistent asthma with acute exacerbation ([facility])    Osteoarthritis    Scoliosis    Unspecified essential hypertension              Past Surgical History:   Procedure Laterality Date    Electrocardiogram, complete  11/19/2013    SCANNED TO MEDIA TAB - 11/19/2013    Other surgical history      right hip pinning for hip fracture    Tonsillectomy                  Social History     Socioeconomic History    Marital status:    Tobacco Use    Smoking status: Never     Passive exposure: Never    Smokeless tobacco: Never   Vaping Use    Vaping status: Never Used   Substance and Sexual Activity    Alcohol use: Yes     Alcohol/week: 0.0 standard drinks of alcohol     Comment: occ     Drug use: No   Other Topics Concern    Caffeine Concern Yes     Comment: tea, 2 cups per day     Exercise Yes     Comment: walking   Social History Narrative    The patient does not use an assistive device..      The patient does not live in a home with stairs.                                                 Review of  Systems    Positive for stated complaint: chest pain; cough  Other systems are as noted in HPI.  Constitutional and vital signs reviewed.      All other systems reviewed and negative except as noted above.    Physical Exam     ED Triage Vitals [11/08/24 1653]   BP (!) 178/91   Pulse 76   Resp 22   Temp 98 °F (36.7 °C)   Temp src Temporal   SpO2 95 %   O2 Device None (Room air)       Current Vitals:   Vital Signs  BP: (!) 178/91  Pulse: 76  Resp: 22  Temp: 98 °F (36.7 °C)  Temp src: Temporal    Oxygen Therapy  SpO2: 95 %  O2 Device: None (Room air)        Physical Exam  Vitals and nursing note reviewed.   Constitutional:       General: He is not in acute distress.  HENT:      Head: Normocephalic and atraumatic.      Right Ear: External ear normal.      Left Ear: External ear normal.      Nose: Nose normal.      Mouth/Throat:      Mouth: Mucous membranes are moist.   Eyes:      Extraocular Movements: Extraocular movements intact.      Pupils: Pupils are equal, round, and reactive to light.   Cardiovascular:      Rate and Rhythm: Normal rate.   Pulmonary:      Effort: Pulmonary effort is normal.   Abdominal:      General: Abdomen is flat.   Musculoskeletal:         General: Normal range of motion.      Cervical back: Normal range of motion.   Skin:     General: Skin is warm.   Neurological:      General: No focal deficit present.      Mental Status: He is alert and oriented to person, place, and time.   Psychiatric:         Mood and Affect: Mood normal.         Behavior: Behavior normal.             ED Course   Labs Reviewed - No data to display     74-year-old male presenting from home for evaluation of cough, congestion for the last 7 to 10 days.  Patient is afebrile in the immediate care.  Oxygen saturation 90% on room air, heart rate 76    Ddx-viral URI, bronchitis, pneumonia, pleural effusion    Cxr negative for infiltrate or abnormality.  Patient has had success in the past with guaifenesin codeine syrup.  He is  requesting the same.       [facility]              Medical Decision Making      Disposition and Plan     Clinical Impression:  1. Viral URI with cough         Disposition:  Discharge  11/8/2024  5:32 pm    Follow-up:  Car Clifford MD  [address] [place], [place] [id number]  [phone]      go to if your symptoms do not improve          Medications Prescribed:  Current Discharge Medication List        START taking these medications    Details   guaiFENesin-Codeine 100-10 MG/5ML Oral Syrup Take 5 mL by mouth every 4 (four) hours as needed.  Qty: 180 mL, Refills: 0    Associated Diagnoses: Viral URI with cough                 Supplementary Documentation:

## 2024-11-08 NOTE — ED INITIAL ASSESSMENT (HPI)
Pt presents to the IC with c/o a cough and chest congestion. Phlegm is brown. Pt has been using mucinex for the last week without much improvement. No fevers. Mild nasal congestion.

## 2024-11-22 ENCOUNTER — OFFICE VISIT (OUTPATIENT)
Dept: INTERNAL MEDICINE CLINIC | Facility: CLINIC | Age: 74
End: 2024-11-22

## 2024-11-22 VITALS
WEIGHT: 209.19 LBS | SYSTOLIC BLOOD PRESSURE: 138 MMHG | HEART RATE: 83 BPM | DIASTOLIC BLOOD PRESSURE: 80 MMHG | BODY MASS INDEX: 34 KG/M2

## 2024-11-22 DIAGNOSIS — I10 ESSENTIAL HYPERTENSION: ICD-10-CM

## 2024-11-22 DIAGNOSIS — L97.929 CHRONIC VENOUS HYPERTENSION (IDIOPATHIC) WITH ULCER OF LEFT LOWER EXTREMITY (HCC): ICD-10-CM

## 2024-11-22 DIAGNOSIS — N40.0 BENIGN PROSTATIC HYPERPLASIA WITHOUT LOWER URINARY TRACT SYMPTOMS: ICD-10-CM

## 2024-11-22 DIAGNOSIS — R53.83 OTHER FATIGUE: ICD-10-CM

## 2024-11-22 DIAGNOSIS — I89.0 LYMPHEDEMA OF BOTH LOWER EXTREMITIES: ICD-10-CM

## 2024-11-22 DIAGNOSIS — J44.9 COPD, SEVERE (HCC): ICD-10-CM

## 2024-11-22 DIAGNOSIS — I87.312 CHRONIC VENOUS HYPERTENSION (IDIOPATHIC) WITH ULCER OF LEFT LOWER EXTREMITY (HCC): ICD-10-CM

## 2024-11-22 DIAGNOSIS — Z86.718 HISTORY OF DVT OF LOWER EXTREMITY: ICD-10-CM

## 2024-11-22 DIAGNOSIS — E11.9 CONTROLLED TYPE 2 DIABETES MELLITUS WITHOUT COMPLICATION, WITHOUT LONG-TERM CURRENT USE OF INSULIN (HCC): Primary | ICD-10-CM

## 2024-11-22 DIAGNOSIS — E78.00 PURE HYPERCHOLESTEROLEMIA: ICD-10-CM

## 2024-11-22 LAB — HEMOGLOBIN A1C: 6.1 % (ref 4.3–5.6)

## 2024-11-22 PROCEDURE — 99214 OFFICE O/P EST MOD 30 MIN: CPT | Performed by: INTERNAL MEDICINE

## 2024-11-22 PROCEDURE — 83036 HEMOGLOBIN GLYCOSYLATED A1C: CPT | Performed by: INTERNAL MEDICINE

## 2024-11-22 RX ORDER — LOSARTAN POTASSIUM 25 MG/1
25 TABLET ORAL DAILY
Qty: 90 TABLET | Refills: 1 | Status: SHIPPED | OUTPATIENT
Start: 2024-11-22

## 2024-11-24 PROBLEM — L97.929 CHRONIC VENOUS HYPERTENSION (IDIOPATHIC) WITH ULCER OF LEFT LOWER EXTREMITY (HCC): Status: ACTIVE | Noted: 2024-11-24

## 2024-11-24 PROBLEM — I87.312 CHRONIC VENOUS HYPERTENSION (IDIOPATHIC) WITH ULCER OF LEFT LOWER EXTREMITY (HCC): Status: ACTIVE | Noted: 2024-11-24

## 2024-11-24 NOTE — PROGRESS NOTES
Subjective:     Patient ID: Edwin Crum is a 74 year old male.    Diabetes  He presents for his follow-up diabetic visit. He has type 2 diabetes mellitus. His disease course has been stable. There are no hypoglycemic associated symptoms. There are no diabetic associated symptoms. Pertinent negatives for diabetes include no chest pain. There are no hypoglycemic complications. Diabetic complications include a CVA and heart disease. Pertinent negatives for diabetic complications include no peripheral neuropathy, PVD or retinopathy. Risk factors for coronary artery disease include diabetes mellitus, dyslipidemia, hypertension, male sex, obesity and sedentary lifestyle. Current diabetic treatment includes diet. He is compliant with treatment most of the time. He rarely participates in exercise. Home blood sugar record trend: pt doesnt check glucose. An ACE inhibitor/angiotensin II receptor blocker is being taken. He does not see a podiatrist.Eye exam is not current.   COPD  This is a chronic (has severe copd) problem. The current episode started more than 1 year ago. The problem occurs intermittently. The problem has been waxing and waning. Pertinent negatives include no chest pain. Treatments tried: on trelegy and prn PURNIMA inhaler. The treatment provided mild relief.   Hypertension  This is a chronic problem. The current episode started more than 1 year ago. The problem has been waxing and waning since onset. Associated symptoms include peripheral edema and shortness of breath. Pertinent negatives include no chest pain. There are no associated agents to hypertension. Risk factors for coronary artery disease include diabetes mellitus, male gender, sedentary lifestyle, obesity and dyslipidemia. Past treatments include diuretics, beta blockers and calcium channel blockers. The current treatment provides significant improvement. Compliance problems include psychosocial issues.  Hypertensive end-organ damage includes CVA  and heart failure. There is no history of angina, kidney disease, CAD/MI, PVD or retinopathy. There is no history of chronic renal disease, a hypertension causing med or a thyroid problem.   Hyperlipidemia  This is a chronic problem. The current episode started more than 1 year ago. The problem is controlled. Recent lipid tests were reviewed and are normal. Exacerbating diseases include diabetes and obesity. He has no history of chronic renal disease, hypothyroidism, liver disease or nephrotic syndrome. Factors aggravating his hyperlipidemia include thiazides and beta blockers. Associated symptoms include shortness of breath. Pertinent negatives include no chest pain. Current antihyperlipidemic treatment includes statins, diet change and exercise. The current treatment provides significant improvement of lipids. Compliance problems include adherence to exercise and psychosocial issues.  Risk factors for coronary artery disease include diabetes mellitus, dyslipidemia, hypertension, male sex, obesity and a sedentary lifestyle.       History/Other:   Review of Systems   Respiratory:  Positive for shortness of breath.    Cardiovascular:  Negative for chest pain.     Current Outpatient Medications   Medication Sig Dispense Refill    losartan 25 MG Oral Tab Take 1 tablet (25 mg total) by mouth daily. 90 tablet 1    albuterol 108 (90 Base) MCG/ACT Inhalation Aero Soln Inhale 2 puffs into the lungs every 6 (six) hours as needed for Wheezing or Shortness of Breath. 25.5 g 0    apixaban (ELIQUIS) 2.5 MG Oral Tab Take 1 tablet (2.5 mg total) by mouth 2 (two) times daily. 180 tablet 3    fluticasone-umeclidin-vilant (TRELEGY ELLIPTA) 200-62.5-25 MCG/ACT Inhalation Aerosol Powder, Breath Activated Inhale 1 puff into the lungs daily. 180 each 1    furosemide 40 MG Oral Tab Take 1 tablet (40 mg total) by mouth daily. 90 tablet 1    metoprolol tartrate 50 MG Oral Tab Take 1 tablet (50 mg total) by mouth 2 (two) times daily. 180  tablet 1    FINASTERIDE 5 MG Oral Tab TAKE 1 TABLET(5 MG) BY MOUTH DAILY 90 tablet 3    famotidine 20 MG Oral Tab Take 1 tablet (20 mg total) by mouth 2 (two) times daily. 180 tablet 3    atorvastatin 20 MG Oral Tab Take 1 tablet (20 mg total) by mouth nightly. 90 tablet 1    Spacer/Aero-Holding Chambers Does not apply Device Use with albuterol inhaler 1 each 0    sennosides (GOODSENSE SENNA LAXATIVE) 8.6 MG Oral Tab Take 1 tablet (8.6 mg total) by mouth 2 (two) times daily. 180 tablet 0    MULTIPLE VITAMINS-MINERALS OR 1 daily      Ciclopirox 8 % External Solution Apply 1 Application topically nightly. To nails 100 mL 5    benzonatate (TESSALON PERLES) 100 MG Oral Cap Take 1 capsule (100 mg total) by mouth 3 (three) times daily as needed for cough. (Patient not taking: Reported on 11/22/2024) 30 capsule 0     Allergies:Allergies[1]    Past Medical History:    Acute, but ill-defined, cerebrovascular disease    BPH (benign prostatic hyperplasia)    Diabetes ([facility]    Encounter for follow-up of acute deep vein thrombosis (DVT) of right lower extremity    Hip fracture, right ([facility])    2019    Hyperlipidemia    Moderate persistent asthma with acute exacerbation ([facility])    Osteoarthritis    Scoliosis    Unspecified essential hypertension      Past Surgical History:   Procedure Laterality Date    Electrocardiogram, complete  11/19/2013    SCANNED TO MEDIA TAB - 11/19/2013    Other surgical history      right hip pinning for hip fracture    Tonsillectomy        Family History   Problem Relation Age of Onset    Heart Disease Father     Cancer Father     Hypertension Father     Dementia Mother         Alzheimer's Disease    Other (Scoliosis) Sister     Other (Scoliosis) Brother     Diabetes Paternal Grandfather       Social History:   Social History     Socioeconomic History    Marital status:    Tobacco Use    Smoking status: Never     Passive exposure: Never    Smokeless tobacco: Never   Vaping Use    Vaping status:  Never Used   Substance and Sexual Activity    Alcohol use: Yes     Alcohol/week: 0.0 standard drinks of alcohol     Comment: occ     Drug use: No   Other Topics Concern    Caffeine Concern Yes     Comment: tea, 2 cups per day     Exercise Yes     Comment: walking   Social History Narrative    The patient does not use an assistive device..      The patient does not live in a home with stairs.                                           Objective:   Physical Exam  Constitutional:       General: He is not in acute distress.     Appearance: He is obese. He is not ill-appearing, toxic-appearing or diaphoretic.      Comments: Uses walker to ambulate   HENT:      Right Ear: External ear normal.      Left Ear: External ear normal.   Cardiovascular:      Rate and Rhythm: Normal rate and regular rhythm.      Heart sounds: Normal heart sounds. No murmur heard.     No gallop.   Pulmonary:      Effort: Pulmonary effort is normal. No respiratory distress.      Breath sounds: Normal breath sounds. No wheezing or rales.   Abdominal:      General: Bowel sounds are normal. There is no distension.      Palpations: Abdomen is soft. There is no mass.      Tenderness: There is no abdominal tenderness. There is no guarding or rebound.   Musculoskeletal:      Cervical back: Normal range of motion and neck supple. No rigidity.      Right lower leg: Edema present.      Left lower leg: Edema present.   Lymphadenopathy:      Cervical: No cervical adenopathy.   Skin:     Coloration: Skin is not jaundiced or pale.      Findings: Rash present.      Comments: Chronic stasis changes on both legs    Neurological:      Mental Status: He is alert.         Assessment & Plan:   (E11.9) Controlled type 2 diabetes mellitus without complication, without long-term current use of insulin ([facility])  (primary encounter diagnosis)  Plan: POC Glycohemoglobin [32924], Microalb/Creat         Ratio, Random Urine        Recheck his A1c was 6.1 so his diabetes is  controlled with just diet alone.  Will check his urine for microalbumin.  Patient also told to see ophthalmologist for his diabetic eye exam , patient also had been followed by podiatrist in the wound clinic.    (J44.9) COPD, severe (HCC)  Plan: Patient was just recently seen in urgent care and treated for acute bronchitis causing COPD exacerbation.  He is already feeling better.  He does have severe COPD and has been repeatedly told before that he should really be following up with pulmonologist and referral was again given.  He states that he continues to use his Trelegy and uses his albuterol inhaler as needed.  He is not needing O2 supplement at this time.    (N40.0) Benign prostatic hyperplasia without lower urinary tract symptoms  Plan: PSA - Diagnostic [E]        Will check his PSA.  He has been on finasteride.    (E78.00) Pure hypercholesterolemia  Plan: Comp Metabolic Panel (14), Lipid Panel        Check his lipid panel.  Continue with statin therapy.    (I10) Essential hypertension  Plan: CBC With Differential With Platelet        Is a vascular surgeon told the patient to stop his amlodipine and be switched over to another blood pressure medicine given his chronic venous insufficiency/hypertension.  I started him on losartan 25 mg daily and will have him follow-up with us in about 4 weeks.    (I89.0) Lymphedema of both lower extremities  Plan: See below.    (I87.312,  L97.929) Chronic venous hypertension (idiopathic) with ulcer of left lower extremity (HCC)  Plan: Patient has chronic history of chronic venous insufficiency/hypertension as well as chronic lymphedema.  He is currently seeing the wound clinic.  He has been also evaluated and followed by vascular surgeon.    (Z86.718) History of DVT of lower extremity  Plan: Remote history of DVT and was fully treated with Eliquis and now is on prophylactic dose of Eliquis as recommended by his hematologist to be done on a chronic basis.    (R53.83) Other  fatigue  Plan: TSH W Reflex To Free T4        Plaint of some fatigue however the patient is sedentary.  Also has severe COPD.  We did talk about possibly doing a sleep study to rule out sleep apnea but the patient declined.  Will do check labs.       Orders Placed This Encounter   Procedures    POC Glycohemoglobin [65649]    CBC With Differential With Platelet    Comp Metabolic Panel (14)    Lipid Panel    Microalb/Creat Ratio, Random Urine    TSH W Reflex To Free T4    PSA - Diagnostic [E]       Meds This Visit:  Requested Prescriptions     Signed Prescriptions Disp Refills    losartan 25 MG Oral Tab 90 tablet 1     Sig: Take 1 tablet (25 mg total) by mouth daily.       Imaging & Referrals:  None            [1]   Allergies  Allergen Reactions    Ace Inhibitors Coughing

## 2024-11-25 ENCOUNTER — LAB ENCOUNTER (OUTPATIENT)
Dept: LAB | Age: 74
End: 2024-11-25
Attending: INTERNAL MEDICINE
Payer: MEDICARE

## 2024-11-25 DIAGNOSIS — R53.83 OTHER FATIGUE: ICD-10-CM

## 2024-11-25 DIAGNOSIS — E78.00 PURE HYPERCHOLESTEROLEMIA: ICD-10-CM

## 2024-11-25 DIAGNOSIS — I10 ESSENTIAL HYPERTENSION: ICD-10-CM

## 2024-11-25 DIAGNOSIS — N40.0 BENIGN PROSTATIC HYPERPLASIA WITHOUT LOWER URINARY TRACT SYMPTOMS: ICD-10-CM

## 2024-11-25 DIAGNOSIS — E11.9 CONTROLLED TYPE 2 DIABETES MELLITUS WITHOUT COMPLICATION, WITHOUT LONG-TERM CURRENT USE OF INSULIN (HCC): ICD-10-CM

## 2024-11-25 LAB
ALBUMIN SERPL-MCNC: 4 G/DL (ref 3.2–4.8)
ALBUMIN/GLOB SERPL: 1 {RATIO} (ref 1–2)
ALP LIVER SERPL-CCNC: 102 U/L
ALT SERPL-CCNC: 13 U/L
ANION GAP SERPL CALC-SCNC: 6 MMOL/L (ref 0–18)
AST SERPL-CCNC: 20 U/L (ref ?–34)
BASOPHILS # BLD AUTO: 0.04 X10(3) UL (ref 0–0.2)
BASOPHILS NFR BLD AUTO: 0.6 %
BILIRUB SERPL-MCNC: 1 MG/DL (ref 0.2–1.1)
BUN BLD-MCNC: 14 MG/DL (ref 9–23)
BUN/CREAT SERPL: 11.2 (ref 10–20)
CALCIUM BLD-MCNC: 9.5 MG/DL (ref 8.7–10.4)
CHLORIDE SERPL-SCNC: 103 MMOL/L (ref 98–112)
CHOLEST SERPL-MCNC: 142 MG/DL (ref ?–200)
CO2 SERPL-SCNC: 30 MMOL/L (ref 21–32)
CREAT BLD-MCNC: 1.25 MG/DL
CREAT UR-SCNC: 36.3 MG/DL
DEPRECATED RDW RBC AUTO: 47.4 FL (ref 35.1–46.3)
EGFRCR SERPLBLD CKD-EPI 2021: 61 ML/MIN/1.73M2 (ref 60–?)
EOSINOPHIL # BLD AUTO: 0.4 X10(3) UL (ref 0–0.7)
EOSINOPHIL NFR BLD AUTO: 5.7 %
ERYTHROCYTE [DISTWIDTH] IN BLOOD BY AUTOMATED COUNT: 14 % (ref 11–15)
FASTING PATIENT LIPID ANSWER: NO
FASTING STATUS PATIENT QL REPORTED: NO
GLOBULIN PLAS-MCNC: 4 G/DL (ref 2–3.5)
GLUCOSE BLD-MCNC: 93 MG/DL (ref 70–99)
HCT VFR BLD AUTO: 39.5 %
HDLC SERPL-MCNC: 35 MG/DL (ref 40–59)
HGB BLD-MCNC: 13.1 G/DL
IMM GRANULOCYTES # BLD AUTO: 0.02 X10(3) UL (ref 0–1)
IMM GRANULOCYTES NFR BLD: 0.3 %
LDLC SERPL CALC-MCNC: 84 MG/DL (ref ?–100)
LYMPHOCYTES # BLD AUTO: 1.21 X10(3) UL (ref 1–4)
LYMPHOCYTES NFR BLD AUTO: 17.1 %
MCH RBC QN AUTO: 30.5 PG (ref 26–34)
MCHC RBC AUTO-ENTMCNC: 33.2 G/DL (ref 31–37)
MCV RBC AUTO: 92.1 FL
MICROALBUMIN UR-MCNC: 1.5 MG/DL
MICROALBUMIN/CREAT 24H UR-RTO: 41.3 UG/MG (ref ?–30)
MONOCYTES # BLD AUTO: 0.73 X10(3) UL (ref 0.1–1)
MONOCYTES NFR BLD AUTO: 10.3 %
NEUTROPHILS # BLD AUTO: 4.66 X10 (3) UL (ref 1.5–7.7)
NEUTROPHILS # BLD AUTO: 4.66 X10(3) UL (ref 1.5–7.7)
NEUTROPHILS NFR BLD AUTO: 66 %
NONHDLC SERPL-MCNC: 107 MG/DL (ref ?–130)
OSMOLALITY SERPL CALC.SUM OF ELEC: 288 MOSM/KG (ref 275–295)
PLATELET # BLD AUTO: 230 10(3)UL (ref 150–450)
POTASSIUM SERPL-SCNC: 4.5 MMOL/L (ref 3.5–5.1)
PROT SERPL-MCNC: 8 G/DL (ref 5.7–8.2)
PSA SERPL-MCNC: 0.38 NG/ML (ref ?–4)
RBC # BLD AUTO: 4.29 X10(6)UL
SODIUM SERPL-SCNC: 139 MMOL/L (ref 136–145)
T4 FREE SERPL-MCNC: 1.1 NG/DL (ref 0.8–1.7)
TRIGL SERPL-MCNC: 127 MG/DL (ref 30–149)
TSI SER-ACNC: 5.18 UIU/ML (ref 0.55–4.78)
VLDLC SERPL CALC-MCNC: 20 MG/DL (ref 0–30)
WBC # BLD AUTO: 7.1 X10(3) UL (ref 4–11)

## 2024-11-25 PROCEDURE — 82043 UR ALBUMIN QUANTITATIVE: CPT

## 2024-11-25 PROCEDURE — 80061 LIPID PANEL: CPT

## 2024-11-25 PROCEDURE — 80053 COMPREHEN METABOLIC PANEL: CPT

## 2024-11-25 PROCEDURE — 84443 ASSAY THYROID STIM HORMONE: CPT

## 2024-11-25 PROCEDURE — 84153 ASSAY OF PSA TOTAL: CPT

## 2024-11-25 PROCEDURE — 82570 ASSAY OF URINE CREATININE: CPT

## 2024-11-25 PROCEDURE — 36415 COLL VENOUS BLD VENIPUNCTURE: CPT

## 2024-11-25 PROCEDURE — 85025 COMPLETE CBC W/AUTO DIFF WBC: CPT

## 2024-11-25 PROCEDURE — 84439 ASSAY OF FREE THYROXINE: CPT

## 2024-12-03 RX ORDER — FUROSEMIDE 40 MG/1
40 TABLET ORAL DAILY
Qty: 90 TABLET | Refills: 3 | Status: SHIPPED | OUTPATIENT
Start: 2024-12-03

## 2024-12-03 RX ORDER — METOPROLOL TARTRATE 50 MG
50 TABLET ORAL 2 TIMES DAILY
Qty: 180 TABLET | Refills: 3 | Status: SHIPPED | OUTPATIENT
Start: 2024-12-03

## 2024-12-03 NOTE — TELEPHONE ENCOUNTER
Refill passed per [facility] protocol.  Requested Prescriptions   Pending Prescriptions Disp Refills    FUROSEMIDE 40 MG Oral Tab [Pharmacy Med Name: FUROSEMIDE 40MG TABLETS] 90 tablet 1     Sig: TAKE 1 TABLET BY MOUTH DAILY       Hypertension Medications Protocol Passed - 12/3/2024 11:09 AM        Passed - CMP or BMP in past 12 months        Passed - Last BP reading less than 140/90     BP Readings from Last 1 Encounters:   11/22/24 138/80               Passed - In person appointment or virtual visit in the past 12 mos or appointment in next 3 mos     Recent Outpatient Visits              1 week ago Controlled type 2 diabetes mellitus without complication, without long-term current use of insulin ([facility])    [facility] Car Clifford MD    Office Visit    1 month ago Deep vein thrombosis (DVT) of right lower extremity, unspecified chronicity, unspecified vein ([facility])    [facility] Gerardo Mendoza MD    Office Visit    2 months ago Multiple opens wound of lower extremity, unspecified laterality, initial encounter    [facility] Cr Diaz MD    Office Visit    9 months ago COPD, severe ([facility]    [facility] Visit    1 year ago Community acquired pneumonia, unspecified laterality    [facility] Car Cardona MD    Office Visit                      Passed - EGFRCR or GFRNAA > 50     GFR Evaluation  EGFRCR: 61 , resulted on 11/25/2024            METOPROLOL TARTRATE 50 MG Oral Tab [Pharmacy Med Name: METOPROLOL TARTRATE 50MG TABLETS] 180 tablet 1     Sig: TAKE 1 TABLET BY MOUTH TWICE DAILY       Hypertension Medications Protocol Passed - 12/3/2024 11:09 AM        Passed - CMP or BMP in past 12 months        Passed - Last BP reading less than 140/90     BP Readings from Last 1 Encounters:   11/22/24  138/80               Passed - In person appointment or virtual visit in the past 12 mos or appointment in next 3 mos     Recent Outpatient Visits              1 week ago Controlled type 2 diabetes mellitus without complication, without long-term current use of insulin ([facility]    [facility]    1 month ago Deep vein thrombosis (DVT) of right lower extremity, unspecified chronicity, unspecified vein ([facility])    [facility] Gerardo Mendoza MD    Office Visit    2 months ago Multiple opens wound of lower extremity, unspecified laterality, initial encounter    [facility] Cr Diaz MD    Office Visit    9 months ago COPD, severe ([facility])    [facility] Car Cardona MD    Office Visit    1 year ago Community acquired pneumonia, unspecified laterality    [facility]    [facility] Visit                      Passed - EGFRCR or GFRNAA > 50     GFR Evaluation  EGFRCR: 61 , resulted on 11/25/2024               Recent Outpatient Visits              1 week ago Controlled type 2 diabetes mellitus without complication, without long-term current use of insulin ([facility])    [facility] Car Cardona MD    Office Visit    1 month ago Deep vein thrombosis (DVT) of right lower extremity, unspecified chronicity, unspecified vein ([facility])    [facility] Gerardo Mendoza MD    Office Visit    2 months ago Multiple opens wound of lower extremity, unspecified laterality, initial encounter    [facility] Cr Diaz MD    Office Visit    9 months ago COPD, severe ([facility])    [facility] Car Cardona MD    Office Visit    1 year ago Community acquired pneumonia, unspecified  laterality    [facility] Faulk Car Clifford MD    Office Visit

## 2024-12-04 NOTE — TELEPHONE ENCOUNTER
Pharmacy requesting refill      atorvastatin 20 MG Oral Tab, Take 1 tablet (20 mg total) by mouth nightly., Disp: 90 tablet, Rfl: 1

## 2024-12-07 RX ORDER — ATORVASTATIN CALCIUM 20 MG/1
20 TABLET, FILM COATED ORAL NIGHTLY
Qty: 90 TABLET | Refills: 1 | Status: SHIPPED | OUTPATIENT
Start: 2024-12-07

## 2024-12-07 NOTE — TELEPHONE ENCOUNTER
Refill passed per [facility] protocol.     Requested Prescriptions   Pending Prescriptions Disp Refills    atorvastatin 20 MG Oral Tab 90 tablet 1     Sig: Take 1 tablet (20 mg total) by mouth nightly.       Cholesterol Medication Protocol Passed - 12/7/2024 10:12 AM        Passed - ALT < 80     Lab Results   Component Value Date    ALT 13 11/25/2024             Passed - ALT resulted within past year        Passed - Lipid panel within past 12 months     Lab Results   Component Value Date    CHOLEST 142 11/25/2024    TRIG 127 11/25/2024    HDL 35 (L) 11/25/2024    LDL 84 11/25/2024    VLDL 20 11/25/2024    NONHDLC 107 11/25/2024             Passed - In person appointment or virtual visit in the past 12 mos or appointment in next 3 mos     Recent Outpatient Visits              2 weeks ago Controlled type 2 diabetes mellitus without complication, without long-term current use of insulin ([facility])    [facility] [place]    Office Visit    1 month ago Deep vein thrombosis (DVT) of right lower extremity, unspecified chronicity, unspecified vein ([facility])    [facility] Gerardo Mendoza MD    Office Visit    2 months ago Multiple opens wound of lower extremity, unspecified laterality, initial encounter    [facility] Cr Diaz MD    Office Visit    9 months ago COPD, severe ([facility])    [facility] Car Cardona MD    Office Visit    1 year ago Community acquired pneumonia, unspecified laterality    [facility] Car Cardona MD    Office Visit

## 2024-12-11 ENCOUNTER — TELEPHONE (OUTPATIENT)
Dept: INTERNAL MEDICINE CLINIC | Facility: CLINIC | Age: 74
End: 2024-12-11

## 2024-12-11 NOTE — TELEPHONE ENCOUNTER
Patient's long-time friend Vanesa called (not on Release of Information), verified patient's Name and . \[facility]\"" patient was supposed to get an appointment for pulmonary function testing and she was assisting to schedule the appointment, however, was told that patient does not have an order.    \[facility]\"" patient was seen a few weeks ago and this was addressed. Chart reviewed. Relayed that patient should be following up with pulmonologist. Contact information provided.     (J44.9) COPD, severe ([facility])  Plan: Patient was just recently seen in urgent care and treated for acute bronchitis causing COPD exacerbation.  He is already feeling better.  He does have severe COPD and has been repeatedly told before that he should really be following up with pulmonologist and referral was again given.  He states that he continues to use his Trelegy and uses his albuterol inhaler as needed.  He is not needing O2 supplement at this time.     Verbalized understanding and states he will relay this to the patient. No further questions at this time.

## 2024-12-16 ENCOUNTER — NURSE TRIAGE (OUTPATIENT)
Dept: INTERNAL MEDICINE CLINIC | Facility: CLINIC | Age: 74
End: 2024-12-16

## 2024-12-16 ENCOUNTER — TELEPHONE (OUTPATIENT)
Dept: INTERNAL MEDICINE CLINIC | Facility: CLINIC | Age: 74
End: 2024-12-16

## 2024-12-16 NOTE — TELEPHONE ENCOUNTER
Please reply to pool: EM RN TRIAGE  Action Requested: Summary for Provider     []  Critical Lab, Recommendations Needed  [] Need Additional Advice  [x]   FYI    []   Need Orders  [] Need Medications Sent to Pharmacy  []  Other     SUMMARY: Patient contacts clinic reporting constipation.  Last bowel movement yesterday.  Denies abdominal pain, nausea or vomiting.  Colace and miralax were recommended to him.  Nurse advised taking per package instructions if he wishes to use them.  Fiber supplement and miralax discussed.  Report worsening of symptoms, pain, nausea, vomiting or if he is not having a bowel movement every 1-2 days.  He verbalized understanding and compliance.     Reason for call: Constipation  Onset: Data Unavailable

## 2024-12-16 NOTE — TELEPHONE ENCOUNTER
Verified name and .    Patient states that he has not been able to  Trelegy as the pharmacy will not dispense it until 24.    Per Vanesa, significant other on phone with him, she states that they will not dispense it yet because it is not due to be dispensed until that time.     They say no further action is needed at this time.       Disp Refills Start End    fluticasone-umeclidin-vilant (TRELEGY ELLIPTA) 200-62.5-25 MCG/ACT Inhalation Aerosol Powder, Breath Activated 180 each 1 2024 --    Sig - Route: Inhale 1 puff into the lungs daily. - Inhalation    Sent to pharmacy as: Trelegy Ellipta 200-62.5-25 MCG/ACT Aerosol Powder Breath Activated (fluticasone-umeclidin-vilant)    Notes to Pharmacy: **Patient requests 90 days supply**    E-Prescribing Status: Receipt confirmed by pharmacy (2024 12:09 PM CDT)      Pharmacy    [id number] - [place] [address]  [address] [facility] [address], [phone], [phone]

## 2024-12-16 NOTE — TELEPHONE ENCOUNTER
Verified name and  of patient.    Vanesa, significant other of patient- not on release of information, calling to states that patient is constipated- asking for what patient can take for constipation.    She was unable to answer questions about patient's symptoms or last bowel movement.    She was advised to have patient call back to speak with a nurse- verbalized understanding and states that she will have patient call to speak to a nurse today.

## 2024-12-16 NOTE — TELEPHONE ENCOUNTER
Verified name and .    Patient calling with significant other on the phone- states that he will also be trying colace 100 mg over the counter following the 's instructions- also mentions that will be ensuring adequate fluid intake.

## 2024-12-16 NOTE — TELEPHONE ENCOUNTER
Reason for Disposition  • Constipation is a recurrent ongoing problem (i.e., < 3 BMs / week or straining > 25% of the time)    Protocols used: Constipation-A-[place]

## 2025-01-09 RX ORDER — ALBUTEROL SULFATE 90 UG/1
2 INHALANT RESPIRATORY (INHALATION) EVERY 6 HOURS PRN
Qty: 25.5 G | Refills: 0 | Status: SHIPPED | OUTPATIENT
Start: 2025-01-09

## 2025-01-09 NOTE — TELEPHONE ENCOUNTER
Please review; protocol failed/ has no protocol    Requested Prescriptions   Pending Prescriptions Disp Refills    ALBUTEROL 108 (90 Base) MCG/ACT Inhalation Aero Soln [Pharmacy Med Name: ALBUTEROL HFA INH (200 PUFFS) 8.5GM] 25.5 g 0     Sig: INHALE 2 PUFFS INTO THE LUNGS EVERY 6 HOURS AS NEEDED FOR WHEEZING OR SHORTNESS OF BREATH       Asthma & COPD Medication Protocol Failed - 1/9/2025  7:57 AM        Failed - ACT Score greater than or equal to 20                Failed - ACT recorded in the last 12 months                Passed - Appointment in past 6 or next 3 months      Recent Outpatient Visits              1 month ago Controlled type 2 diabetes mellitus without complication, without long-term current use of insulin ([facility])    [facility] Car Cardona MD    Office Visit    2 months ago Deep vein thrombosis (DVT) of right lower extremity, unspecified chronicity, unspecified vein ([facility])    [facility] Hematology Oncology Gerardo Mendoza MD    Office Visit    3 months ago Multiple opens wound of lower extremity, unspecified laterality, initial encounter    [facility] Cr Diaz MD    Office Visit    11 months ago COPD, severe ([facility])    [facility] Car Cardona MD    Office Visit    1 year ago Community acquired pneumonia, unspecified laterality    [facility]    [facility]                      Passed - Medication is active on med list           Recent Outpatient Visits              1 month ago Controlled type 2 diabetes mellitus without complication, without long-term current use of insulin ([facility])    [facility] Car Cardona MD    Office Visit    2 months ago Deep vein thrombosis (DVT) of right lower extremity, unspecified chronicity, unspecified vein ([facility])    [facility]  Hematology Oncology Gerardo Mendoza MD    Office Visit    3 months ago Multiple opens wound of lower extremity, unspecified laterality, initial encounter    [facility] Cr Diaz MD    Office Visit    11 months ago COPD, severe (HCC)    [facility], Car Cardona MD    Office Visit    1 year ago Community acquired pneumonia, unspecified laterality    [facility], Car Cardona MD    Office Visit

## 2025-01-15 ENCOUNTER — TELEPHONE (OUTPATIENT)
Dept: INTERNAL MEDICINE CLINIC | Facility: CLINIC | Age: 75
End: 2025-01-15

## 2025-01-15 DIAGNOSIS — M17.0 BILATERAL PRIMARY OSTEOARTHRITIS OF KNEE: Primary | ICD-10-CM

## 2025-01-15 DIAGNOSIS — Z96.641 S/P HIP REPLACEMENT, RIGHT: ICD-10-CM

## 2025-01-15 DIAGNOSIS — M16.0 BILATERAL HIP JOINT ARTHRITIS: ICD-10-CM

## 2025-01-15 NOTE — TELEPHONE ENCOUNTER
Patient called, verified Name and . Patient requesting to get physical therapy at home so he can gain his strength back, to transition from using a walker currently to using two canes, to walking independently. He cannot recall if he mentioned this to PCP before. Last seen in the office on  for followup.    Dr. Clifford please advise

## 2025-01-17 NOTE — TELEPHONE ENCOUNTER
We can order home PT but will go thru [facility] usually; so we can use residential home health for Home PT

## 2025-02-03 NOTE — TELEPHONE ENCOUNTER
Call to patient giving him information regarding [facility] orders and phone number given.    Triage support please assist with sending over the order to [facility]

## 2025-02-25 ENCOUNTER — TELEPHONE (OUTPATIENT)
Dept: INTERNAL MEDICINE CLINIC | Facility: CLINIC | Age: 75
End: 2025-02-25

## 2025-02-27 RX ORDER — AMLODIPINE BESYLATE 5 MG/1
5 TABLET ORAL DAILY
Qty: 90 TABLET | Refills: 1 | OUTPATIENT
Start: 2025-02-27

## 2025-02-27 NOTE — TELEPHONE ENCOUNTER
Please call patient and verify they have stopped taking amlodipine 5mg.    Prescription is marked as patient not taking from 10/14/24. Office visit notes from 11/22/24 Dr. Clifford: \"vascular surgeon told the patient to stop his amlodipine and be switched over to another blood pressure medicine given his chronic venous insufficiency/hypertension.  I started him on losartan 25 mg daily and will have him follow-up with us in about 4 weeks.\"    Pharmacy filled amlodipine #90 11/30/24, and pharmacy is requesting refill. This refill has been refused.

## 2025-02-28 NOTE — TELEPHONE ENCOUNTER
Patient confirmed that he is not taking the amlodipine 5mg. Was switched to Losartan 25mg daily. Will disregard request from pharmacy.    98

## 2025-03-01 ENCOUNTER — MED REC SCAN ONLY (OUTPATIENT)
Dept: INTERNAL MEDICINE CLINIC | Facility: CLINIC | Age: 75
End: 2025-03-01

## 2025-03-27 RX ORDER — ALBUTEROL SULFATE 90 UG/1
2 INHALANT RESPIRATORY (INHALATION) EVERY 6 HOURS PRN
Qty: 25.5 G | Refills: 1 | Status: SHIPPED | OUTPATIENT
Start: 2025-03-27

## 2025-03-27 NOTE — TELEPHONE ENCOUNTER
Refill failed protocol due to ACT score. Please advise, refill pended.       Requested Prescriptions     Pending Prescriptions Disp Refills    ALBUTEROL 108 (90 Base) MCG/ACT Inhalation Aero Soln [Pharmacy Med Name: ALBUTEROL HFA INH (200 PUFFS) 8.5GM] 25.5 g 0     Sig: INHALE 2 PUFFS INTO THE LUNGS EVERY 6 HOURS AS NEEDED FOR WHEEZING OR SHORTNESS OF BREATH

## 2025-03-31 ENCOUNTER — TELEPHONE (OUTPATIENT)
Dept: NEUROLOGY | Facility: CLINIC | Age: 75
End: 2025-03-31

## 2025-03-31 NOTE — TELEPHONE ENCOUNTER
Phone call returned to pt son, Edwin. Edwin states that the pt is now having issues with his finances and medical decisions. Pt has written checks for $25,000 and up. Patient is concerning the family. Pt is not aware of the issues, and is being taken advantage of.

## 2025-03-31 NOTE — TELEPHONE ENCOUNTER
Pt son called in is requesting a cb from Dr Kearney. Pt has npt appt 4/3/24 with Dr Kearney. Informed it wouldn't be doctor calling but rather than a nurse. Pls advise.

## 2025-05-10 NOTE — TELEPHONE ENCOUNTER
LMTCB, transfer to triage No previous uterine incision/Child[place] Pregnancy/Less than or equal to 4 previous vaginal births/No known bleeding disorder/No history of postpartum hemorrhage

## 2025-05-20 ENCOUNTER — TELEPHONE (OUTPATIENT)
Dept: INTERNAL MEDICINE CLINIC | Facility: CLINIC | Age: 75
End: 2025-05-20

## 2025-05-30 RX ORDER — ATORVASTATIN CALCIUM 20 MG/1
20 TABLET, FILM COATED ORAL NIGHTLY
Qty: 90 TABLET | Refills: 3 | Status: SHIPPED | OUTPATIENT
Start: 2025-05-30

## 2025-05-30 RX ORDER — FAMOTIDINE 20 MG/1
20 TABLET, FILM COATED ORAL 2 TIMES DAILY
Qty: 180 TABLET | Refills: 3 | Status: SHIPPED | OUTPATIENT
Start: 2025-05-30

## 2025-05-30 RX ORDER — FINASTERIDE 5 MG/1
5 TABLET, FILM COATED ORAL DAILY
Qty: 90 TABLET | Refills: 3 | Status: SHIPPED | OUTPATIENT
Start: 2025-05-30

## 2025-06-02 RX ORDER — LOSARTAN POTASSIUM 25 MG/1
25 TABLET ORAL DAILY
Qty: 90 TABLET | Refills: 3 | Status: SHIPPED | OUTPATIENT
Start: 2025-06-02

## 2025-07-01 ENCOUNTER — NURSE TRIAGE (OUTPATIENT)
Dept: INTERNAL MEDICINE CLINIC | Facility: CLINIC | Age: 75
End: 2025-07-01

## 2025-07-01 NOTE — TELEPHONE ENCOUNTER
Dr Clifford =please advise, do you want to order the blood sugar machine as well ?      Vanesa (not release of information ) called and verified patient's name and date of birth .   Patient would like to know if he needs to continue taking metformin 500 mg twice a day. It was prescribed by a home visit LPN since 6/19/2025. Patient does not have any blood sugar machine to check his readings. There is no need to refill; he just wants to ask the PCP if he should continue taking it.    RN updated the medication record.     No future appointments.

## 2025-07-02 NOTE — TELEPHONE ENCOUNTER
Spoke to patient (verified Name and ) and relayed provider's message below. Patient verbalized understanding. Appointment scheduled. States he will call back if he has any further questions or concerns.     Future Appointments   Date Time Provider Department Center   2025  2:30 PM Car Clifford MD [facility]

## 2025-07-08 ENCOUNTER — NURSE TRIAGE (OUTPATIENT)
Dept: INTERNAL MEDICINE CLINIC | Facility: CLINIC | Age: 75
End: 2025-07-08

## 2025-07-08 NOTE — TELEPHONE ENCOUNTER
Please reply to pool: EM RN TRIAGE  Action Requested: Summary for Provider     []  Critical Lab, Recommendations Needed  [x] Need Additional Advice  []   FYI    []   Need Orders  [] Need Medications Sent to Pharmacy  []  Other     SUMMARY: Patient contacts clinic inquiring if there is a pill he can take for swelling in his right leg.  Reports chronic swelling for which he wears a compression stocking.  He has a history of DVT in that leg and takes eliquis.  He states someone came to his house and did an US of his leg. He denies pain or skin changes.  Denies chest pain or shortness of breath.  Nurse expressed concern for new DVT or other cause for swelling.  He continually declined office visit or recommendation for evaluation.  He has an appointment   Future Appointments   Date Time Provider Department Center   8/4/2025  2:30 PM Car Clifford MD [facility] Dr. Linchangco please advise further, patient is declining urgent evaluation at this time.     Reason for call: Leg Swelling  Onset: Data Unavailable                       Reason for Disposition   Thigh, calf, or ankle swelling in only one leg    Protocols used: Leg Swelling and Edema-A-[place]

## 2025-07-11 ENCOUNTER — TELEPHONE (OUTPATIENT)
Dept: INTERNAL MEDICINE CLINIC | Facility: CLINIC | Age: 75
End: 2025-07-11

## 2025-07-11 NOTE — TELEPHONE ENCOUNTER
Patient calling to inquire if he should double up on his furosemide. States he would take 80mg instead of his normal 40mg dosage. RN advised patient to not take additional medication.  Patient would like for provider to confirm that he should not take additional furosemide to assist with swelling. Please advise

## 2025-08-04 ENCOUNTER — OFFICE VISIT (OUTPATIENT)
Dept: INTERNAL MEDICINE CLINIC | Facility: CLINIC | Age: 75
End: 2025-08-04

## 2025-08-04 VITALS
BODY MASS INDEX: 32 KG/M2 | HEART RATE: 75 BPM | DIASTOLIC BLOOD PRESSURE: 80 MMHG | OXYGEN SATURATION: 92 % | WEIGHT: 200 LBS | SYSTOLIC BLOOD PRESSURE: 134 MMHG

## 2025-08-04 DIAGNOSIS — E11.9 CONTROLLED TYPE 2 DIABETES MELLITUS WITHOUT COMPLICATION, WITHOUT LONG-TERM CURRENT USE OF INSULIN (HCC): Primary | ICD-10-CM

## 2025-08-04 DIAGNOSIS — I10 ESSENTIAL HYPERTENSION: ICD-10-CM

## 2025-08-04 DIAGNOSIS — E03.8 SUBCLINICAL HYPOTHYROIDISM: ICD-10-CM

## 2025-08-04 DIAGNOSIS — I87.312 CHRONIC VENOUS HYPERTENSION (IDIOPATHIC) WITH ULCER OF LEFT LOWER EXTREMITY (HCC): ICD-10-CM

## 2025-08-04 DIAGNOSIS — L97.929 CHRONIC VENOUS HYPERTENSION (IDIOPATHIC) WITH ULCER OF LEFT LOWER EXTREMITY (HCC): ICD-10-CM

## 2025-08-04 DIAGNOSIS — E78.00 PURE HYPERCHOLESTEROLEMIA: ICD-10-CM

## 2025-08-04 LAB — HEMOGLOBIN A1C: 5.9 % (ref 4.3–5.6)

## 2025-08-04 PROCEDURE — 99214 OFFICE O/P EST MOD 30 MIN: CPT | Performed by: INTERNAL MEDICINE

## 2025-08-04 PROCEDURE — 83036 HEMOGLOBIN GLYCOSYLATED A1C: CPT | Performed by: INTERNAL MEDICINE

## 2025-08-28 RX ORDER — FUROSEMIDE 40 MG/1
40 TABLET ORAL DAILY
Qty: 90 TABLET | Refills: 3 | OUTPATIENT
Start: 2025-08-28

## (undated) DIAGNOSIS — E11.9 CONTROLLED TYPE 2 DIABETES MELLITUS WITHOUT COMPLICATION, WITHOUT LONG-TERM CURRENT USE OF INSULIN (HCC): ICD-10-CM

## (undated) DIAGNOSIS — I87.2 CHRONIC VENOUS INSUFFICIENCY: Primary | ICD-10-CM

## (undated) DIAGNOSIS — Z00.00 MEDICARE ANNUAL WELLNESS VISIT, SUBSEQUENT: Primary | ICD-10-CM

## (undated) DIAGNOSIS — Z12.5 PROSTATE CANCER SCREENING: ICD-10-CM

## (undated) DIAGNOSIS — E78.00 PURE HYPERCHOLESTEROLEMIA: ICD-10-CM

## (undated) DEVICE — DRILL BIT 4.2/3-FLTD CALIB

## (undated) DEVICE — Device

## (undated) DEVICE — SUTURE VICRYL 2-0 FS-1

## (undated) DEVICE — ENCORE® LATEX MICRO SIZE 6.5, STERILE LATEX POWDER-FREE SURGICAL GLOVE: Brand: ENCORE

## (undated) DEVICE — SUTURE PROLENE 3-0 8687H

## (undated) DEVICE — HIP PINNING: Brand: MEDLINE INDUSTRIES, INC.

## (undated) DEVICE — WEBRIL COTTON UNDERCAST PADDING: Brand: WEBRIL

## (undated) DEVICE — SOL  .9 1000ML BTL

## (undated) DEVICE — SUTURE VICRYL 0 CP-1

## (undated) DEVICE — GAUZE SPONGES,12 PLY: Brand: CURITY

## (undated) DEVICE — GUIDEWIRE SYNT 3.2X400 357.399

## (undated) DEVICE — 3M™ MEDITPORE™ SOFT CLOTH TAPE 6 IN X 10 YD 12 ROLLS/CASE 2966: Brand: 3M™ MEDIPORE™

## (undated) DEVICE — BATTERY

## (undated) DEVICE — 3M™ COBAN™ NL STERILE NON-LATEX SELF-ADHERENT WRAP, 2084S, 4 IN X 5 YD (10 CM X 4,5 M), 18 ROLLS/CASE: Brand: 3M™ COBAN™

## (undated) DEVICE — SPONGE LAP 18X18 XRAY STRL

## (undated) DEVICE — 3M™ TEGADERM™ TRANSPARENT FILM DRESSING, 1626W, 4 IN X 4-3/4 IN (10 CM X 12 CM), 50 EACH/CARTON, 4 CARTON/CASE: Brand: 3M™ TEGADERM™

## (undated) NOTE — ED AVS SNAPSHOT
Elijah Abraham   MRN: [id number]    Department:  [facility] Emergency Department   Date of Visit:  5/19/2018           Disclosure     Insurance plans vary and the physician(s) referred by the ER may not be covered by your plan.  Please contact within the next three months to obtain basic health screening including reassessment of your blood pressure.     IF THERE IS ANY CHANGE OR WORSENING OF YOUR CONDITION, CALL YOUR PRIMARY CARE PHYSICIAN AT ONCE OR RETURN IMMEDIATELY TO THE EMERGENCY DEPARTMEN

## (undated) NOTE — LETTER
11/10/18        Jeannie Lopez  [address] [facility] [address]      Dear Mary Daily,    [facility] records indicate that you have outstanding lab work and or testing that was ordered for you and has not yet been completed:  Orders Placed This Encounter

## (undated) NOTE — ED AVS SNAPSHOT
Abby Ellis   MRN: [id number]    Department:  [facility] Emergency Department   Date of Visit:  8/22/2018           Disclosure     Insurance plans vary and the physician(s) referred by the ER may not be covered by your plan.  Please contact within the next three months to obtain basic health screening including reassessment of your blood pressure.     IF THERE IS ANY CHANGE OR WORSENING OF YOUR CONDITION, CALL YOUR PRIMARY CARE PHYSICIAN AT ONCE OR RETURN IMMEDIATELY TO THE EMERGENCY DEPARTMEN

## (undated) NOTE — LETTER
Patient Name: Chito Buckley  : 1950  MRN: [id number]  Patient Address: [place] [id number]      Coronavirus Disease 2019 (COVID-19)     [place] [address] is committed to the safety and well-being of our patients, members carefully. If your symptoms get worse, call your healthcare provider immediately. 3. Get rest and stay hydrated.    4. If you have a medical appointment, call the healthcare provider ahead of time and tell them that you have or may have COVID-19.  5. For m of fever-reducing medications; and  · Improvement in respiratory symptoms (e.g., cough, shortness of breath); and  · At least 10 days have passed since symptoms first appeared OR if asymptomatic patient or date of symptom onset is unclear then use 10 days donors must:    · Have had a confirmed diagnosis of COVID-19  · Be symptom-free for at least 14 days*    *Some people will be required to have a repeat COVID-19 test in order to be eligible to donate.  If you’re instructed by [facility] that a repeat test is r random. Researchers are trying to identify similarities between people with a Post-COVID condition to better understand if there are risk factors. How do I prevent a Post-COVID condition?   The best way to prevent the long-term symptoms of COVID-19 is

## (undated) NOTE — IP AVS SNAPSHOT
[facility]            (For Outpatient Use Only) Initial Admit Date: 4/13/2019   Inpt/Obs Admit Date: Inpt: 4/13/19 / Obs: N/A   Discharge Date:    Laureen Mclaughlin:  [de-identified]   MRN: [de-identified]   CSN: [id number]   CEID: [id number]        NCA Subscriber ID:  Pt Rel to Subscriber:    Hospital Account Financial Class: Medicare    April 19, 2019

## (undated) NOTE — IP AVS SNAPSHOT
Patient Demographics     Address  [address] Phone  [phone] [facility])  [phone] (Work)  [phone] [facility]) E-mail Address  [email or web address]      Emergency Contact(s)     Name Relation Home Work Mobile    Nelson Crum · diazepam 2 MG Tabs  · docusate sodium 100 MG Caps  · Meclizine HCl 25 MG Tabs         Follow-up Information     Adriano Allen MD In 2 weeks.     Specialty:  NEUROLOGY  Contact information:  [address]  [address] [place] [address] Paolo Dane Take by mouth daily.                 Where to Get Your Medications      Please  your prescriptions at the location directed by your doctor or nurse    Bring a paper prescription for each of these medications  aspirin 325 MG Tabs  atorvastatin 20 MG Result status: Final result   Ordering provider:  Tonja Proctor MD  08/27/18 1048 Resulting lab:  [facility]    Specimen Information    Type Source Collected On   Urine — 08/27/18 1100          Components    Component Value Reference Range Fla Comment:           Estimated GFR units: mL/min/1.73 square meters   eGFR calculated by the CKD-EPI equation.             MAGNESIUM [[id number]] (Normal)  Resulted: 08/27/18 0631, Result status: Final result   Ordering provider:  Michaelle Power MD MEDICAL RECORD #:   [id number]       YOB: 1950  ADMISSION DATE:       08/23/2018    HISTORY AND PHYSICAL EXAMINATION    CHIEF COMPLAINT:  Ataxia and hypertension.     HISTORY OF PRESENT ILLNESS:  The patient is a 64-year-old  male EXTREMITIES:  No peripheral edema, clubbing, or cyanosis. NEUROLOGIC:  Motor and sensory intact. Finger-to-nose maneuver, the patient is unable to pinpoint. Heel-to-shin maneuver is intact. Gait ataxic, but no certain direction of ataxia.   Romberg sign home on metoprolol. He took the medication in the am but felt lightheaded and was unable to walk. His BP was elevated in the ER  at 163/92. EKG in sinus rhythm, and HCT negative. Stroke workup initiated.   MRI confirmed small focus of acute to subacute in PEG 3350 (MIRALAX) powder packet 17 g 17 g Oral Daily PRN   magnesium hydroxide (MILK OF MAGNESIA) 400 MG/5ML suspension 30 mL 30 mL Oral Daily PRN   bisacodyl (DULCOLAX) rectal suppository 10 mg 10 mg Rectal Daily PRN   FLEET ENEMA (FLEET) 7-19 GM/118ML e Exercise                Yes    Comment:walking    Social History Narrative    The patient does not use an assistive device. .      The patient does not live in a home with stairs.                                                FUNCTIONAL STATUS:  Premorbid f Lungs: Non labored on RA, no wheezing appreciated, No accessory muscle noted  Heart: Reg Rate, minimal right sided edema noted in BLE  Abdomen: soft, non-tender, non-distended. No hepatomegaly appreciated. Extrems: no clubbing.  Nails are significantly dis assist to supervision-mod I level to facilitate safe discharge to home.  Acute inpatient rehab will provide an intensive level of daily therapy within an interdisciplinary team approach, with 24H medical oversight and access to subspeciality management, [facility] through 8/27/2018  4:52 PM)      Physical Therapy Note signed by John Resendiz PT at 8/27/2018  3:13 PM  Version 2 of 2    Author:  John Resendiz PT Service:  Rehab Author Type:  Physical Therapist    Filed:  8/27/2018  3:13 PM Date of Service: Increased time spent on education regarding  disease process and healing as pt inquired about the type of stroke and the symptoms. Advised pt to discuss details with physician as well as RN.  Also increased time spent on discussion on the importance of init -   Sitting down on and standing up from a chair with arms (e.g., wheelchair, bedside commode, etc.): A Little   -   Moving from lying on back to sitting on the side of the bed?: A Little   How much help from another person does the patient currently need. Current Status          Attribution Key    Attribution information is not available for this note.              Physical Therapy Note signed by Fabio Penaloza PT at 8/27/2018  3:11 PM  Version 1 of 2    Author:  Fabio Penaloza PT Service:  Rehab Aut required Mod A for ambulation for cueing and gait training, but actual Min A for support/safety. Discussed disease process and healing as pt inquired about the type of stroke and the symptoms. Advised pt to discuss details with physician as well as RN. -   Climbing 3-5 steps with a railing?: A Lot     AM-PAC Score:  Raw Score: 16   PT Approx Degree of Impairment Score: 54.16%   Standardized Score (AM-PAC Scale): 40.78   CMS Modifier (G-Code): CK    FUNCTIONAL ABILITY STATUS  Gait Assessment   Gait Assist :  Albertina Moise PT (Physical Therapist)       PHYSICAL THERAPY TREATMENT NOTE - INPATIENT     Room Number: 520/332-C       Presenting Problem:  (mid brain stroke)    Problem List  Principal Problem:    Dizziness  Active Problems:    Cerebrovascular PT Treatment Plan: Bed mobility;Gait training;Transfer training;Balance training;Stair training    SUBJECTIVE  \"I'm tired, but I'll do what you tell me\"    OBJECTIVE  Precautions: Bed/chair alarm    WEIGHT BEARING RESTRICTION  Weight Bearing Restriction: Current Status  NT this session   Goal #2 Patient is able to demonstrate transfers Sit to/from Stand at assistance level: minimum assistance with walker - rolling      Goal #2  Current Status  min A RW   Goal #3 Patient is able to ambulate 400 feet with as provided Min A for standing balance and cues for hand placement/safety. Pt washed hands, wiped face, and combed hair in standing at sink with Min A; again cued pt to observe posture in the bathroom mirror and self-correct.  Completed ambulation x 75 ft with -   Putting on and taking off regular upper body clothing?: A Lot  -   Taking care of personal grooming such as brushing teeth?: A Little  -   Eating meals?: A Little    AM-PAC Score:  Score: 15  Approx Degree of Impairment: 56.46%  Standardized Score (AM- Dizziness  Active Problems:    Cerebrovascular accident (CVA) due to occlusion of right vertebral artery (HCC)[KH.2]      ASSESSMENT   RN cleared pt for participation in skilled OT session. Completed in collaboration with PT.  Upon arrival, pt ambulating training; Endurance training;Neuromuscluar reeducation; Compensatory technique education[KH. 2]    SUBJECTIVE  This is hard --in regards to combing hair     OBJECTIVE[KH.1]  Precautions: Bed/chair alarm[KH.2]    WEIGHT BEARING RESTRICTION[KH.1]  Weight Bearin Pt will complete LE dressing with SBA    NT                  Goals  on: 18  Frequency: 5x/wk[KH. 1]       Attribution Key     [address]. 1 - Usha Adame OT on 2018  4:19 PM  [address] [place] [address] Drive. 2 - Usha Adame OT on 2018  4:36 PM to improve recall. SLP to continue with implemented objectives to improve executive functions required for ADLs. Diet Recommendations - Solids: Mechanical Soft Chopped   Diet Recommendations - Liquid:  Thin (No Straw;Small Sip )    Compensatory Strategi    In Progress   Goal #5 The patient will participate in memory tasks with 90% accuracy across 3 sessions.   Word List Category Inclusion Tasks 40% accuracy; increased to 80% accuracy provided moderate support      In Progress          FOLLOW UP  Follow Up

## (undated) NOTE — IP AVS SNAPSHOT
[facility]            (For Outpatient Use Only) Initial Admit Date: 8/23/2018   Inpt/Obs Admit Date: Inpt: 8/23/18 / Obs: 08/23/18   Discharge Date:    Jordyn Later:  [de-identified]   MRN: [de-identified]   CSN: [id number]        [id number] Hospital Account Financial Class: Medicare    August 27, 2018

## (undated) NOTE — ED AVS SNAPSHOT
Tyler Aguilar   MRN: [id number]    Department:  [facility] Emergency Department   Date of Visit:  11/12/2018           Disclosure     Insurance plans vary and the physician(s) referred by the ER may not be covered by your plan.  Please contact within the next three months to obtain basic health screening including reassessment of your blood pressure.     IF THERE IS ANY CHANGE OR WORSENING OF YOUR CONDITION, CALL YOUR PRIMARY CARE PHYSICIAN AT ONCE OR RETURN IMMEDIATELY TO THE EMERGENCY DEPARTMEN

## (undated) NOTE — IP AVS SNAPSHOT
Patient Demographics     Address  [place] Phone  [phone] [facility])  [phone] (Work)  [phone] (Mobile) *Preferred* E-mail Address  [email or web address]      Emergency Contact(s)     Name Relation Home Work Mobile Usha Mack MD         atorvastatin 20 MG Tabs  Commonly known as:  LIPITOR  Next dose due:  tonight      TAKE 1 TABLET(20 MG) BY MOUTH EVERY NIGHT   Raghavendra Spence MD         DICLOFENAC OR  Next dose due:  tonight      Take 50 mg by mouth 3 (thr [id number] Alfuzosin HCl ER (UROXATRAL) 24 hr tab 10 mg 04/19/19 0857 Given      [id number] HYDROcodone-acetaminophen (NORCO) 5-325 MG per tab 1 tablet 04/19/19 1130 Given      [id number] HYDROcodone-acetaminophen (NORCO) 5-325 MG per tab 1 tablet (Or Linked Temp  97.5 °F (36.4 °C) Filed at 04/19/2019 0900   SpO2  99 %  (Pended)  Filed at 04/19/2019 1423      Patient's Most Recent Weight       Most Recent Value   Patient Weight  90.7 kg (200 lb)         Lab Results Last 24 Hours      BASIC METABOLIC PANEL (8) Lab - Abbreviation Name Director Address Valid Date Range    [address] [facility] [place] [facility] Tatianna Ascencio. Ana Alamo M.D. [facility]. [address]  [facility] [address] 04/29/14 1047 - Present            Microbiology Results (All)     Procedure Component Value Units Da keys, but he had kept an extra set of keys and so he began driving despite what had been recommended. He states he has had no issues with any moving violations since that time.   He discontinued using the walker after about 2 months on his own, but this wa last year that revealed LVH with an ejection fraction of 55% to 60% with normal wall motion function and no regional wall motion abnormalities. There was also grade 1 diastolic dysfunction, mild aortic regurgitation. No atrial shunt.   The emergency room actually needs as he has poor insight into his deficits. Social work evaluation will be necessary at the time of discharge, and I do not think the patient will be able to go directly home either.   The patient reports his work history as a retired  an was tenderness to palpation in both calves. The right lower extremity was slightly shortened and externally rotated. NEUROLOGIC:  The patient was awake, alert, oriented x3. Gait could not be tested. The patient was able to follow simple commands.   Jaquelin metallic foreign body in the right orbit, MRI cannot be redone. He is such a vague historian, it is so difficult to determine what might have happened.   The possibility of a seizure exists as the patient states he was incontinent, but then tells me he is Consults signed by Sedrick Galvan MD at 4/14/2019  5:51 PM     Author:  Sedrick Galvan MD Service:  Cardiology Author Type:  Physician    Filed:  4/14/2019  5:51 PM Date of Service:  4/14/2019  5:43 PM Status:  Signed    :  Sedrick Galvan MD (Physician) said that his family hid his keys, but he had kept an extra set of keys and so he began driving despite what had been recommended.   He discontinued using the walker after about 2 months on his own, but this was never recommended by Therapy according to fam • BPH (benign prostatic hyperplasia)    • Hyperlipidemia    • Osteoarthritis    • Scoliosis    • Unspecified essential hypertension        Past Surgical History  Past Surgical History:   Procedure Laterality Date   • ELECTROCARDIOGRAM, COMPLETE  11/19/2013 Normal Saline Flush 0.9 % injection 3 mL 3 mL Intravenous PRN   HYDROmorphone HCl (DILAUDID) 1 MG/ML injection 0.3 mg 0.3 mg Intravenous Q4H PRN   HYDROmorphone HCl (DILAUDID) 1 MG/ML injection 0.5 mg 0.5 mg Intravenous Q4H PRN       Medications Prior to A General: Alert and oriented. No apparent distress. No respiratory or constitutional distress. HEENT: Normocephalic, anicteric sclera, neck supple  Neck: No JVD, carotids 2+,  Cardiac: Regular rate and rhythm. No pathologic murmur.   Lungs: Clear with olga Location [facility] 4W/SW/SE Attending Misael Aaron MD   [facility] Day # 6 PCP Wanda Arreaga MD     Date of Admission: 4/13/2019   Date of Discharge: 4/19/2019    Hospital Discharge Diagnoses: hip fracture    Lace+ Score: 70  59-90 High Risk  2 Appreciate ortho recs     Hypoxia  Check cxr - probable consolidation or atelectasis  No fever or wbc count  Continue IS  No antibiotics for now   Wean oxygen to 92 percent   20 IV lasix X1 again  Already received 20 mg IV before   Resolved  Continue on la TAKE 1 TABLET BY MOUTH TWICE DAILY FOR GERD    finasteride 5 MG Oral Tab  Take 1 tablet (5 mg total) by mouth daily. docusate sodium 100 MG Oral Cap  Take 100 mg by mouth 2 (two) times daily as needed for constipation.     DICLOFENAC OR  Take 50 mg by mo Commonly known as:  PEPCID      TAKE 1 TABLET BY MOUTH TWICE DAILY FOR GERD   Quantity:  60 tablet  Refills:  2     finasteride 5 MG Tabs  Commonly known as:  PROSCAR      Take 1 tablet (5 mg total) by mouth daily.    Quantity:  90 tablet  Refills:  0     M Crystal Alvarez  4/19/2019[SH.1]    Electronically signed by Trinh Tidwell MD on 4/19/2019  4:24 PM   Attribution [place]    SH. 1 - Trinh Tidwell MD on 4/19/2019  4:20 PM                        Physical Therapy Notes (last 72 hours) (Notes from 4/16/2019  4: Pt reports being ready for PT RX    OBJECTIVE  Precautions: Bed/chair alarm    WEIGHT BEARING RESTRICTION  Weight Bearing Restriction: R lower extremity        R Lower Extremity: Weight Bearing as Tolerated       PAIN ASSESSMENT   Ratin  Location: R hi session/findings; All patient questions and concerns addressed    CURRENT GOALS     Patient Goal Patient's self-stated goal is: return home   Goal #1 Patient is able to demonstrate supine - sit EOB @ level: modified independent   Goal #1   Current Status Mo Pt seen BID. Pt educated on deep breathing,relaxation and energy conservation technique. Max  A for bed mobility. EOB sitting balance activity with emphasis on core stabilization. Max a x 2 for transfer with RW;cuing for hand placement. Pt am a few side steps b How much help from another person does the patient currently need. .. [CK.1]   -   Moving to and from a bed to a chair (including a wheelchair)?: A Lot   -   Need to walk in hospital room?: A Lot   -   Climbing 3-5 steps with a railing?: Total[CK. 2]     AM-P ROM/strengthening per the instructions provided in preparation for discharge. Goal #6  Current Status ongoing[CK. 1]        Attribution Key    CK. 1 - Gilda Davenport, PTA on 4/18/2019  3:18 PM  CK. 2 - Gilda Davenport, PTA on 4/18/2019  3:19 PM Filed:  4/17/2019 11:41 AM Date of Service:  4/17/2019 10:00 AM Status:  Signed    :  Sherri Freire PT, DPT [facility] (Physical Therapist)       PHYSICAL THERAPY HIP EVALUATION - INPATIENT     Room Number: 760/541-T  Evaluation Date: 4/17/2019  Type of DISCHARGE RECOMMENDATIONS[DF.1]  PT Discharge Recommendations: Sub-acute rehabilitation(PT / OT)[DF.2]    PLAN[DF.1]  PT Treatment Plan: Bed mobility; Body mechanics; Energy conservation;Patient education; Family education;Gait training;Strengthening;Stair tr PHYSICAL THERAPY EXAMINATION     OBJECTIVE[DF.1]  Precautions: Bed/chair alarm(R hip pinning WBAT)  Fall Risk: High fall risk[DF.2]    WEIGHT BEARING RESTRICTION[DF.1]  Weight Bearing Restriction: R lower extremity        R Lower Extremity: Weight Bearing Gait Assessment[DF.1]   Gait Assistance: Maximum assistance  Distance (ft): 4  Assistive Device: Rolling walker  Pattern: R Decreased stance time; Shuffle(antalgic gait decreased step length and floor clearance )  Stoop/Curb Assistance: Not tested  Comment DF.2 - William Chairez, PT, DPT [facility] on 4/17/2019 11:30 AM                        Occupational Therapy Notes (last 72 hours) (Notes from 4/16/2019  4:32 PM through 4/19/2019  4:32 PM)      Occupational Therapy Note signed by Destiny Bradford at 4/19/2019  9:0 Pt remained up in bathroom on commode chair with staff.[BA.3] . DISCHARGE RECOMMENDATIONS[BA.1]  OT Discharge Recommendations: Sub-acute rehabilitation  OT Device Recommendations: 3-in-1 commode[BA. 2]    PLAN[BA. 1]  OT Treatment Plan: ADL training;Funct Dynamic Sitting:[BA.1] good[BA. 4]  Static Standing:[BA.1] fair[BA. 4]  Dynamic Standing:[BA.1] fair[BA. 4]    FUNCTIONAL ADL ASSESSMENT  Grooming:[BA.1] min assist with set up and pt seated[BA. 4]  Bathing:[BA.1] NT[BA. 4]  Toileting:[BA.1] max asssit.  Pt with Pt seen bedside and on bedpan. Pt required cues with sequencing as pt wanted to sit up while still on bedpan. Explained steps repeatedly that pan needed to be removed, with pt c/o increased pain with all movement.  Pt dependent for pan removal, and supine t AM-PAC ‘6-Clicks’ Inpatient Daily Activity Short Form  How much help from another person does the patient currently need…  -   Putting on and taking off regular lower body clothing?: A Lot  -   Bathing (including washing, rinsing, drying)?: A Lot  -   Toil PM  Version 1 of 1    Author:  Bessie Webster OT Service:  Rehab Author Type:  Occupational Therapist    Filed:  4/17/2019  4:59 PM Date of Service:  4/17/2019  9:53 AM Status:  Signed    :  Bessie Webster OT (Occupational Therapist)       Jaimie Evangelista patients with this level of impairment may benefit from VALENTIN. Patient was left in bedside chair with call light in reach and all needs met. Recommended use of overhead lift for transfer.      DISCHARGE RECOMMENDATIONS  OT Discharge Recommendations: Sub-acute Precautions: Bed/chair alarm(R hip pinning WBAT)  Fall Risk: High fall risk    WEIGHT BEARING RESTRICTION  Weight Bearing Restriction: R lower extremity        R Lower Extremity: Weight Bearing as Tolerated       PAIN ASSESSMENT  Rating: Unable to rate Patient will complete LE dressing with AE prn and min a   Comment:     Patient will complete toilet transfer with min a   Comment:     Patient will complete self care task at sink level with min a    Comment:             Goals  on:   Frequency: Interdisciplinary Communication: Discussed with RN            GOALS  Goal #1 The patient will tolerate regular consistency and thin liquids without overt signs or symptoms of aspiration with 100 % accuracy over 2 session(s).     Patient demonstrated no clin

## (undated) NOTE — ED AVS SNAPSHOT
Chirag Lomax   MRN: [id number]    Department:  [facility] Emergency Department   Date of Visit:  9/14/2019           Disclosure     Insurance plans vary and the physician(s) referred by the ER may not be covered by your plan.  Please contact within the next three months to obtain basic health screening including reassessment of your blood pressure.     IF THERE IS ANY CHANGE OR WORSENING OF YOUR CONDITION, CALL YOUR PRIMARY CARE PHYSICIAN AT ONCE OR RETURN IMMEDIATELY TO THE EMERGENCY DEPARTMEN